# Patient Record
Sex: FEMALE | Race: WHITE | Employment: FULL TIME | URBAN - METROPOLITAN AREA
[De-identification: names, ages, dates, MRNs, and addresses within clinical notes are randomized per-mention and may not be internally consistent; named-entity substitution may affect disease eponyms.]

---

## 2017-01-06 ENCOUNTER — HOSPITAL ENCOUNTER (OUTPATIENT)
Dept: INFUSION THERAPY | Age: 37
Discharge: HOME | End: 2017-01-06
Payer: COMMERCIAL

## 2017-01-06 VITALS
BODY MASS INDEX: 26.19 KG/M2 | WEIGHT: 152.6 LBS | DIASTOLIC BLOOD PRESSURE: 76 MMHG | HEART RATE: 109 BPM | RESPIRATION RATE: 18 BRPM | OXYGEN SATURATION: 98 % | TEMPERATURE: 99.6 F | SYSTOLIC BLOOD PRESSURE: 137 MMHG

## 2017-01-06 DIAGNOSIS — C50.312 MALIGNANT NEOPLASM OF LOWER-INNER QUADRANT OF LEFT FEMALE BREAST (HCC): ICD-10-CM

## 2017-01-06 LAB
ALBUMIN SERPL BCP-MCNC: 3.8 G/DL (ref 3.5–5)
ALBUMIN/GLOB SERPL: 1.2 {RATIO} (ref 1.2–3.5)
ALP SERPL-CCNC: 89 U/L (ref 50–136)
ALT SERPL-CCNC: 77 U/L (ref 12–65)
ANION GAP BLD CALC-SCNC: 10 MMOL/L (ref 7–16)
AST SERPL W P-5'-P-CCNC: 28 U/L (ref 15–37)
BASOPHILS # BLD AUTO: 0 K/UL (ref 0–0.2)
BASOPHILS # BLD: 0 % (ref 0–2)
BILIRUB SERPL-MCNC: 0.9 MG/DL (ref 0.2–1.1)
BUN SERPL-MCNC: 10 MG/DL (ref 6–23)
CALCIUM SERPL-MCNC: 9.4 MG/DL (ref 8.3–10.4)
CHLORIDE SERPL-SCNC: 105 MMOL/L (ref 98–107)
CO2 SERPL-SCNC: 24 MMOL/L (ref 23–32)
CREAT SERPL-MCNC: 0.81 MG/DL (ref 0.6–1)
DIFFERENTIAL METHOD BLD: ABNORMAL
EOSINOPHIL # BLD: 0 K/UL (ref 0–0.8)
EOSINOPHIL NFR BLD: 0 % (ref 0.5–7.8)
ERYTHROCYTE [DISTWIDTH] IN BLOOD BY AUTOMATED COUNT: 17.7 % (ref 11.9–14.6)
GLOBULIN SER CALC-MCNC: 3.3 G/DL (ref 2.3–3.5)
GLUCOSE SERPL-MCNC: 145 MG/DL (ref 65–100)
HCT VFR BLD AUTO: 32.6 % (ref 35.8–46.3)
HGB BLD-MCNC: 10.9 G/DL (ref 11.7–15.4)
LYMPHOCYTES # BLD AUTO: 5 % (ref 13–44)
LYMPHOCYTES # BLD: 0.3 K/UL (ref 0.5–4.6)
MCH RBC QN AUTO: 31.4 PG (ref 26.1–32.9)
MCHC RBC AUTO-ENTMCNC: 33.4 G/DL (ref 31.4–35)
MCV RBC AUTO: 93.9 FL (ref 79.6–97.8)
MONOCYTES # BLD: 0.1 K/UL (ref 0.1–1.3)
MONOCYTES NFR BLD AUTO: 2 % (ref 4–12)
NEUTS SEG # BLD: 4.5 K/UL (ref 1.7–8.2)
NEUTS SEG NFR BLD AUTO: 92 % (ref 43–78)
NRBC # BLD: 0 K/UL (ref 0–0.2)
PLATELET # BLD AUTO: 171 K/UL (ref 150–450)
PMV BLD AUTO: 10.1 FL (ref 10.8–14.1)
POTASSIUM SERPL-SCNC: 3.6 MMOL/L (ref 3.5–5.1)
PROT SERPL-MCNC: 7.1 G/DL (ref 6.3–8.2)
RBC # BLD AUTO: 3.47 M/UL (ref 4.05–5.25)
SODIUM SERPL-SCNC: 139 MMOL/L (ref 136–145)
WBC # BLD AUTO: 4.8 K/UL (ref 4.3–11.1)

## 2017-01-06 PROCEDURE — 74011000250 HC RX REV CODE- 250: Performed by: NURSE PRACTITIONER

## 2017-01-06 PROCEDURE — 74011250636 HC RX REV CODE- 250/636: Performed by: NURSE PRACTITIONER

## 2017-01-06 PROCEDURE — 96413 CHEMO IV INFUSION 1 HR: CPT

## 2017-01-06 PROCEDURE — 74011250636 HC RX REV CODE- 250/636: Performed by: INTERNAL MEDICINE

## 2017-01-06 PROCEDURE — 77030003560 HC NDL HUBR BARD -A

## 2017-01-06 PROCEDURE — 85025 COMPLETE CBC W/AUTO DIFF WBC: CPT | Performed by: INTERNAL MEDICINE

## 2017-01-06 PROCEDURE — 96375 TX/PRO/DX INJ NEW DRUG ADDON: CPT

## 2017-01-06 PROCEDURE — 80053 COMPREHEN METABOLIC PANEL: CPT | Performed by: INTERNAL MEDICINE

## 2017-01-06 RX ORDER — HEPARIN 100 UNIT/ML
500 SYRINGE INTRAVENOUS AS NEEDED
Status: COMPLETED | OUTPATIENT
Start: 2017-01-06 | End: 2017-01-06

## 2017-01-06 RX ORDER — SODIUM CHLORIDE 0.9 % (FLUSH) 0.9 %
10-40 SYRINGE (ML) INJECTION AS NEEDED
Status: ACTIVE | OUTPATIENT
Start: 2017-01-06 | End: 2017-01-07

## 2017-01-06 RX ORDER — DEXAMETHASONE SODIUM PHOSPHATE 100 MG/10ML
8 INJECTION INTRAMUSCULAR; INTRAVENOUS ONCE
Status: COMPLETED | OUTPATIENT
Start: 2017-01-06 | End: 2017-01-06

## 2017-01-06 RX ORDER — FAMOTIDINE 10 MG/ML
20 INJECTION INTRAVENOUS ONCE
Status: COMPLETED | OUTPATIENT
Start: 2017-01-06 | End: 2017-01-06

## 2017-01-06 RX ORDER — DIPHENHYDRAMINE HYDROCHLORIDE 50 MG/ML
25 INJECTION, SOLUTION INTRAMUSCULAR; INTRAVENOUS ONCE
Status: COMPLETED | OUTPATIENT
Start: 2017-01-06 | End: 2017-01-06

## 2017-01-06 RX ORDER — HEPARIN 100 UNIT/ML
300-500 SYRINGE INTRAVENOUS AS NEEDED
Status: ACTIVE | OUTPATIENT
Start: 2017-01-06 | End: 2017-01-07

## 2017-01-06 RX ORDER — SODIUM CHLORIDE 0.9 % (FLUSH) 0.9 %
10 SYRINGE (ML) INJECTION AS NEEDED
Status: ACTIVE | OUTPATIENT
Start: 2017-01-06 | End: 2017-01-07

## 2017-01-06 RX ADMIN — SODIUM CHLORIDE, PRESERVATIVE FREE 500 UNITS: 5 INJECTION INTRAVENOUS at 16:45

## 2017-01-06 RX ADMIN — DEXAMETHASONE SODIUM PHOSPHATE 8 MG: 10 INJECTION INTRAMUSCULAR; INTRAVENOUS at 14:58

## 2017-01-06 RX ADMIN — PACLITAXEL 137 MG: 6 INJECTION, SOLUTION, CONCENTRATE INTRAVENOUS at 15:37

## 2017-01-06 RX ADMIN — SODIUM CHLORIDE 500 ML: 900 INJECTION, SOLUTION INTRAVENOUS at 14:35

## 2017-01-06 RX ADMIN — Medication 10 ML: at 14:35

## 2017-01-06 RX ADMIN — DIPHENHYDRAMINE HYDROCHLORIDE 12.5 MG: 50 INJECTION, SOLUTION INTRAMUSCULAR; INTRAVENOUS at 14:55

## 2017-01-06 RX ADMIN — FAMOTIDINE 20 MG: 10 INJECTION, SOLUTION INTRAVENOUS at 15:03

## 2017-01-06 RX ADMIN — Medication 10 ML: at 16:45

## 2017-01-06 NOTE — PROGRESS NOTES
Arrived to the [facility]. Upon arrival port accessed and blood drawn. Chemo completed. Patient tolerated well. Port flushed and de-accessed. Any issues or concerns during appointment: None. Patient aware of next infusion appointment on 1/13 (date) at 1400 (time). Discharged ambulatory in stable condition.

## 2017-01-06 NOTE — PROGRESS NOTES
Problem: Knowledge Deficit  Goal: *Participate in the learning process  Outcome: Progressing Towards Goal  Review plan of care  Review patient education  Monitor for reactions

## 2017-01-13 ENCOUNTER — HOSPITAL ENCOUNTER (OUTPATIENT)
Dept: LAB | Age: 37
Discharge: HOME | End: 2017-01-13
Payer: COMMERCIAL

## 2017-01-13 ENCOUNTER — HOSPITAL ENCOUNTER (OUTPATIENT)
Dept: INFUSION THERAPY | Age: 37
Discharge: HOME | End: 2017-01-13
Payer: COMMERCIAL

## 2017-01-13 DIAGNOSIS — C50.312 MALIGNANT NEOPLASM OF LOWER-INNER QUADRANT OF LEFT FEMALE BREAST (HCC): ICD-10-CM

## 2017-01-13 LAB
ALBUMIN SERPL BCP-MCNC: 3.3 G/DL (ref 3.5–5)
ALBUMIN/GLOB SERPL: 0.9 {RATIO} (ref 1.2–3.5)
ALP SERPL-CCNC: 78 U/L (ref 50–136)
ALT SERPL-CCNC: 35 U/L (ref 12–65)
ANION GAP BLD CALC-SCNC: 7 MMOL/L (ref 7–16)
AST SERPL W P-5'-P-CCNC: 12 U/L (ref 15–37)
BASOPHILS # BLD AUTO: 0 K/UL (ref 0–0.2)
BASOPHILS # BLD: 0 % (ref 0–2)
BILIRUB SERPL-MCNC: 0.9 MG/DL (ref 0.2–1.1)
BUN SERPL-MCNC: 6 MG/DL (ref 6–23)
CALCIUM SERPL-MCNC: 8.6 MG/DL (ref 8.3–10.4)
CHLORIDE SERPL-SCNC: 108 MMOL/L (ref 98–107)
CO2 SERPL-SCNC: 25 MMOL/L (ref 23–32)
CREAT SERPL-MCNC: 0.66 MG/DL (ref 0.6–1)
DIFFERENTIAL METHOD BLD: ABNORMAL
EOSINOPHIL # BLD: 0 K/UL (ref 0–0.8)
EOSINOPHIL NFR BLD: 0 % (ref 0.5–7.8)
ERYTHROCYTE [DISTWIDTH] IN BLOOD BY AUTOMATED COUNT: 17 % (ref 11.9–14.6)
GLOBULIN SER CALC-MCNC: 3.5 G/DL (ref 2.3–3.5)
GLUCOSE SERPL-MCNC: 159 MG/DL (ref 65–100)
HCG SERPL QL: NEGATIVE
HCT VFR BLD AUTO: 28.9 % (ref 35.8–46.3)
HGB BLD-MCNC: 9.7 G/DL (ref 11.7–15.4)
LYMPHOCYTES # BLD AUTO: 7 % (ref 13–44)
LYMPHOCYTES # BLD: 0.3 K/UL (ref 0.5–4.6)
MCH RBC QN AUTO: 31.6 PG (ref 26.1–32.9)
MCHC RBC AUTO-ENTMCNC: 33.6 G/DL (ref 31.4–35)
MCV RBC AUTO: 94.1 FL (ref 79.6–97.8)
MONOCYTES # BLD: 0.1 K/UL (ref 0.1–1.3)
MONOCYTES NFR BLD AUTO: 3 % (ref 4–12)
NEUTS SEG # BLD: 3.6 K/UL (ref 1.7–8.2)
NEUTS SEG NFR BLD AUTO: 90 % (ref 43–78)
NRBC # BLD: 0 K/UL (ref 0–0.2)
PLATELET # BLD AUTO: 137 K/UL (ref 150–450)
PLATELET COMMENTS,PCOM: SLIGHT
PMV BLD AUTO: 9.9 FL (ref 10.8–14.1)
POTASSIUM SERPL-SCNC: 3.7 MMOL/L (ref 3.5–5.1)
PROT SERPL-MCNC: 6.8 G/DL (ref 6.3–8.2)
RBC # BLD AUTO: 3.07 M/UL (ref 4.05–5.25)
RBC MORPH BLD: ABNORMAL
SODIUM SERPL-SCNC: 140 MMOL/L (ref 136–145)
WBC # BLD AUTO: 4 K/UL (ref 4.3–11.1)
WBC MORPH BLD: ABNORMAL

## 2017-01-13 PROCEDURE — 96413 CHEMO IV INFUSION 1 HR: CPT

## 2017-01-13 PROCEDURE — 74011000250 HC RX REV CODE- 250: Performed by: NURSE PRACTITIONER

## 2017-01-13 PROCEDURE — 85025 COMPLETE CBC W/AUTO DIFF WBC: CPT | Performed by: NURSE PRACTITIONER

## 2017-01-13 PROCEDURE — 80053 COMPREHEN METABOLIC PANEL: CPT | Performed by: NURSE PRACTITIONER

## 2017-01-13 PROCEDURE — 84703 CHORIONIC GONADOTROPIN ASSAY: CPT | Performed by: INTERNAL MEDICINE

## 2017-01-13 PROCEDURE — 96375 TX/PRO/DX INJ NEW DRUG ADDON: CPT

## 2017-01-13 PROCEDURE — 74011250636 HC RX REV CODE- 250/636: Performed by: NURSE PRACTITIONER

## 2017-01-13 RX ORDER — HEPARIN 100 UNIT/ML
300-500 SYRINGE INTRAVENOUS AS NEEDED
Status: DISPENSED | OUTPATIENT
Start: 2017-01-13 | End: 2017-01-14

## 2017-01-13 RX ORDER — DEXAMETHASONE SODIUM PHOSPHATE 4 MG/ML
8 INJECTION, SOLUTION INTRA-ARTICULAR; INTRALESIONAL; INTRAMUSCULAR; INTRAVENOUS; SOFT TISSUE ONCE
Status: COMPLETED | OUTPATIENT
Start: 2017-01-13 | End: 2017-01-13

## 2017-01-13 RX ORDER — DIPHENHYDRAMINE HYDROCHLORIDE 50 MG/ML
12.5 INJECTION, SOLUTION INTRAMUSCULAR; INTRAVENOUS ONCE
Status: COMPLETED | OUTPATIENT
Start: 2017-01-13 | End: 2017-01-13

## 2017-01-13 RX ORDER — SODIUM CHLORIDE 0.9 % (FLUSH) 0.9 %
10 SYRINGE (ML) INJECTION AS NEEDED
Status: ACTIVE | OUTPATIENT
Start: 2017-01-13 | End: 2017-01-14

## 2017-01-13 RX ORDER — FAMOTIDINE 10 MG/ML
20 INJECTION INTRAVENOUS ONCE
Status: COMPLETED | OUTPATIENT
Start: 2017-01-13 | End: 2017-01-13

## 2017-01-13 RX ADMIN — DEXAMETHASONE SODIUM PHOSPHATE 8 MG: 4 INJECTION, SOLUTION INTRAMUSCULAR; INTRAVENOUS at 14:54

## 2017-01-13 RX ADMIN — FAMOTIDINE 20 MG: 10 INJECTION, SOLUTION INTRAVENOUS at 15:01

## 2017-01-13 RX ADMIN — SODIUM CHLORIDE, PRESERVATIVE FREE 500 UNITS: 5 INJECTION INTRAVENOUS at 16:44

## 2017-01-13 RX ADMIN — SODIUM CHLORIDE 500 ML: 900 INJECTION, SOLUTION INTRAVENOUS at 14:27

## 2017-01-13 RX ADMIN — PACLITAXEL 137 MG: 6 INJECTION, SOLUTION, CONCENTRATE INTRAVENOUS at 15:38

## 2017-01-13 RX ADMIN — Medication 10 ML: at 16:44

## 2017-01-13 RX ADMIN — DIPHENHYDRAMINE HYDROCHLORIDE 12.5 MG: 50 INJECTION, SOLUTION INTRAMUSCULAR; INTRAVENOUS at 14:57

## 2017-01-13 RX ADMIN — Medication 10 ML: at 14:27

## 2017-01-13 NOTE — PROGRESS NOTES
Arrived to the [facility]. Taxol completed. Patient tolerated well. Any issues or concerns during appointment: Spoke with Dr. Katarina Mcdonough regarding if patient needs pregnancy test before treatment. Per MD, patient should have pregnancy test today. Test resulted negative. Patient aware of next infusion appointment on 1/20/17 at 1400. Discharged ambulatory from Infusion.

## 2017-01-19 RX ORDER — ACETAMINOPHEN 325 MG/1
650 TABLET ORAL AS NEEDED
Status: CANCELLED
Start: 2017-01-20

## 2017-01-19 RX ORDER — EPINEPHRINE 1 MG/ML
0.3 INJECTION, SOLUTION, CONCENTRATE INTRAVENOUS AS NEEDED
Status: CANCELLED | OUTPATIENT
Start: 2017-01-20

## 2017-01-19 RX ORDER — DIPHENHYDRAMINE HYDROCHLORIDE 50 MG/ML
50 INJECTION, SOLUTION INTRAMUSCULAR; INTRAVENOUS AS NEEDED
Status: CANCELLED
Start: 2017-01-20

## 2017-01-19 RX ORDER — ALBUTEROL SULFATE 0.83 MG/ML
2.5 SOLUTION RESPIRATORY (INHALATION) AS NEEDED
Status: CANCELLED
Start: 2017-01-20

## 2017-01-19 RX ORDER — HYDROCORTISONE SODIUM SUCCINATE 100 MG/2ML
100 INJECTION, POWDER, FOR SOLUTION INTRAMUSCULAR; INTRAVENOUS AS NEEDED
Status: CANCELLED | OUTPATIENT
Start: 2017-01-20

## 2017-01-19 RX ORDER — ONDANSETRON 2 MG/ML
8 INJECTION INTRAMUSCULAR; INTRAVENOUS AS NEEDED
Status: CANCELLED | OUTPATIENT
Start: 2017-01-20

## 2017-01-19 RX ORDER — HEPARIN SODIUM 1000 [USP'U]/ML
2000 INJECTION, SOLUTION INTRAVENOUS; SUBCUTANEOUS AS NEEDED
Status: CANCELLED
Start: 2017-01-20

## 2017-01-20 ENCOUNTER — HOSPITAL ENCOUNTER (OUTPATIENT)
Dept: INFUSION THERAPY | Age: 37
Discharge: HOME | End: 2017-01-20
Payer: COMMERCIAL

## 2017-01-20 VITALS
WEIGHT: 150.8 LBS | HEART RATE: 103 BPM | SYSTOLIC BLOOD PRESSURE: 114 MMHG | BODY MASS INDEX: 25.88 KG/M2 | OXYGEN SATURATION: 100 % | DIASTOLIC BLOOD PRESSURE: 70 MMHG | RESPIRATION RATE: 18 BRPM | TEMPERATURE: 98.6 F

## 2017-01-20 DIAGNOSIS — C50.312 MALIGNANT NEOPLASM OF LOWER-INNER QUADRANT OF LEFT FEMALE BREAST (HCC): ICD-10-CM

## 2017-01-20 LAB
ALBUMIN SERPL BCP-MCNC: 3.1 G/DL (ref 3.5–5)
ALBUMIN/GLOB SERPL: 0.8 {RATIO} (ref 1.2–3.5)
ALP SERPL-CCNC: 78 U/L (ref 50–136)
ALT SERPL-CCNC: 26 U/L (ref 12–65)
ANION GAP BLD CALC-SCNC: 6 MMOL/L (ref 7–16)
AST SERPL W P-5'-P-CCNC: 13 U/L (ref 15–37)
BASOPHILS # BLD AUTO: 0 K/UL (ref 0–0.2)
BASOPHILS # BLD: 0 % (ref 0–2)
BILIRUB SERPL-MCNC: 0.7 MG/DL (ref 0.2–1.1)
BUN SERPL-MCNC: 8 MG/DL (ref 6–23)
CALCIUM SERPL-MCNC: 9.2 MG/DL (ref 8.3–10.4)
CHLORIDE SERPL-SCNC: 103 MMOL/L (ref 98–107)
CO2 SERPL-SCNC: 28 MMOL/L (ref 23–32)
CREAT SERPL-MCNC: 0.62 MG/DL (ref 0.6–1)
DIFFERENTIAL METHOD BLD: ABNORMAL
EOSINOPHIL # BLD: 0 K/UL (ref 0–0.8)
EOSINOPHIL NFR BLD: 1 % (ref 0.5–7.8)
ERYTHROCYTE [DISTWIDTH] IN BLOOD BY AUTOMATED COUNT: 14.7 % (ref 11.9–14.6)
GLOBULIN SER CALC-MCNC: 3.9 G/DL (ref 2.3–3.5)
GLUCOSE SERPL-MCNC: 159 MG/DL (ref 65–100)
HCT VFR BLD AUTO: 28.4 % (ref 35.8–46.3)
HGB BLD-MCNC: 9.4 G/DL (ref 11.7–15.4)
LYMPHOCYTES # BLD AUTO: 8 % (ref 13–44)
LYMPHOCYTES # BLD: 0.3 K/UL (ref 0.5–4.6)
MCH RBC QN AUTO: 31.1 PG (ref 26.1–32.9)
MCHC RBC AUTO-ENTMCNC: 33.1 G/DL (ref 31.4–35)
MCV RBC AUTO: 94 FL (ref 79.6–97.8)
MONOCYTES # BLD: 0.1 K/UL (ref 0.1–1.3)
MONOCYTES NFR BLD AUTO: 2 % (ref 4–12)
NEUTS SEG # BLD: 3.4 K/UL (ref 1.7–8.2)
NEUTS SEG NFR BLD AUTO: 88 % (ref 43–78)
NRBC # BLD: 0 K/UL (ref 0–0.2)
PLATELET # BLD AUTO: 255 K/UL (ref 150–450)
PMV BLD AUTO: 9.5 FL (ref 10.8–14.1)
POTASSIUM SERPL-SCNC: 3.6 MMOL/L (ref 3.5–5.1)
PROT SERPL-MCNC: 7 G/DL (ref 6.3–8.2)
RBC # BLD AUTO: 3.02 M/UL (ref 4.05–5.25)
SODIUM SERPL-SCNC: 137 MMOL/L (ref 136–145)
WBC # BLD AUTO: 3.8 K/UL (ref 4.3–11.1)

## 2017-01-20 PROCEDURE — 85025 COMPLETE CBC W/AUTO DIFF WBC: CPT | Performed by: INTERNAL MEDICINE

## 2017-01-20 PROCEDURE — 96413 CHEMO IV INFUSION 1 HR: CPT

## 2017-01-20 PROCEDURE — 96375 TX/PRO/DX INJ NEW DRUG ADDON: CPT

## 2017-01-20 PROCEDURE — 74011000250 HC RX REV CODE- 250: Performed by: INTERNAL MEDICINE

## 2017-01-20 PROCEDURE — 74011250636 HC RX REV CODE- 250/636: Performed by: INTERNAL MEDICINE

## 2017-01-20 PROCEDURE — 77030003560 HC NDL HUBR BARD -A

## 2017-01-20 PROCEDURE — 80053 COMPREHEN METABOLIC PANEL: CPT | Performed by: INTERNAL MEDICINE

## 2017-01-20 RX ORDER — DEXAMETHASONE SODIUM PHOSPHATE 4 MG/ML
8 INJECTION, SOLUTION INTRA-ARTICULAR; INTRALESIONAL; INTRAMUSCULAR; INTRAVENOUS; SOFT TISSUE ONCE
Status: COMPLETED | OUTPATIENT
Start: 2017-01-20 | End: 2017-01-20

## 2017-01-20 RX ORDER — FAMOTIDINE 10 MG/ML
20 INJECTION INTRAVENOUS ONCE
Status: COMPLETED | OUTPATIENT
Start: 2017-01-20 | End: 2017-01-20

## 2017-01-20 RX ORDER — HEPARIN 100 UNIT/ML
300-500 SYRINGE INTRAVENOUS AS NEEDED
Status: DISPENSED | OUTPATIENT
Start: 2017-01-20 | End: 2017-01-21

## 2017-01-20 RX ORDER — SODIUM CHLORIDE 0.9 % (FLUSH) 0.9 %
10 SYRINGE (ML) INJECTION AS NEEDED
Status: ACTIVE | OUTPATIENT
Start: 2017-01-20 | End: 2017-01-21

## 2017-01-20 RX ORDER — DIPHENHYDRAMINE HYDROCHLORIDE 50 MG/ML
12.5 INJECTION, SOLUTION INTRAMUSCULAR; INTRAVENOUS ONCE
Status: COMPLETED | OUTPATIENT
Start: 2017-01-20 | End: 2017-01-20

## 2017-01-20 RX ADMIN — SODIUM CHLORIDE, PRESERVATIVE FREE 500 UNITS: 5 INJECTION INTRAVENOUS at 15:57

## 2017-01-20 RX ADMIN — PACLITAXEL 137 MG: 6 INJECTION, SOLUTION, CONCENTRATE INTRAVENOUS at 14:52

## 2017-01-20 RX ADMIN — SODIUM CHLORIDE 500 ML: 900 INJECTION, SOLUTION INTRAVENOUS at 14:36

## 2017-01-20 RX ADMIN — DIPHENHYDRAMINE HYDROCHLORIDE 12.5 MG: 50 INJECTION, SOLUTION INTRAMUSCULAR; INTRAVENOUS at 14:32

## 2017-01-20 RX ADMIN — FAMOTIDINE 20 MG: 10 INJECTION, SOLUTION INTRAVENOUS at 14:34

## 2017-01-20 RX ADMIN — Medication 10 ML: at 15:57

## 2017-01-20 RX ADMIN — DEXAMETHASONE SODIUM PHOSPHATE 8 MG: 4 INJECTION, SOLUTION INTRAMUSCULAR; INTRAVENOUS at 14:30

## 2017-01-20 NOTE — PROGRESS NOTES
Chemo completed per order, pt tolerated well, discharged home ambulatory, next appointment 1/27 at [phone]

## 2017-01-27 ENCOUNTER — HOSPITAL ENCOUNTER (OUTPATIENT)
Dept: LAB | Age: 37
Discharge: HOME | End: 2017-01-27
Payer: COMMERCIAL

## 2017-01-27 ENCOUNTER — PATIENT OUTREACH (OUTPATIENT)
Dept: CASE MANAGEMENT | Age: 37
End: 2017-01-27

## 2017-01-27 ENCOUNTER — HOSPITAL ENCOUNTER (OUTPATIENT)
Dept: INFUSION THERAPY | Age: 37
Discharge: HOME | End: 2017-01-27
Payer: COMMERCIAL

## 2017-01-27 VITALS
TEMPERATURE: 98.6 F | BODY MASS INDEX: 26.06 KG/M2 | DIASTOLIC BLOOD PRESSURE: 78 MMHG | OXYGEN SATURATION: 97 % | RESPIRATION RATE: 16 BRPM | HEART RATE: 84 BPM | WEIGHT: 151.8 LBS | SYSTOLIC BLOOD PRESSURE: 113 MMHG

## 2017-01-27 DIAGNOSIS — C50.312 MALIGNANT NEOPLASM OF LOWER-INNER QUADRANT OF LEFT FEMALE BREAST (HCC): ICD-10-CM

## 2017-01-27 LAB
ALBUMIN SERPL BCP-MCNC: 3.1 G/DL (ref 3.5–5)
ALBUMIN/GLOB SERPL: 0.8 {RATIO} (ref 1.2–3.5)
ALP SERPL-CCNC: 93 U/L (ref 50–136)
ALT SERPL-CCNC: 28 U/L (ref 12–65)
ANION GAP BLD CALC-SCNC: 6 MMOL/L (ref 7–16)
AST SERPL W P-5'-P-CCNC: 13 U/L (ref 15–37)
BASOPHILS # BLD AUTO: 0 K/UL (ref 0–0.2)
BASOPHILS # BLD: 0 % (ref 0–2)
BILIRUB SERPL-MCNC: 0.4 MG/DL (ref 0.2–1.1)
BUN SERPL-MCNC: 8 MG/DL (ref 6–23)
CALCIUM SERPL-MCNC: 8.9 MG/DL (ref 8.3–10.4)
CHLORIDE SERPL-SCNC: 105 MMOL/L (ref 98–107)
CO2 SERPL-SCNC: 26 MMOL/L (ref 23–32)
CREAT SERPL-MCNC: 0.76 MG/DL (ref 0.6–1)
DIFFERENTIAL METHOD BLD: ABNORMAL
EOSINOPHIL # BLD: 0 K/UL (ref 0–0.8)
EOSINOPHIL NFR BLD: 1 % (ref 0.5–7.8)
ERYTHROCYTE [DISTWIDTH] IN BLOOD BY AUTOMATED COUNT: 14.6 % (ref 11.9–14.6)
GLOBULIN SER CALC-MCNC: 3.8 G/DL (ref 2.3–3.5)
GLUCOSE SERPL-MCNC: 136 MG/DL (ref 65–100)
HCT VFR BLD AUTO: 30.6 % (ref 35.8–46.3)
HGB BLD-MCNC: 10.1 G/DL (ref 11.7–15.4)
LYMPHOCYTES # BLD AUTO: 8 % (ref 13–44)
LYMPHOCYTES # BLD: 0.4 K/UL (ref 0.5–4.6)
MCH RBC QN AUTO: 31.8 PG (ref 26.1–32.9)
MCHC RBC AUTO-ENTMCNC: 33 G/DL (ref 31.4–35)
MCV RBC AUTO: 96.2 FL (ref 79.6–97.8)
MONOCYTES # BLD: 0.2 K/UL (ref 0.1–1.3)
MONOCYTES NFR BLD AUTO: 3 % (ref 4–12)
NEUTS SEG # BLD: 5.2 K/UL (ref 1.7–8.2)
NEUTS SEG NFR BLD AUTO: 89 % (ref 43–78)
NRBC # BLD: 0 K/UL (ref 0–0.2)
PLATELET # BLD AUTO: 283 K/UL (ref 150–450)
PMV BLD AUTO: 9.5 FL (ref 10.8–14.1)
POTASSIUM SERPL-SCNC: 3.8 MMOL/L (ref 3.5–5.1)
PROT SERPL-MCNC: 6.9 G/DL (ref 6.3–8.2)
RBC # BLD AUTO: 3.18 M/UL (ref 4.05–5.25)
SODIUM SERPL-SCNC: 137 MMOL/L (ref 136–145)
WBC # BLD AUTO: 5.8 K/UL (ref 4.3–11.1)

## 2017-01-27 PROCEDURE — 85025 COMPLETE CBC W/AUTO DIFF WBC: CPT | Performed by: NURSE PRACTITIONER

## 2017-01-27 PROCEDURE — 96413 CHEMO IV INFUSION 1 HR: CPT

## 2017-01-27 PROCEDURE — 96375 TX/PRO/DX INJ NEW DRUG ADDON: CPT

## 2017-01-27 PROCEDURE — 80053 COMPREHEN METABOLIC PANEL: CPT | Performed by: NURSE PRACTITIONER

## 2017-01-27 PROCEDURE — 74011250636 HC RX REV CODE- 250/636: Performed by: NURSE PRACTITIONER

## 2017-01-27 PROCEDURE — 96361 HYDRATE IV INFUSION ADD-ON: CPT

## 2017-01-27 PROCEDURE — 74011000250 HC RX REV CODE- 250: Performed by: NURSE PRACTITIONER

## 2017-01-27 RX ORDER — SODIUM CHLORIDE 0.9 % (FLUSH) 0.9 %
10 SYRINGE (ML) INJECTION AS NEEDED
Status: ACTIVE | OUTPATIENT
Start: 2017-01-27 | End: 2017-01-27

## 2017-01-27 RX ORDER — HEPARIN 100 UNIT/ML
300-500 SYRINGE INTRAVENOUS AS NEEDED
Status: DISPENSED | OUTPATIENT
Start: 2017-01-27 | End: 2017-01-27

## 2017-01-27 RX ORDER — DIPHENHYDRAMINE HYDROCHLORIDE 50 MG/ML
12.5 INJECTION, SOLUTION INTRAMUSCULAR; INTRAVENOUS ONCE
Status: COMPLETED | OUTPATIENT
Start: 2017-01-27 | End: 2017-01-27

## 2017-01-27 RX ORDER — DEXAMETHASONE SODIUM PHOSPHATE 100 MG/10ML
8 INJECTION INTRAMUSCULAR; INTRAVENOUS ONCE
Status: COMPLETED | OUTPATIENT
Start: 2017-01-27 | End: 2017-01-27

## 2017-01-27 RX ORDER — FAMOTIDINE 10 MG/ML
20 INJECTION INTRAVENOUS ONCE
Status: COMPLETED | OUTPATIENT
Start: 2017-01-27 | End: 2017-01-27

## 2017-01-27 RX ADMIN — FAMOTIDINE 20 MG: 10 INJECTION, SOLUTION INTRAVENOUS at 11:16

## 2017-01-27 RX ADMIN — PACLITAXEL 137 MG: 6 INJECTION, SOLUTION, CONCENTRATE INTRAVENOUS at 11:45

## 2017-01-27 RX ADMIN — SODIUM CHLORIDE, PRESERVATIVE FREE 500 UNITS: 5 INJECTION INTRAVENOUS at 13:00

## 2017-01-27 RX ADMIN — Medication 10 ML: at 11:18

## 2017-01-27 RX ADMIN — Medication 10 ML: at 10:45

## 2017-01-27 RX ADMIN — DEXAMETHASONE SODIUM PHOSPHATE 8 MG: 10 INJECTION INTRAMUSCULAR; INTRAVENOUS at 11:18

## 2017-01-27 RX ADMIN — DIPHENHYDRAMINE HYDROCHLORIDE 12.5 MG: 50 INJECTION, SOLUTION INTRAMUSCULAR; INTRAVENOUS at 11:20

## 2017-01-27 RX ADMIN — Medication 10 ML: at 11:16

## 2017-01-27 RX ADMIN — Medication 10 ML: at 13:00

## 2017-01-27 RX ADMIN — SODIUM CHLORIDE 500 ML: 900 INJECTION, SOLUTION INTRAVENOUS at 10:45

## 2017-01-27 NOTE — PROGRESS NOTES
Problem: Chemotherapy Treatment  Goal: *Chemotherapy regimen followed  Outcome: Progressing Towards Goal  Verbalizes/demonstrates understanding of purpose/procedure/potential side effects of taxol.

## 2017-01-27 NOTE — PROGRESS NOTES
Pt arrived ambulatory today at 1040, to receive IV chemotherapy. Pt tolerated without difficulty. Patient discharged via ambulatory accompanied by mother. Instructed to notify physician of any problems, questions or concerns. Allowed opportunity for patient/family to ask questions. Verbalized understanding. Next appointment is Feb 3 at 1400 with Moses Galo.

## 2017-01-27 NOTE — ACP (ADVANCE CARE PLANNING)
1/27/17 saw pt today with Aflredo Sterling NP for pre chemo cycle 7 taxol, 1 dose missed. She is tolerating chemo well. Neuropathy is intermittent and mild. PO intake is good. Continue current treatment. Follow up in 2 weeks. Encouraged to call with any concerns. Navigation will continue to follow.

## 2017-02-03 ENCOUNTER — HOSPITAL ENCOUNTER (OUTPATIENT)
Dept: INFUSION THERAPY | Age: 37
Discharge: HOME | End: 2017-02-03
Payer: COMMERCIAL

## 2017-02-03 VITALS
SYSTOLIC BLOOD PRESSURE: 116 MMHG | HEART RATE: 92 BPM | RESPIRATION RATE: 18 BRPM | TEMPERATURE: 98.4 F | BODY MASS INDEX: 26.4 KG/M2 | DIASTOLIC BLOOD PRESSURE: 60 MMHG | OXYGEN SATURATION: 97 % | WEIGHT: 153.8 LBS

## 2017-02-03 DIAGNOSIS — C50.312 MALIGNANT NEOPLASM OF LOWER-INNER QUADRANT OF LEFT FEMALE BREAST (HCC): ICD-10-CM

## 2017-02-03 LAB
ALBUMIN SERPL BCP-MCNC: 3.2 G/DL (ref 3.5–5)
ALBUMIN/GLOB SERPL: 0.8 {RATIO} (ref 1.2–3.5)
ALP SERPL-CCNC: 97 U/L (ref 50–136)
ALT SERPL-CCNC: 33 U/L (ref 12–65)
ANION GAP BLD CALC-SCNC: 11 MMOL/L (ref 7–16)
AST SERPL W P-5'-P-CCNC: 17 U/L (ref 15–37)
BASOPHILS # BLD AUTO: 0 K/UL (ref 0–0.2)
BASOPHILS # BLD: 0 % (ref 0–2)
BILIRUB SERPL-MCNC: 0.5 MG/DL (ref 0.2–1.1)
BUN SERPL-MCNC: 10 MG/DL (ref 6–23)
CALCIUM SERPL-MCNC: 8.9 MG/DL (ref 8.3–10.4)
CHLORIDE SERPL-SCNC: 103 MMOL/L (ref 98–107)
CO2 SERPL-SCNC: 25 MMOL/L (ref 23–32)
CREAT SERPL-MCNC: 0.71 MG/DL (ref 0.6–1)
DIFFERENTIAL METHOD BLD: ABNORMAL
EOSINOPHIL # BLD: 0 K/UL (ref 0–0.8)
EOSINOPHIL NFR BLD: 1 % (ref 0.5–7.8)
ERYTHROCYTE [DISTWIDTH] IN BLOOD BY AUTOMATED COUNT: 14.6 % (ref 11.9–14.6)
GLOBULIN SER CALC-MCNC: 4 G/DL (ref 2.3–3.5)
GLUCOSE SERPL-MCNC: 158 MG/DL (ref 65–100)
HCT VFR BLD AUTO: 30.4 % (ref 35.8–46.3)
HGB BLD-MCNC: 10 G/DL (ref 11.7–15.4)
LYMPHOCYTES # BLD AUTO: 7 % (ref 13–44)
LYMPHOCYTES # BLD: 0.4 K/UL (ref 0.5–4.6)
MCH RBC QN AUTO: 31.6 PG (ref 26.1–32.9)
MCHC RBC AUTO-ENTMCNC: 32.9 G/DL (ref 31.4–35)
MCV RBC AUTO: 96.2 FL (ref 79.6–97.8)
MONOCYTES # BLD: 0.1 K/UL (ref 0.1–1.3)
MONOCYTES NFR BLD AUTO: 3 % (ref 4–12)
NEUTS SEG # BLD: 4.8 K/UL (ref 1.7–8.2)
NEUTS SEG NFR BLD AUTO: 90 % (ref 43–78)
NRBC # BLD: 0.01 K/UL (ref 0–0.2)
PLATELET # BLD AUTO: 250 K/UL (ref 150–450)
PMV BLD AUTO: 9.2 FL (ref 10.8–14.1)
POTASSIUM SERPL-SCNC: 4 MMOL/L (ref 3.5–5.1)
PROT SERPL-MCNC: 7.2 G/DL (ref 6.3–8.2)
RBC # BLD AUTO: 3.16 M/UL (ref 4.05–5.25)
SODIUM SERPL-SCNC: 139 MMOL/L (ref 136–145)
WBC # BLD AUTO: 5.3 K/UL (ref 4.3–11.1)

## 2017-02-03 PROCEDURE — 96375 TX/PRO/DX INJ NEW DRUG ADDON: CPT

## 2017-02-03 PROCEDURE — 77030003560 HC NDL HUBR BARD -A

## 2017-02-03 PROCEDURE — 80053 COMPREHEN METABOLIC PANEL: CPT | Performed by: INTERNAL MEDICINE

## 2017-02-03 PROCEDURE — 74011250636 HC RX REV CODE- 250/636: Performed by: NURSE PRACTITIONER

## 2017-02-03 PROCEDURE — 85025 COMPLETE CBC W/AUTO DIFF WBC: CPT | Performed by: INTERNAL MEDICINE

## 2017-02-03 PROCEDURE — 74011000250 HC RX REV CODE- 250: Performed by: NURSE PRACTITIONER

## 2017-02-03 PROCEDURE — 96413 CHEMO IV INFUSION 1 HR: CPT

## 2017-02-03 PROCEDURE — 96361 HYDRATE IV INFUSION ADD-ON: CPT

## 2017-02-03 RX ORDER — HEPARIN 100 UNIT/ML
300-500 SYRINGE INTRAVENOUS AS NEEDED
Status: DISPENSED | OUTPATIENT
Start: 2017-02-03 | End: 2017-02-04

## 2017-02-03 RX ORDER — DEXAMETHASONE SODIUM PHOSPHATE 4 MG/ML
8 INJECTION, SOLUTION INTRA-ARTICULAR; INTRALESIONAL; INTRAMUSCULAR; INTRAVENOUS; SOFT TISSUE ONCE
Status: COMPLETED | OUTPATIENT
Start: 2017-02-03 | End: 2017-02-03

## 2017-02-03 RX ORDER — DIPHENHYDRAMINE HYDROCHLORIDE 50 MG/ML
25 INJECTION, SOLUTION INTRAMUSCULAR; INTRAVENOUS ONCE
Status: COMPLETED | OUTPATIENT
Start: 2017-02-03 | End: 2017-02-03

## 2017-02-03 RX ORDER — FAMOTIDINE 10 MG/ML
20 INJECTION INTRAVENOUS ONCE
Status: COMPLETED | OUTPATIENT
Start: 2017-02-03 | End: 2017-02-03

## 2017-02-03 RX ADMIN — DIPHENHYDRAMINE HYDROCHLORIDE 25 MG: 50 INJECTION, SOLUTION INTRAMUSCULAR; INTRAVENOUS at 14:21

## 2017-02-03 RX ADMIN — SODIUM CHLORIDE 500 ML: 900 INJECTION, SOLUTION INTRAVENOUS at 13:45

## 2017-02-03 RX ADMIN — FAMOTIDINE 20 MG: 10 INJECTION, SOLUTION INTRAVENOUS at 14:17

## 2017-02-03 RX ADMIN — SODIUM CHLORIDE, PRESERVATIVE FREE 500 UNITS: 5 INJECTION INTRAVENOUS at 15:58

## 2017-02-03 RX ADMIN — PACLITAXEL 137 MG: 6 INJECTION, SOLUTION, CONCENTRATE INTRAVENOUS at 14:53

## 2017-02-03 RX ADMIN — DEXAMETHASONE SODIUM PHOSPHATE 8 MG: 4 INJECTION, SOLUTION INTRAMUSCULAR; INTRAVENOUS at 14:19

## 2017-02-03 NOTE — PROGRESS NOTES
Arrived to the [facility]. chemo completed. Patient tolerated well. Any issues or concerns during appointment: no.  Patient aware of next infusion appointment on  2/10 at 1500. Discharged to home ambulatory.

## 2017-02-03 NOTE — PROGRESS NOTES
Arrived to the [facility]. Port already accessed - positive blood return. Hydration, premeds and chemo completed. Patient tolerated without difficulty. Any issues or concerns during appointment: none. Patient aware of next infusion appointment on 2/10 (date) at 3pm (time). Discharged ambulatory to home.

## 2017-02-10 ENCOUNTER — HOSPITAL ENCOUNTER (OUTPATIENT)
Dept: INFUSION THERAPY | Age: 37
Discharge: HOME | End: 2017-02-10
Payer: COMMERCIAL

## 2017-02-10 ENCOUNTER — HOSPITAL ENCOUNTER (OUTPATIENT)
Dept: LAB | Age: 37
Discharge: HOME | End: 2017-02-10
Payer: COMMERCIAL

## 2017-02-10 ENCOUNTER — PATIENT OUTREACH (OUTPATIENT)
Dept: CASE MANAGEMENT | Age: 37
End: 2017-02-10

## 2017-02-10 DIAGNOSIS — C50.312 MALIGNANT NEOPLASM OF LOWER-INNER QUADRANT OF LEFT FEMALE BREAST (HCC): ICD-10-CM

## 2017-02-10 LAB
ALBUMIN SERPL BCP-MCNC: 3.4 G/DL (ref 3.5–5)
ALBUMIN/GLOB SERPL: 1.1 {RATIO} (ref 1.2–3.5)
ALP SERPL-CCNC: 80 U/L (ref 50–136)
ALT SERPL-CCNC: 34 U/L (ref 12–65)
ANION GAP BLD CALC-SCNC: 7 MMOL/L (ref 7–16)
AST SERPL W P-5'-P-CCNC: 20 U/L (ref 15–37)
BASOPHILS # BLD AUTO: 0 K/UL (ref 0–0.2)
BASOPHILS # BLD: 1 % (ref 0–2)
BILIRUB SERPL-MCNC: 0.4 MG/DL (ref 0.2–1.1)
BUN SERPL-MCNC: 7 MG/DL (ref 6–23)
CALCIUM SERPL-MCNC: 8.9 MG/DL (ref 8.3–10.4)
CHLORIDE SERPL-SCNC: 106 MMOL/L (ref 98–107)
CO2 SERPL-SCNC: 27 MMOL/L (ref 23–32)
CREAT SERPL-MCNC: 0.67 MG/DL (ref 0.6–1)
DIFFERENTIAL METHOD BLD: ABNORMAL
EOSINOPHIL # BLD: 0.1 K/UL (ref 0–0.8)
EOSINOPHIL NFR BLD: 2 % (ref 0.5–7.8)
ERYTHROCYTE [DISTWIDTH] IN BLOOD BY AUTOMATED COUNT: 14.6 % (ref 11.9–14.6)
GLOBULIN SER CALC-MCNC: 3.2 G/DL (ref 2.3–3.5)
GLUCOSE SERPL-MCNC: 134 MG/DL (ref 65–100)
HCT VFR BLD AUTO: 30 % (ref 35.8–46.3)
HGB BLD-MCNC: 9.8 G/DL (ref 11.7–15.4)
LYMPHOCYTES # BLD AUTO: 28 % (ref 13–44)
LYMPHOCYTES # BLD: 1.2 K/UL (ref 0.5–4.6)
MCH RBC QN AUTO: 31.3 PG (ref 26.1–32.9)
MCHC RBC AUTO-ENTMCNC: 32.7 G/DL (ref 31.4–35)
MCV RBC AUTO: 95.8 FL (ref 79.6–97.8)
MONOCYTES # BLD: 0.3 K/UL (ref 0.1–1.3)
MONOCYTES NFR BLD AUTO: 6 % (ref 4–12)
NEUTS SEG # BLD: 2.7 K/UL (ref 1.7–8.2)
NEUTS SEG NFR BLD AUTO: 64 % (ref 43–78)
NRBC # BLD: 0 K/UL (ref 0–0.2)
PLATELET # BLD AUTO: 278 K/UL (ref 150–450)
PMV BLD AUTO: 9.2 FL (ref 10.8–14.1)
POTASSIUM SERPL-SCNC: 3.7 MMOL/L (ref 3.5–5.1)
PROT SERPL-MCNC: 6.6 G/DL (ref 6.3–8.2)
RBC # BLD AUTO: 3.13 M/UL (ref 4.05–5.25)
SODIUM SERPL-SCNC: 140 MMOL/L (ref 136–145)
WBC # BLD AUTO: 4.2 K/UL (ref 4.3–11.1)

## 2017-02-10 PROCEDURE — 96413 CHEMO IV INFUSION 1 HR: CPT

## 2017-02-10 PROCEDURE — 85025 COMPLETE CBC W/AUTO DIFF WBC: CPT | Performed by: INTERNAL MEDICINE

## 2017-02-10 PROCEDURE — 80053 COMPREHEN METABOLIC PANEL: CPT | Performed by: INTERNAL MEDICINE

## 2017-02-10 PROCEDURE — 74011250636 HC RX REV CODE- 250/636: Performed by: NURSE PRACTITIONER

## 2017-02-10 PROCEDURE — 74011000250 HC RX REV CODE- 250: Performed by: NURSE PRACTITIONER

## 2017-02-10 PROCEDURE — 96375 TX/PRO/DX INJ NEW DRUG ADDON: CPT

## 2017-02-10 RX ORDER — DEXAMETHASONE SODIUM PHOSPHATE 100 MG/10ML
8 INJECTION INTRAMUSCULAR; INTRAVENOUS ONCE
Status: COMPLETED | OUTPATIENT
Start: 2017-02-10 | End: 2017-02-10

## 2017-02-10 RX ORDER — HYDROCORTISONE SODIUM SUCCINATE 100 MG/2ML
100 INJECTION, POWDER, FOR SOLUTION INTRAMUSCULAR; INTRAVENOUS AS NEEDED
Status: ACTIVE | OUTPATIENT
Start: 2017-02-10 | End: 2017-02-11

## 2017-02-10 RX ORDER — DIPHENHYDRAMINE HYDROCHLORIDE 50 MG/ML
25 INJECTION, SOLUTION INTRAMUSCULAR; INTRAVENOUS ONCE
Status: COMPLETED | OUTPATIENT
Start: 2017-02-10 | End: 2017-02-10

## 2017-02-10 RX ORDER — SODIUM CHLORIDE 0.9 % (FLUSH) 0.9 %
10 SYRINGE (ML) INJECTION AS NEEDED
Status: ACTIVE | OUTPATIENT
Start: 2017-02-10 | End: 2017-02-11

## 2017-02-10 RX ORDER — HEPARIN 100 UNIT/ML
300-500 SYRINGE INTRAVENOUS AS NEEDED
Status: DISPENSED | OUTPATIENT
Start: 2017-02-10 | End: 2017-02-11

## 2017-02-10 RX ORDER — FAMOTIDINE 10 MG/ML
20 INJECTION INTRAVENOUS ONCE
Status: COMPLETED | OUTPATIENT
Start: 2017-02-10 | End: 2017-02-10

## 2017-02-10 RX ADMIN — PACLITAXEL 137 MG: 6 INJECTION, SOLUTION, CONCENTRATE INTRAVENOUS at 16:30

## 2017-02-10 RX ADMIN — SODIUM CHLORIDE 500 ML: 900 INJECTION, SOLUTION INTRAVENOUS at 15:31

## 2017-02-10 RX ADMIN — DEXAMETHASONE SODIUM PHOSPHATE 8 MG: 10 INJECTION INTRAMUSCULAR; INTRAVENOUS at 15:38

## 2017-02-10 RX ADMIN — SODIUM CHLORIDE, PRESERVATIVE FREE 500 UNITS: 5 INJECTION INTRAVENOUS at 17:36

## 2017-02-10 RX ADMIN — FAMOTIDINE 20 MG: 10 INJECTION, SOLUTION INTRAVENOUS at 15:41

## 2017-02-10 RX ADMIN — DIPHENHYDRAMINE HYDROCHLORIDE 25 MG: 50 INJECTION, SOLUTION INTRAMUSCULAR; INTRAVENOUS at 15:50

## 2017-02-10 RX ADMIN — Medication 10 ML: at 17:36

## 2017-02-10 RX ADMIN — Medication 10 ML: at 15:31

## 2017-02-10 NOTE — PROGRESS NOTES
Arrived to the [facility]. Taxol completed. Patient tolerated well. Any issues or concerns during appointment: none. Patient aware of next infusion appointment on 02/17  at 1330 . Discharged ambulatory.

## 2017-02-10 NOTE — ACP (ADVANCE CARE PLANNING)
2/10/2017 Saw the patient with Layton Meneses NP. She is doing well and due for her 9th Taxol today. She denies neuropathy. Will continue to follow.

## 2017-02-17 ENCOUNTER — HOSPITAL ENCOUNTER (OUTPATIENT)
Dept: INFUSION THERAPY | Age: 37
Discharge: HOME | End: 2017-02-17
Payer: COMMERCIAL

## 2017-02-17 ENCOUNTER — HOSPITAL ENCOUNTER (OUTPATIENT)
Dept: LAB | Age: 37
Discharge: HOME | End: 2017-02-17
Payer: COMMERCIAL

## 2017-02-17 ENCOUNTER — PATIENT OUTREACH (OUTPATIENT)
Dept: CASE MANAGEMENT | Age: 37
End: 2017-02-17

## 2017-02-17 DIAGNOSIS — C50.312 MALIGNANT NEOPLASM OF LOWER-INNER QUADRANT OF LEFT FEMALE BREAST (HCC): ICD-10-CM

## 2017-02-17 LAB
ALBUMIN SERPL BCP-MCNC: 3.4 G/DL (ref 3.5–5)
ALBUMIN/GLOB SERPL: 1 {RATIO} (ref 1.2–3.5)
ALP SERPL-CCNC: 79 U/L (ref 50–136)
ALT SERPL-CCNC: 41 U/L (ref 12–65)
ANION GAP BLD CALC-SCNC: 7 MMOL/L (ref 7–16)
AST SERPL W P-5'-P-CCNC: 19 U/L (ref 15–37)
BASOPHILS # BLD AUTO: 0 K/UL (ref 0–0.2)
BASOPHILS # BLD: 0 % (ref 0–2)
BILIRUB SERPL-MCNC: 0.5 MG/DL (ref 0.2–1.1)
BUN SERPL-MCNC: 12 MG/DL (ref 6–23)
CALCIUM SERPL-MCNC: 8.9 MG/DL (ref 8.3–10.4)
CHLORIDE SERPL-SCNC: 106 MMOL/L (ref 98–107)
CO2 SERPL-SCNC: 27 MMOL/L (ref 23–32)
CREAT SERPL-MCNC: 0.71 MG/DL (ref 0.6–1)
DIFFERENTIAL METHOD BLD: ABNORMAL
EOSINOPHIL # BLD: 0.1 K/UL (ref 0–0.8)
EOSINOPHIL NFR BLD: 2 % (ref 0.5–7.8)
ERYTHROCYTE [DISTWIDTH] IN BLOOD BY AUTOMATED COUNT: 15.3 % (ref 11.9–14.6)
GLOBULIN SER CALC-MCNC: 3.3 G/DL (ref 2.3–3.5)
GLUCOSE SERPL-MCNC: 102 MG/DL (ref 65–100)
HCT VFR BLD AUTO: 31.9 % (ref 35.8–46.3)
HGB BLD-MCNC: 10.4 G/DL (ref 11.7–15.4)
LYMPHOCYTES # BLD AUTO: 22 % (ref 13–44)
LYMPHOCYTES # BLD: 1 K/UL (ref 0.5–4.6)
MCH RBC QN AUTO: 31.5 PG (ref 26.1–32.9)
MCHC RBC AUTO-ENTMCNC: 32.6 G/DL (ref 31.4–35)
MCV RBC AUTO: 96.7 FL (ref 79.6–97.8)
MONOCYTES # BLD: 0.4 K/UL (ref 0.1–1.3)
MONOCYTES NFR BLD AUTO: 8 % (ref 4–12)
NEUTS SEG # BLD: 3 K/UL (ref 1.7–8.2)
NEUTS SEG NFR BLD AUTO: 68 % (ref 43–78)
NRBC # BLD: 0.01 K/UL (ref 0–0.2)
PLATELET # BLD AUTO: 222 K/UL (ref 150–450)
PMV BLD AUTO: 9.4 FL (ref 10.8–14.1)
POTASSIUM SERPL-SCNC: 3.8 MMOL/L (ref 3.5–5.1)
PROT SERPL-MCNC: 6.7 G/DL (ref 6.3–8.2)
RBC # BLD AUTO: 3.3 M/UL (ref 4.05–5.25)
SODIUM SERPL-SCNC: 140 MMOL/L (ref 136–145)
WBC # BLD AUTO: 4.5 K/UL (ref 4.3–11.1)

## 2017-02-17 PROCEDURE — 74011000250 HC RX REV CODE- 250: Performed by: NURSE PRACTITIONER

## 2017-02-17 PROCEDURE — 74011250636 HC RX REV CODE- 250/636: Performed by: NURSE PRACTITIONER

## 2017-02-17 PROCEDURE — 96413 CHEMO IV INFUSION 1 HR: CPT

## 2017-02-17 PROCEDURE — 85025 COMPLETE CBC W/AUTO DIFF WBC: CPT | Performed by: NURSE PRACTITIONER

## 2017-02-17 PROCEDURE — 80053 COMPREHEN METABOLIC PANEL: CPT | Performed by: NURSE PRACTITIONER

## 2017-02-17 PROCEDURE — 96375 TX/PRO/DX INJ NEW DRUG ADDON: CPT

## 2017-02-17 RX ORDER — HEPARIN 100 UNIT/ML
300-500 SYRINGE INTRAVENOUS AS NEEDED
Status: DISPENSED | OUTPATIENT
Start: 2017-02-17 | End: 2017-02-18

## 2017-02-17 RX ORDER — FAMOTIDINE 10 MG/ML
20 INJECTION INTRAVENOUS ONCE
Status: COMPLETED | OUTPATIENT
Start: 2017-02-17 | End: 2017-02-17

## 2017-02-17 RX ORDER — DIPHENHYDRAMINE HYDROCHLORIDE 50 MG/ML
12.5 INJECTION, SOLUTION INTRAMUSCULAR; INTRAVENOUS ONCE
Status: COMPLETED | OUTPATIENT
Start: 2017-02-17 | End: 2017-02-17

## 2017-02-17 RX ORDER — HYDROCORTISONE SODIUM SUCCINATE 100 MG/2ML
100 INJECTION, POWDER, FOR SOLUTION INTRAMUSCULAR; INTRAVENOUS AS NEEDED
Status: ACTIVE | OUTPATIENT
Start: 2017-02-17 | End: 2017-02-18

## 2017-02-17 RX ORDER — DEXAMETHASONE SODIUM PHOSPHATE 100 MG/10ML
8 INJECTION INTRAMUSCULAR; INTRAVENOUS ONCE
Status: COMPLETED | OUTPATIENT
Start: 2017-02-17 | End: 2017-02-17

## 2017-02-17 RX ORDER — SODIUM CHLORIDE 0.9 % (FLUSH) 0.9 %
10 SYRINGE (ML) INJECTION AS NEEDED
Status: ACTIVE | OUTPATIENT
Start: 2017-02-17 | End: 2017-02-18

## 2017-02-17 RX ADMIN — Medication 10 ML: at 16:38

## 2017-02-17 RX ADMIN — DEXAMETHASONE SODIUM PHOSPHATE 8 MG: 10 INJECTION INTRAMUSCULAR; INTRAVENOUS at 14:43

## 2017-02-17 RX ADMIN — PACLITAXEL 137 MG: 6 INJECTION, SOLUTION, CONCENTRATE INTRAVENOUS at 15:20

## 2017-02-17 RX ADMIN — Medication 10 ML: at 14:38

## 2017-02-17 RX ADMIN — FAMOTIDINE 20 MG: 10 INJECTION, SOLUTION INTRAVENOUS at 14:46

## 2017-02-17 RX ADMIN — SODIUM CHLORIDE, PRESERVATIVE FREE 500 UNITS: 5 INJECTION INTRAVENOUS at 16:38

## 2017-02-17 RX ADMIN — SODIUM CHLORIDE 500 ML: 900 INJECTION, SOLUTION INTRAVENOUS at 14:53

## 2017-02-17 RX ADMIN — DIPHENHYDRAMINE HYDROCHLORIDE 12.5 MG: 50 INJECTION, SOLUTION INTRAMUSCULAR; INTRAVENOUS at 14:50

## 2017-02-17 NOTE — ACP (ADVANCE CARE PLANNING)
2/17/17 saw pt today with Carmencita Espinoza NP for pre chemo cycle 10 taxol. She is tolerating chemo well. Denies any neuropathy. PO intake good. Follow up in 2 weeks for last chemo. Encouraged to call with any concerns. Navigation will continue to follow.

## 2017-02-17 NOTE — PROGRESS NOTES
Pt. Discharged ambulatory accompanied by family. Tolerated chemotherapy well. No distress noted. To call physician with any problems or concerns. Understanding voiced. To return to infusions on 2/24/17.

## 2017-02-24 ENCOUNTER — HOSPITAL ENCOUNTER (OUTPATIENT)
Dept: INFUSION THERAPY | Age: 37
Discharge: HOME | End: 2017-02-24
Payer: COMMERCIAL

## 2017-02-24 VITALS
BODY MASS INDEX: 27.05 KG/M2 | SYSTOLIC BLOOD PRESSURE: 110 MMHG | HEART RATE: 88 BPM | DIASTOLIC BLOOD PRESSURE: 70 MMHG | WEIGHT: 157.6 LBS | TEMPERATURE: 98.1 F | RESPIRATION RATE: 18 BRPM | OXYGEN SATURATION: 96 %

## 2017-02-24 DIAGNOSIS — C50.312 MALIGNANT NEOPLASM OF LOWER-INNER QUADRANT OF LEFT FEMALE BREAST (HCC): ICD-10-CM

## 2017-02-24 LAB
ALBUMIN SERPL BCP-MCNC: 3.3 G/DL (ref 3.5–5)
ALBUMIN/GLOB SERPL: 1.1 {RATIO} (ref 1.2–3.5)
ALP SERPL-CCNC: 86 U/L (ref 50–136)
ALT SERPL-CCNC: 32 U/L (ref 12–65)
ANION GAP BLD CALC-SCNC: 8 MMOL/L (ref 7–16)
AST SERPL W P-5'-P-CCNC: 17 U/L (ref 15–37)
BASOPHILS # BLD AUTO: 0 K/UL (ref 0–0.2)
BASOPHILS # BLD: 0 % (ref 0–2)
BILIRUB SERPL-MCNC: 0.8 MG/DL (ref 0.2–1.1)
BUN SERPL-MCNC: 8 MG/DL (ref 6–23)
CALCIUM SERPL-MCNC: 8.7 MG/DL (ref 8.3–10.4)
CHLORIDE SERPL-SCNC: 106 MMOL/L (ref 98–107)
CO2 SERPL-SCNC: 27 MMOL/L (ref 23–32)
CREAT SERPL-MCNC: 0.71 MG/DL (ref 0.6–1)
DIFFERENTIAL METHOD BLD: ABNORMAL
EOSINOPHIL # BLD: 0.1 K/UL (ref 0–0.8)
EOSINOPHIL NFR BLD: 1 % (ref 0.5–7.8)
ERYTHROCYTE [DISTWIDTH] IN BLOOD BY AUTOMATED COUNT: 15.2 % (ref 11.9–14.6)
GLOBULIN SER CALC-MCNC: 3.1 G/DL (ref 2.3–3.5)
GLUCOSE SERPL-MCNC: 139 MG/DL (ref 65–100)
HCT VFR BLD AUTO: 32.5 % (ref 35.8–46.3)
HGB BLD-MCNC: 10.7 G/DL (ref 11.7–15.4)
LYMPHOCYTES # BLD AUTO: 12 % (ref 13–44)
LYMPHOCYTES # BLD: 0.7 K/UL (ref 0.5–4.6)
MCH RBC QN AUTO: 31.8 PG (ref 26.1–32.9)
MCHC RBC AUTO-ENTMCNC: 32.9 G/DL (ref 31.4–35)
MCV RBC AUTO: 96.4 FL (ref 79.6–97.8)
MONOCYTES # BLD: 0.3 K/UL (ref 0.1–1.3)
MONOCYTES NFR BLD AUTO: 5 % (ref 4–12)
NEUTS SEG # BLD: 4.5 K/UL (ref 1.7–8.2)
NEUTS SEG NFR BLD AUTO: 82 % (ref 43–78)
NRBC # BLD: 0 K/UL (ref 0–0.2)
PLATELET # BLD AUTO: 230 K/UL (ref 150–450)
PMV BLD AUTO: 9.6 FL (ref 10.8–14.1)
POTASSIUM SERPL-SCNC: 3.7 MMOL/L (ref 3.5–5.1)
PROT SERPL-MCNC: 6.4 G/DL (ref 6.3–8.2)
RBC # BLD AUTO: 3.37 M/UL (ref 4.05–5.25)
SODIUM SERPL-SCNC: 141 MMOL/L (ref 136–145)
WBC # BLD AUTO: 5.5 K/UL (ref 4.3–11.1)

## 2017-02-24 PROCEDURE — 74011250636 HC RX REV CODE- 250/636: Performed by: NURSE PRACTITIONER

## 2017-02-24 PROCEDURE — 77030003560 HC NDL HUBR BARD -A

## 2017-02-24 PROCEDURE — 74011000250 HC RX REV CODE- 250: Performed by: NURSE PRACTITIONER

## 2017-02-24 PROCEDURE — 96361 HYDRATE IV INFUSION ADD-ON: CPT

## 2017-02-24 PROCEDURE — 96375 TX/PRO/DX INJ NEW DRUG ADDON: CPT

## 2017-02-24 PROCEDURE — 85025 COMPLETE CBC W/AUTO DIFF WBC: CPT | Performed by: INTERNAL MEDICINE

## 2017-02-24 PROCEDURE — 96413 CHEMO IV INFUSION 1 HR: CPT

## 2017-02-24 PROCEDURE — 80053 COMPREHEN METABOLIC PANEL: CPT | Performed by: INTERNAL MEDICINE

## 2017-02-24 RX ORDER — DEXAMETHASONE SODIUM PHOSPHATE 100 MG/10ML
8 INJECTION INTRAMUSCULAR; INTRAVENOUS ONCE
Status: COMPLETED | OUTPATIENT
Start: 2017-02-24 | End: 2017-02-24

## 2017-02-24 RX ORDER — FAMOTIDINE 10 MG/ML
20 INJECTION INTRAVENOUS ONCE
Status: COMPLETED | OUTPATIENT
Start: 2017-02-24 | End: 2017-02-24

## 2017-02-24 RX ORDER — SODIUM CHLORIDE 0.9 % (FLUSH) 0.9 %
10 SYRINGE (ML) INJECTION AS NEEDED
Status: ACTIVE | OUTPATIENT
Start: 2017-02-24 | End: 2017-02-25

## 2017-02-24 RX ORDER — HEPARIN 100 UNIT/ML
300-500 SYRINGE INTRAVENOUS AS NEEDED
Status: DISPENSED | OUTPATIENT
Start: 2017-02-24 | End: 2017-02-25

## 2017-02-24 RX ORDER — DIPHENHYDRAMINE HYDROCHLORIDE 50 MG/ML
12.5 INJECTION, SOLUTION INTRAMUSCULAR; INTRAVENOUS ONCE
Status: COMPLETED | OUTPATIENT
Start: 2017-02-24 | End: 2017-02-24

## 2017-02-24 RX ADMIN — DIPHENHYDRAMINE HYDROCHLORIDE 12.5 MG: 50 INJECTION, SOLUTION INTRAMUSCULAR; INTRAVENOUS at 15:48

## 2017-02-24 RX ADMIN — SODIUM CHLORIDE, PRESERVATIVE FREE 300 UNITS: 5 INJECTION INTRAVENOUS at 17:35

## 2017-02-24 RX ADMIN — DEXAMETHASONE SODIUM PHOSPHATE 8 MG: 10 INJECTION INTRAMUSCULAR; INTRAVENOUS at 15:44

## 2017-02-24 RX ADMIN — SODIUM CHLORIDE 500 ML: 900 INJECTION, SOLUTION INTRAVENOUS at 15:50

## 2017-02-24 RX ADMIN — Medication 10 ML: at 15:35

## 2017-02-24 RX ADMIN — Medication 10 ML: at 17:33

## 2017-02-24 RX ADMIN — FAMOTIDINE 20 MG: 10 INJECTION, SOLUTION INTRAVENOUS at 15:43

## 2017-02-24 RX ADMIN — PACLITAXEL 137 MG: 6 INJECTION, SOLUTION, CONCENTRATE INTRAVENOUS at 16:26

## 2017-02-24 NOTE — PROGRESS NOTES
Arrived ambulatory for infusion  Taxol administered with no difficulty  No concerns voiced  Next appt 3/3  @ 3pm

## 2017-03-03 ENCOUNTER — HOSPITAL ENCOUNTER (OUTPATIENT)
Dept: INFUSION THERAPY | Age: 37
Discharge: HOME | End: 2017-03-03
Payer: COMMERCIAL

## 2017-03-03 ENCOUNTER — HOSPITAL ENCOUNTER (OUTPATIENT)
Dept: LAB | Age: 37
Discharge: HOME | End: 2017-03-03
Payer: COMMERCIAL

## 2017-03-03 ENCOUNTER — PATIENT OUTREACH (OUTPATIENT)
Dept: CASE MANAGEMENT | Age: 37
End: 2017-03-03

## 2017-03-03 DIAGNOSIS — C50.312 MALIGNANT NEOPLASM OF LOWER-INNER QUADRANT OF LEFT FEMALE BREAST (HCC): ICD-10-CM

## 2017-03-03 LAB
ALBUMIN SERPL BCP-MCNC: 3.5 G/DL (ref 3.5–5)
ALBUMIN/GLOB SERPL: 1 {RATIO} (ref 1.2–3.5)
ALP SERPL-CCNC: 89 U/L (ref 50–136)
ALT SERPL-CCNC: 24 U/L (ref 12–65)
ANION GAP BLD CALC-SCNC: 8 MMOL/L (ref 7–16)
APTT PPP: 26.2 SEC (ref 23.5–31.7)
AST SERPL W P-5'-P-CCNC: 12 U/L (ref 15–37)
BASOPHILS # BLD AUTO: 0 K/UL (ref 0–0.2)
BASOPHILS # BLD: 0 % (ref 0–2)
BILIRUB SERPL-MCNC: 0.6 MG/DL (ref 0.2–1.1)
BUN SERPL-MCNC: 13 MG/DL (ref 6–23)
CALCIUM SERPL-MCNC: 9 MG/DL (ref 8.3–10.4)
CHLORIDE SERPL-SCNC: 107 MMOL/L (ref 98–107)
CO2 SERPL-SCNC: 25 MMOL/L (ref 23–32)
CREAT SERPL-MCNC: 0.55 MG/DL (ref 0.6–1)
DIFFERENTIAL METHOD BLD: ABNORMAL
EOSINOPHIL # BLD: 0 K/UL (ref 0–0.8)
EOSINOPHIL NFR BLD: 1 % (ref 0.5–7.8)
ERYTHROCYTE [DISTWIDTH] IN BLOOD BY AUTOMATED COUNT: 14.8 % (ref 11.9–14.6)
GLOBULIN SER CALC-MCNC: 3.4 G/DL (ref 2.3–3.5)
GLUCOSE SERPL-MCNC: 96 MG/DL (ref 65–100)
HCT VFR BLD AUTO: 33.4 % (ref 35.8–46.3)
HGB BLD-MCNC: 11 G/DL (ref 11.7–15.4)
INR PPP: 1.2 (ref 0.9–1.2)
LYMPHOCYTES # BLD AUTO: 10 % (ref 13–44)
LYMPHOCYTES # BLD: 0.6 K/UL (ref 0.5–4.6)
MCH RBC QN AUTO: 30.9 PG (ref 26.1–32.9)
MCHC RBC AUTO-ENTMCNC: 32.9 G/DL (ref 31.4–35)
MCV RBC AUTO: 93.8 FL (ref 79.6–97.8)
MONOCYTES # BLD: 0.2 K/UL (ref 0.1–1.3)
MONOCYTES NFR BLD AUTO: 4 % (ref 4–12)
NEUTS SEG # BLD: 5 K/UL (ref 1.7–8.2)
NEUTS SEG NFR BLD AUTO: 85 % (ref 43–78)
NRBC # BLD: 0 K/UL (ref 0–0.2)
PLATELET # BLD AUTO: 226 K/UL (ref 150–450)
PMV BLD AUTO: 9.6 FL (ref 10.8–14.1)
POTASSIUM SERPL-SCNC: 4 MMOL/L (ref 3.5–5.1)
PROT SERPL-MCNC: 6.9 G/DL (ref 6.3–8.2)
PROTHROMBIN TIME: 12 SEC (ref 9.6–12)
RBC # BLD AUTO: 3.56 M/UL (ref 4.05–5.25)
SODIUM SERPL-SCNC: 140 MMOL/L (ref 136–145)
WBC # BLD AUTO: 5.9 K/UL (ref 4.3–11.1)

## 2017-03-03 PROCEDURE — 74011000250 HC RX REV CODE- 250: Performed by: INTERNAL MEDICINE

## 2017-03-03 PROCEDURE — 85610 PROTHROMBIN TIME: CPT | Performed by: INTERNAL MEDICINE

## 2017-03-03 PROCEDURE — 80053 COMPREHEN METABOLIC PANEL: CPT | Performed by: INTERNAL MEDICINE

## 2017-03-03 PROCEDURE — 96413 CHEMO IV INFUSION 1 HR: CPT

## 2017-03-03 PROCEDURE — 85730 THROMBOPLASTIN TIME PARTIAL: CPT | Performed by: INTERNAL MEDICINE

## 2017-03-03 PROCEDURE — 85025 COMPLETE CBC W/AUTO DIFF WBC: CPT | Performed by: INTERNAL MEDICINE

## 2017-03-03 PROCEDURE — 96375 TX/PRO/DX INJ NEW DRUG ADDON: CPT

## 2017-03-03 PROCEDURE — 74011250636 HC RX REV CODE- 250/636: Performed by: INTERNAL MEDICINE

## 2017-03-03 RX ORDER — FAMOTIDINE 10 MG/ML
20 INJECTION INTRAVENOUS ONCE
Status: COMPLETED | OUTPATIENT
Start: 2017-03-03 | End: 2017-03-03

## 2017-03-03 RX ORDER — DIPHENHYDRAMINE HYDROCHLORIDE 50 MG/ML
25 INJECTION, SOLUTION INTRAMUSCULAR; INTRAVENOUS ONCE
Status: COMPLETED | OUTPATIENT
Start: 2017-03-03 | End: 2017-03-03

## 2017-03-03 RX ORDER — SODIUM CHLORIDE 0.9 % (FLUSH) 0.9 %
10 SYRINGE (ML) INJECTION AS NEEDED
Status: ACTIVE | OUTPATIENT
Start: 2017-03-03 | End: 2017-03-04

## 2017-03-03 RX ORDER — HEPARIN 100 UNIT/ML
300-500 SYRINGE INTRAVENOUS AS NEEDED
Status: DISPENSED | OUTPATIENT
Start: 2017-03-03 | End: 2017-03-04

## 2017-03-03 RX ORDER — DEXAMETHASONE SODIUM PHOSPHATE 100 MG/10ML
8 INJECTION INTRAMUSCULAR; INTRAVENOUS ONCE
Status: COMPLETED | OUTPATIENT
Start: 2017-03-03 | End: 2017-03-03

## 2017-03-03 RX ADMIN — Medication 10 ML: at 16:52

## 2017-03-03 RX ADMIN — DEXAMETHASONE SODIUM PHOSPHATE 8 MG: 10 INJECTION INTRAMUSCULAR; INTRAVENOUS at 15:30

## 2017-03-03 RX ADMIN — PACLITAXEL 137 MG: 6 INJECTION, SOLUTION, CONCENTRATE INTRAVENOUS at 15:45

## 2017-03-03 RX ADMIN — SODIUM CHLORIDE 500 ML: 900 INJECTION, SOLUTION INTRAVENOUS at 15:40

## 2017-03-03 RX ADMIN — FAMOTIDINE 20 MG: 10 INJECTION, SOLUTION INTRAVENOUS at 15:27

## 2017-03-03 RX ADMIN — DIPHENHYDRAMINE HYDROCHLORIDE 12.5 MG: 50 INJECTION, SOLUTION INTRAMUSCULAR; INTRAVENOUS at 15:25

## 2017-03-03 RX ADMIN — Medication 500 UNITS: at 16:52

## 2017-03-03 RX ADMIN — Medication 10 ML: at 15:20

## 2017-03-03 NOTE — ACP (ADVANCE CARE PLANNING)
3/3/17 saw pt today with Dr. Swathi Villalobos for last taxol. She has tolerated chemo well. PO intake is good. She is scheduled for reconstruction surgery on 4/5. Discussed tamoxifen, including potential side effects. Instructed to start after surgery. Follow up in 3 months. Navigation will continue to follow.

## 2017-03-03 NOTE — PROGRESS NOTES
Arrived to the [facility]. Taxol completed. Patient tolerated well. Any issues or concerns during appointment: Pt scheduled for port removal 3/9. Labs drawn (PTT/PT/INR). Patient aware of next Radiology appointment on 3/9/17 at 0930. Pt has completed chemotherapy. Discharged ambulatory.

## 2017-03-08 ENCOUNTER — PATIENT OUTREACH (OUTPATIENT)
Dept: CASE MANAGEMENT | Age: 37
End: 2017-03-08

## 2017-03-08 ENCOUNTER — HOSPITAL ENCOUNTER (OUTPATIENT)
Dept: ULTRASOUND IMAGING | Age: 37
Discharge: HOME | End: 2017-03-08
Attending: INTERNAL MEDICINE
Payer: COMMERCIAL

## 2017-03-08 DIAGNOSIS — M79.89 SWELLING OF LOWER EXTREMITY: ICD-10-CM

## 2017-03-08 PROCEDURE — 93971 EXTREMITY STUDY: CPT

## 2017-03-09 ENCOUNTER — HOSPITAL ENCOUNTER (OUTPATIENT)
Dept: INTERVENTIONAL RADIOLOGY/VASCULAR | Age: 37
Discharge: HOME | End: 2017-03-09
Attending: INTERNAL MEDICINE
Payer: COMMERCIAL

## 2017-03-09 VITALS
DIASTOLIC BLOOD PRESSURE: 72 MMHG | RESPIRATION RATE: 14 BRPM | TEMPERATURE: 97.8 F | HEART RATE: 92 BPM | SYSTOLIC BLOOD PRESSURE: 117 MMHG | OXYGEN SATURATION: 98 %

## 2017-03-09 DIAGNOSIS — C50.312 MALIGNANT NEOPLASM OF LOWER-INNER QUADRANT OF LEFT FEMALE BREAST (HCC): ICD-10-CM

## 2017-03-09 PROCEDURE — 77001 FLUOROGUIDE FOR VEIN DEVICE: CPT

## 2017-03-09 PROCEDURE — 36590 REMOVAL TUNNELED CV CATH: CPT

## 2017-03-09 PROCEDURE — 77030031139 HC SUT VCRL2 J&J -A

## 2017-03-09 PROCEDURE — 99152 MOD SED SAME PHYS/QHP 5/>YRS: CPT

## 2017-03-09 PROCEDURE — 77030010507 HC ADH SKN DERMBND J&J -B

## 2017-03-09 PROCEDURE — 74011000250 HC RX REV CODE- 250: Performed by: RADIOLOGY

## 2017-03-09 PROCEDURE — 77030003028 HC SUT VCRL J&J -A

## 2017-03-09 PROCEDURE — 74011250636 HC RX REV CODE- 250/636

## 2017-03-09 PROCEDURE — 99153 MOD SED SAME PHYS/QHP EA: CPT

## 2017-03-09 RX ORDER — MIDAZOLAM HYDROCHLORIDE 1 MG/ML
.5-2 INJECTION, SOLUTION INTRAMUSCULAR; INTRAVENOUS
Status: DISCONTINUED | OUTPATIENT
Start: 2017-03-09 | End: 2017-03-13 | Stop reason: HOSPADM

## 2017-03-09 RX ORDER — LIDOCAINE HYDROCHLORIDE AND EPINEPHRINE 20; 10 MG/ML; UG/ML
2-20 INJECTION, SOLUTION INFILTRATION; PERINEURAL ONCE
Status: COMPLETED | OUTPATIENT
Start: 2017-03-09 | End: 2017-03-09

## 2017-03-09 RX ORDER — FENTANYL CITRATE 50 UG/ML
25-50 INJECTION, SOLUTION INTRAMUSCULAR; INTRAVENOUS
Status: DISCONTINUED | OUTPATIENT
Start: 2017-03-09 | End: 2017-03-13 | Stop reason: HOSPADM

## 2017-03-09 RX ADMIN — LIDOCAINE HYDROCHLORIDE,EPINEPHRINE BITARTRATE 200 MG: 20; .01 INJECTION, SOLUTION INFILTRATION; PERINEURAL at 10:12

## 2017-03-09 RX ADMIN — FENTANYL CITRATE 25 MCG: 50 INJECTION, SOLUTION INTRAMUSCULAR; INTRAVENOUS at 10:09

## 2017-03-09 RX ADMIN — MIDAZOLAM HYDROCHLORIDE 1 MG: 1 INJECTION, SOLUTION INTRAMUSCULAR; INTRAVENOUS at 10:09

## 2017-03-09 NOTE — IP AVS SNAPSHOT
[address] [id number] [address] 
[phone] Patient: Dariusz Robin MRN: [id number] VYF:[birth year] You are allergic to the following Allergen Reactions Lortab (Hydrocodone-Acetaminophen) Itching Zofran (Ondansetron Hcl (Pf)) Unknown (comments) \"severe migraines\" Recent Documentation Breastfeeding? OB Status Smoking Status No IUD Never Smoker Emergency Contacts Name Discharge Info Relation Home Work Mobile Ben Russell DISCHARGE CAREGIVER [3] Spouse [3] [phone] About your hospitalization You were admitted on:  March 9, 2017 You last received care in the:  [facility] Radiology Specials You were discharged on:  March 9, 2017 Unit phone number:  [phone] Why you were hospitalized Your primary diagnosis was:  Not on File Providers Seen During Your Hospitalizations Provider Role Specialty Primary office phone Lupe Everett MD Attending Provider Hematology and Oncology [phone] Your Primary Care Physician (PCP) Primary Care Physician Office Phone Office Fax 4 [facility], [address] [phone] Follow-up Information None Your Appointments Wednesday March 29, 2017 12:00 PM EDT PHONE ASSESSMENT with S[facility] PHONE APPOINTMENT  
Francisco [address] ([address] [place] [address]) 534 [facility]  
[phone] Wednesday April 05, 2017 BREAST TISSUE EXPANDER INSERTION/EXCHANGE with Nagi Sandoval MD  
[address] [facility] ([address] [place] [address]) [address] [place] [address] Rd  
[phone] Current Discharge Medication List  
  
ASK your doctor about these medications Dose & Instructions Dispensing Information Comments Morning Noon Evening Bedtime BIAXIN 250 mg tablet Generic drug:  clarithromycin Your next dose is: Today, Tomorrow Other:  _________ Dose:  250 mg Take 250 mg by mouth two (2) times a day. Refills:  0  
     
   
   
   
  
 CIPRODEX 0.3-0.1 % otic suspension Generic drug:  ciprofloxacin-dexamethasone Your next dose is: Today, Tomorrow Other:  _________ Dose:  4 Drop Administer 4 Drops in left ear two (2) times a day. Refills:  0  
     
   
   
   
  
 cyclobenzaprine 5 mg tablet Commonly known as:  FLEXERIL Your next dose is: Today, Tomorrow Other:  _________ Dose:  5 mg Take 5 mg by mouth three (3) times daily (with meals). Refills:  0  
     
   
   
   
  
 dexamethasone 4 mg tablet Commonly known as:  DECADRON Your next dose is: Today, Tomorrow Other:  _________ Take two (2) tabs 1-3 hours before chemo Quantity:  22 Tab Refills:  0  
     
   
   
   
  
 HAIR,SKIN AND NAILS PO Your next dose is: Today, Tomorrow Other:  _________ Take  by mouth. Refills:  0  
     
   
   
   
  
 ibuprofen 200 mg tablet Commonly known as:  MOTRIN Your next dose is: Today, Tomorrow Other:  _________ Dose:  400 mg Take 400 mg by mouth every six (6) hours as needed for Pain. Stopped 8/23/16 Refills:  0  
     
   
   
   
  
 levoFLOXacin 500 mg tablet Commonly known as:  Tony Claude Your next dose is: Today, Tomorrow Other:  _________ Dose:  500 mg Take 1 Tab by mouth daily. Do not take until instructed. Quantity:  7 Tab Refills:  0  
     
   
   
   
  
 lidocaine-prilocaine topical cream  
Commonly known as:  EMLA Your next dose is: Today, Tomorrow Other:  _________ Apply  to affected area as needed for Pain. Apply to port site 45-60 minutes prior to lab appt or infusion. Quantity:  30 g Refills:  0 LORazepam 0.5 mg tablet Commonly known as:  ATIVAN Your next dose is: Today, Tomorrow Other:  _________ Dose:  0.5-1 mg Take 1-2 Tabs by mouth every eight (8) hours as needed for Anxiety. Max Daily Amount: 3 mg. Quantity:  60 Tab Refills:  2 MIRENA IU Your next dose is: Today, Tomorrow Other:  _________  
   
   
 by IntraUTERine route. Indications: placed 2013 Refills:  0  
     
   
   
   
  
 prochlorperazine 10 mg tablet Commonly known as:  COMPAZINE Your next dose is: Today, Tomorrow Other:  _________ Dose:  10 mg Take 1 Tab by mouth every six (6) hours as needed. Quantity:  90 Tab Refills:  2  
     
   
   
   
  
 tamoxifen 20 mg tablet Commonly known as:  NOLVADEX Your next dose is: Today, Tomorrow Other:  _________ Dose:  20 mg Take 1 Tab by mouth daily. Quantity:  30 Tab Refills:  5  
     
   
   
   
  
 temazepam 15 mg capsule Commonly known as:  RESTORIL Your next dose is: Today, Tomorrow Other:  _________ Dose:  15 mg Take 1 Cap by mouth nightly as needed for Sleep. Max Daily Amount: 15 mg. Quantity:  30 Cap Refills:  0 Discharge Instructions [address] [place] [address] Department of Interventional Radiology [facility] 
[phone] Office 
[phone] Fax DRAIN/PORT/CATHETER REMOVAL DISCHARGE INSTRUCTIONS General Information: 
   Your doctor has ordered for us to remove your drain, port, or catheter. This could be that you do not need it anymore, it is not doing its job, your physician has decided on another plan for your treatment and/or it may need replacing. Home Care Instructions: You can resume your regular diet and medication regimen.  Do not drink alcohol, drive, or make any important legal decisions in the next 24 hours. Do not lift anything heavier than a gallon of milk, or do anything strenuous for the next 24 hours. You will notice a dressing over the site of the removal. This dressing should stay in place until the site is healed. The dressing should be changed at least every 48 hours. You should change the dressing sooner if it becomes soiled or wet. The nurse who discharges you to home should review with you any wound care instructions. Resume your normal level of activity slowly. You may shower after 24 hours, but do not take a bath, swim or immerse yourself in water until the site has healed, and keep the dressing dry with plastic wrap while showering. The site may ooze for a couple of days. This drainage should lessen with each passing day. Call If: 
   You should call your Physician and/or the Radiology Nurse if you have any bleeding other than a small spot on your bandage. Call if you have any signs of infection, fever, or increased pain at the site of the tube. Call if the oozing increases, if it changes color, or begins to have an odor. Follow-Up Instructions: Please see your ordering doctor as he/she has requested. To Reach Us: If you have any questions about your procedure, please call the Interventional Radiology department at [phone]. After business hours (5pm) and weekends, call the answering service at [phone] and ask for the Radiologist on call to be paged. Patient Signature: 
Date: 3/9/2017 Discharging Nurse: Chalino Luther RN Interventional Radiology General Nurse Discharge After general anesthesia or intravenous sedation, for 24 hours or while taking prescription Narcotics: · Limit your activities · Do not drive and operate hazardous machinery · Do not make important personal or business decisions · Do  not drink alcoholic beverages · If you have not urinated within 8 hours after discharge, please contact your surgeon on call. * Please give a list of your current medications to your Primary Care Provider. * Please update this list whenever your medications are discontinued, doses are 
   changed, or new medications (including over-the-counter products) are added. * Please carry medication information at all times in case of emergency situations. These are general instructions for a healthy lifestyle: No smoking/ No tobacco products/ Avoid exposure to second hand smoke Surgeon General's Warning:  Quitting smoking now greatly reduces serious risk to your health. Obesity, smoking, and sedentary lifestyle greatly increases your risk for illness A healthy diet, regular physical exercise & weight monitoring are important for maintaining a healthy lifestyle You may be retaining fluid if you have a history of heart failure or if you experience any of the following symptoms:  Weight gain of 3 pounds or more overnight or 5 pounds in a week, increased swelling in our hands or feet or shortness of breath while lying flat in bed. Please call your doctor as soon as you notice any of these symptoms; do not wait until your next office visit. Recognize signs and symptoms of STROKE: 
F-face looks uneven A-arms unable to move or move unevenly S-speech slurred or non-existent T-time-call 911 as soon as signs and symptoms begin-DO NOT go Back to bed or wait to see if you get better-TIME IS BRAIN. Discharge Orders None Introducing [facility]! Dear Mateo Matias: 
Thank you for requesting a Celly account. Our records indicate that you already have an active Celly account. You can access your account anytime at [email or web address] Did you know that you can access your hospital and ER discharge instructions at any time in Celly? You can also review all of your test results from your hospital stay or ER visit. Additional Information If you have questions, please visit the Frequently Asked Questions section of the MyChart website at [email or web address] Remember, MyChart is NOT to be used for urgent needs. For medical emergencies, dial 911. Now available from your iPhone and Android! General Information Please provide this summary of care documentation to your next provider. Patient Signature:  ____________________________________________________________ Date:  ____________________________________________________________  
  
[facility] [place] Provider Signature:  ____________________________________________________________ Date:  ____________________________________________________________

## 2017-03-09 NOTE — DISCHARGE INSTRUCTIONS
[address]  Department of Interventional Radiology  [address] [place] [address] [address] [facility]  [phone] Office  [phone] Fax    DRAIN/PORT/CATHETER REMOVAL  DISCHARGE INSTRUCTIONS    General Information:     Your doctor has ordered for us to remove your drain, port, or catheter. This could be that you do not need it anymore, it is not doing its job, your physician has decided on another plan for your treatment and/or it may need replacing. Home Care Instructions: You can resume your regular diet and medication regimen. Do not drink alcohol, drive, or make any important legal decisions in the next 24 hours. Do not lift anything heavier than a gallon of milk, or do anything strenuous for the next 24 hours. You will notice a dressing over the site of the removal. This dressing should stay in place until the site is healed. The dressing should be changed at least every 48 hours. You should change the dressing sooner if it becomes soiled or wet. The nurse who discharges you to home should review with you any wound care instructions. Resume your normal level of activity slowly. You may shower after 24 hours, but do not take a bath, swim or immerse yourself in water until the site has healed, and keep the dressing dry with plastic wrap while showering. The site may ooze for a couple of days. This drainage should lessen with each passing day. Call If:     You should call your Physician and/or the Radiology Nurse if you have any bleeding other than a small spot on your bandage. Call if you have any signs of infection, fever, or increased pain at the site of the tube. Call if the oozing increases, if it changes color, or begins to have an odor. Follow-Up Instructions: Please see your ordering doctor as he/she has requested. To Reach Us: If you have any questions about your procedure, please call the Interventional Radiology department at [phone].  After business hours (5pm) and weekends, call the answering service at [phone] and ask for the Radiologist on call to be paged. Patient Signature:  Date: 3/9/2017  Discharging Nurse: Karan Flanagan RN    Interventional Radiology General Nurse Discharge    After general anesthesia or intravenous sedation, for 24 hours or while taking prescription Narcotics:  · Limit your activities  · Do not drive and operate hazardous machinery  · Do not make important personal or business decisions  · Do  not drink alcoholic beverages  · If you have not urinated within 8 hours after discharge, please contact your surgeon on call. * Please give a list of your current medications to your Primary Care Provider. * Please update this list whenever your medications are discontinued, doses are     changed, or new medications (including over-the-counter products) are added. * Please carry medication information at all times in case of emergency situations. These are general instructions for a healthy lifestyle:    No smoking/ No tobacco products/ Avoid exposure to second hand smoke  Surgeon General's Warning:  Quitting smoking now greatly reduces serious risk to your health. Obesity, smoking, and sedentary lifestyle greatly increases your risk for illness  A healthy diet, regular physical exercise & weight monitoring are important for maintaining a healthy lifestyle    You may be retaining fluid if you have a history of heart failure or if you experience any of the following symptoms:  Weight gain of 3 pounds or more overnight or 5 pounds in a week, increased swelling in our hands or feet or shortness of breath while lying flat in bed. Please call your doctor as soon as you notice any of these symptoms; do not wait until your next office visit.     Recognize signs and symptoms of STROKE:  F-face looks uneven    A-arms unable to move or move unevenly    S-speech slurred or non-existent    T-time-call 911 as soon as signs and symptoms begin-DO NOT go       Back to bed or wait to see if you get better-TIME IS BRAIN.

## 2017-03-09 NOTE — PROGRESS NOTES
Interventional Radiology Prep Area Handoff      Allergies   Allergen Reactions    Lortab [Hydrocodone-Acetaminophen] Itching    Zofran [Ondansetron Hcl (Pf)] Unknown (comments)     \"severe migraines\"       IRSummary reviewed. Pt identified with two identifiers. Verified procedure with Patient and IR Provider as port removal with Dr Shirlene Cordoba. Consent obtained: y H&P complete/updated: susanna RAMIREZ airway complete: y    Sedation:y   NPO: y   Anticoagulation: n     Pt shaved: n/a   Antibiotics: n  Anesthesia notified: n/a    Additional Notes: none      Lines:  Peripherally Inserted Central Catheter:     Central Venous Catheter:     Venous Access Device:  Venous Access Device Power port 09/21/16 Upper chest (subclavicular area, right (Active)     Peripheral Intravenous Line:  Peripheral IV 03/09/17 Right (Active)     Hemodialysis Catheter:     Drain(s):       Labs verified: y    No results found for this or any previous visit (from the past 24 hour(s)).   Patient Vitals for the past 8 hrs:   Temp Pulse Resp BP SpO2   03/09/17 0915 97.8 °F (36.6 °C) 92 18 110/66 96 %

## 2017-03-09 NOTE — IP AVS SNAPSHOT
Current Discharge Medication List  
  
ASK your doctor about these medications Dose & Instructions Dispensing Information Comments Morning Noon Evening Bedtime BIAXIN 250 mg tablet Generic drug:  clarithromycin Your next dose is: Today, Tomorrow Other:  ____________ Dose:  250 mg Take 250 mg by mouth two (2) times a day. Refills:  0  
     
   
   
   
  
 CIPRODEX 0.3-0.1 % otic suspension Generic drug:  ciprofloxacin-dexamethasone Your next dose is: Today, Tomorrow Other:  ____________ Dose:  4 Drop Administer 4 Drops in left ear two (2) times a day. Refills:  0  
     
   
   
   
  
 cyclobenzaprine 5 mg tablet Commonly known as:  FLEXERIL Your next dose is: Today, Tomorrow Other:  ____________ Dose:  5 mg Take 5 mg by mouth three (3) times daily (with meals). Refills:  0  
     
   
   
   
  
 dexamethasone 4 mg tablet Commonly known as:  DECADRON Your next dose is: Today, Tomorrow Other:  ____________ Take two (2) tabs 1-3 hours before chemo Quantity:  22 Tab Refills:  0  
     
   
   
   
  
 HAIR,SKIN AND NAILS PO Your next dose is: Today, Tomorrow Other:  ____________ Take  by mouth. Refills:  0  
     
   
   
   
  
 ibuprofen 200 mg tablet Commonly known as:  MOTRIN Your next dose is: Today, Tomorrow Other:  ____________ Dose:  400 mg Take 400 mg by mouth every six (6) hours as needed for Pain. Stopped 8/23/16 Refills:  0  
     
   
   
   
  
 levoFLOXacin 500 mg tablet Commonly known as:  Sabina Fong Your next dose is: Today, Tomorrow Other:  ____________ Dose:  500 mg Take 1 Tab by mouth daily. Do not take until instructed. Quantity:  7 Tab Refills:  0  
     
   
   
   
  
 lidocaine-prilocaine topical cream  
Commonly known as:  EMLA Your next dose is: Today, Tomorrow Other:  ____________ Apply  to affected area as needed for Pain. Apply to port site 45-60 minutes prior to lab appt or infusion. Quantity:  30 g Refills:  0 LORazepam 0.5 mg tablet Commonly known as:  ATIVAN Your next dose is: Today, Tomorrow Other:  ____________ Dose:  0.5-1 mg Take 1-2 Tabs by mouth every eight (8) hours as needed for Anxiety. Max Daily Amount: 3 mg. Quantity:  60 Tab Refills:  2 MIRENA IU Your next dose is: Today, Tomorrow Other:  ____________  
   
   
 by IntraUTERine route. Indications: placed 2013 Refills:  0  
     
   
   
   
  
 prochlorperazine 10 mg tablet Commonly known as:  COMPAZINE Your next dose is: Today, Tomorrow Other:  ____________ Dose:  10 mg Take 1 Tab by mouth every six (6) hours as needed. Quantity:  90 Tab Refills:  2  
     
   
   
   
  
 tamoxifen 20 mg tablet Commonly known as:  NOLVADEX Your next dose is: Today, Tomorrow Other:  ____________ Dose:  20 mg Take 1 Tab by mouth daily. Quantity:  30 Tab Refills:  5  
     
   
   
   
  
 temazepam 15 mg capsule Commonly known as:  RESTORIL Your next dose is: Today, Tomorrow Other:  ____________ Dose:  15 mg Take 1 Cap by mouth nightly as needed for Sleep. Max Daily Amount: 15 mg. Quantity:  30 Cap Refills:  0

## 2017-03-09 NOTE — PROGRESS NOTES
TRANSFER - OUT REPORT:    Verbal report given to Deidre DIOR(name) on Mili Arias  being transferred to IR Recovery(unit) for routine progression of care       Report consisted of patients Situation, Background, Assessment and   Recommendations(SBAR). Information from the following report(s) SBAR, Procedure Summary and MAR was reviewed with the receiving nurse. Lines:   Peripheral IV 03/09/17 Right (Active)        Opportunity for questions and clarification was provided.       Patient transported with:   Registered Nurse

## 2017-03-09 NOTE — ACP (ADVANCE CARE PLANNING)
3/8/17 pt contacted me to report swelling in left leg only. Denied any redness or pain. Discussed with Dr. Harsha Almeida. Will order stat US to evaluate for DVT. US negative for DVT. Pt aware.

## 2017-03-09 NOTE — H&P
Interventional Radiology History and Physical (Outpatient)    3/9/2017    Patient: Farrah Oliva 31 y.o. female     Referring Physician:  Alissa Rizzo MD    Chief Complaint: port removal    History of Present Illness: right ij port 'worked great'. History:  Past Medical History:   Diagnosis Date    Cancer ([address] dx--7/28/16    left breast cancer--- plans for chemo after surg    MRSA infection 2014    LEFT EAR     Family History   Problem Relation Age of Onset    Hypertension Mother     Cancer Sister      THYROID    Bipolar Disorder Sister     Diabetes Paternal Grandmother     Stroke Paternal Grandmother     Heart Attack Maternal Grandfather     Heart Disease Maternal Grandfather      Social History     Social History    Marital status:      Spouse name: N/A    Number of children: N/A    Years of education: N/A     Occupational History    Not on file. Social History Main Topics    Smoking status: Never Smoker    Smokeless tobacco: Never Used    Alcohol use 0.0 oz/week     0 Standard drinks or equivalent per week      Comment: rare    Drug use: No    Sexual activity: Not on file     Other Topics Concern    Not on file     Social History Narrative       Allergies: Allergies   Allergen Reactions    Lortab [Hydrocodone-Acetaminophen] Itching    Zofran [Ondansetron Hcl (Pf)] Unknown (comments)     \"severe migraines\"       Prior to Admission Medications:  Prior to Admission medications    Medication Sig Start Date End Date Taking? Authorizing Provider   MULTIVITAMIN WITH MINERALS (HAIR,SKIN AND NAILS PO) Take  by mouth. Yes Historical Provider   tamoxifen (NOLVADEX) 20 mg tablet Take 1 Tab by mouth daily. 3/3/17   Helder Cobb MD   temazepam (RESTORIL) 15 mg capsule Take 1 Cap by mouth nightly as needed for Sleep. Max Daily Amount: 15 mg. 1/31/17   Anais Espinal NP   clarithromycin (BIAXIN) 250 mg tablet Take 250 mg by mouth two (2) times a day.     Historical Provider dexamethasone (DECADRON) 4 mg tablet Take two (2) tabs 1-3 hours before chemo 1/5/17   Brianne Leal MD   ciprofloxacin-dexamethasone (CIPRODEX) 0.3-0.1 % otic suspension Administer 4 Drops in left ear two (2) times a day. Historical Provider   LORazepam (ATIVAN) 0.5 mg tablet Take 1-2 Tabs by mouth every eight (8) hours as needed for Anxiety. Max Daily Amount: 3 mg. 11/3/16   Shelley Pearson NP   prochlorperazine (COMPAZINE) 10 mg tablet Take 1 Tab by mouth every six (6) hours as needed. 10/5/16   Brianne Leal MD   lidocaine-prilocaine (EMLA) topical cream Apply  to affected area as needed for Pain. Apply to port site 45-60 minutes prior to lab appt or infusion. 10/5/16   Brianne Leal MD   levofloxacin (LEVAQUIN) 500 mg tablet Take 1 Tab by mouth daily. Do not take until instructed. 10/5/16   Brianne Leal MD   cyclobenzaprine (FLEXERIL) 5 mg tablet Take 5 mg by mouth three (3) times daily (with meals). Historical Provider   LEVONORGESTREL (MIRENA IU) by IntraUTERine route. Indications: placed 2013    Historical Provider   ibuprofen (MOTRIN) 200 mg tablet Take 400 mg by mouth every six (6) hours as needed for Pain. Stopped 8/23/16    Historical Provider       Physical Exam:    Blood pressure 110/66, pulse 92, temperature 97.8 °F (36.6 °C), resp. rate 18, SpO2 96 %, not currently breastfeeding. General: alert, cooperative, no distress, appears stated age  Heart: normal S1 and S2  Lungs: clear to auscultation bilaterally    Plan of Care/Planned Procedure:  Risks, benefits, and alternatives reviewed with patient and she agrees to proceed with the procedure.      Esther San MD

## 2017-03-29 ENCOUNTER — HOSPITAL ENCOUNTER (OUTPATIENT)
Dept: SURGERY | Age: 37
Discharge: HOME | End: 2017-03-29

## 2017-03-31 VITALS — WEIGHT: 155 LBS | HEIGHT: 64 IN | BODY MASS INDEX: 26.46 KG/M2

## 2017-03-31 NOTE — PERIOP NOTES
Patient verified name, , and surgery as listed in [facility]. Type 1b surgery, Phone assessment complete. Orders have not been received. Labs per surgeon: unknown  Labs per anesthesia protocol: none    Recent labs in emr if needed DOS. Patient answered medical/surgical history questions at their best of ability. All prior to admission medications documented in [facility]. Patient instructed to take the following medications the day of surgery according to anesthesia guidelines with a small sip of water: flexeril prn, ativan prn . Hold all vitamins 7 days prior to surgery and NSAIDS 5 days prior to surgery. Medications to be held - motrin    Patient instructed on the following and verbalizes understanding:  Arrive at [facility], time of arrival to be called the day before by 1700  NPO after midnight including gum, mints, and ice chips  Responsible adult must drive patient to the hospital, stay during surgery, and patient will  need supervision 24 hours after anesthesia  Use hibiclens in shower the night before surgery and on the morning of surgery  Leave all valuables(money and jewelry) at home but bring insurance card and ID on DOS  Do not wear make-up, nail polish, lotions, cologne, perfumes, powders, or oil on skin.

## 2017-04-04 ENCOUNTER — ANESTHESIA EVENT (OUTPATIENT)
Dept: SURGERY | Age: 37
End: 2017-04-04
Payer: COMMERCIAL

## 2017-04-04 RX ORDER — FAMOTIDINE 10 MG/ML
20 INJECTION INTRAVENOUS ONCE
Status: CANCELLED | OUTPATIENT
Start: 2017-04-04 | End: 2017-04-04

## 2017-04-04 RX ORDER — FENTANYL CITRATE 50 UG/ML
100 INJECTION, SOLUTION INTRAMUSCULAR; INTRAVENOUS AS NEEDED
Status: CANCELLED | OUTPATIENT
Start: 2017-04-04

## 2017-04-05 ENCOUNTER — HOSPITAL ENCOUNTER (OUTPATIENT)
Age: 37
Setting detail: OUTPATIENT SURGERY
Discharge: HOME | End: 2017-04-05
Attending: PLASTIC SURGERY | Admitting: PLASTIC SURGERY
Payer: COMMERCIAL

## 2017-04-05 ENCOUNTER — ANESTHESIA (OUTPATIENT)
Dept: SURGERY | Age: 37
End: 2017-04-05
Payer: COMMERCIAL

## 2017-04-05 VITALS
DIASTOLIC BLOOD PRESSURE: 69 MMHG | OXYGEN SATURATION: 98 % | SYSTOLIC BLOOD PRESSURE: 103 MMHG | TEMPERATURE: 97.7 F | RESPIRATION RATE: 18 BRPM | HEART RATE: 59 BPM

## 2017-04-05 LAB — HCG UR QL: NEGATIVE

## 2017-04-05 PROCEDURE — 77030002933 HC SUT MCRYL J&J -A: Performed by: PLASTIC SURGERY

## 2017-04-05 PROCEDURE — 77030020782 HC GWN BAIR PAWS FLX 3M -B: Performed by: ANESTHESIOLOGY

## 2017-04-05 PROCEDURE — 77030011266 HC ELECTRD BLD INSL COVD -A: Performed by: PLASTIC SURGERY

## 2017-04-05 PROCEDURE — 74011250636 HC RX REV CODE- 250/636: Performed by: ANESTHESIOLOGY

## 2017-04-05 PROCEDURE — 76210000016 HC OR PH I REC 1 TO 1.5 HR: Performed by: PLASTIC SURGERY

## 2017-04-05 PROCEDURE — 74011250636 HC RX REV CODE- 250/636: Performed by: PLASTIC SURGERY

## 2017-04-05 PROCEDURE — 77030018836 HC SOL IRR NACL ICUM -A: Performed by: PLASTIC SURGERY

## 2017-04-05 PROCEDURE — 77030026227 HC FUNL KELLR 2 PCH ALGN -C: Performed by: PLASTIC SURGERY

## 2017-04-05 PROCEDURE — 77030008467 HC STPLR SKN COVD -B: Performed by: PLASTIC SURGERY

## 2017-04-05 PROCEDURE — 77030032490 HC SLV COMPR SCD KNE COVD -B: Performed by: PLASTIC SURGERY

## 2017-04-05 PROCEDURE — 77030008477 HC STYL SATN SLP COVD -A: Performed by: ANESTHESIOLOGY

## 2017-04-05 PROCEDURE — 76060000033 HC ANESTHESIA 1 TO 1.5 HR: Performed by: PLASTIC SURGERY

## 2017-04-05 PROCEDURE — 77030008703 HC TU ET UNCUF COVD -A: Performed by: ANESTHESIOLOGY

## 2017-04-05 PROCEDURE — 74011250637 HC RX REV CODE- 250/637: Performed by: ANESTHESIOLOGY

## 2017-04-05 PROCEDURE — 76010000161 HC OR TIME 1 TO 1.5 HR INTENSV-TIER 1: Performed by: PLASTIC SURGERY

## 2017-04-05 PROCEDURE — 76210000020 HC REC RM PH II FIRST 0.5 HR: Performed by: PLASTIC SURGERY

## 2017-04-05 PROCEDURE — 77030013211 HC SUPP BRA GLDA -B: Performed by: PLASTIC SURGERY

## 2017-04-05 PROCEDURE — C1789 PROSTHESIS, BREAST, IMP: HCPCS | Performed by: PLASTIC SURGERY

## 2017-04-05 PROCEDURE — 77030011640 HC PAD GRND REM COVD -A: Performed by: PLASTIC SURGERY

## 2017-04-05 PROCEDURE — 77030031139 HC SUT VCRL2 J&J -A: Performed by: PLASTIC SURGERY

## 2017-04-05 PROCEDURE — 74011000250 HC RX REV CODE- 250: Performed by: PLASTIC SURGERY

## 2017-04-05 PROCEDURE — 77030019940 HC BLNKT HYPOTHRM STRY -B: Performed by: ANESTHESIOLOGY

## 2017-04-05 PROCEDURE — 74011250636 HC RX REV CODE- 250/636

## 2017-04-05 PROCEDURE — 81025 URINE PREGNANCY TEST: CPT

## 2017-04-05 PROCEDURE — 74011000250 HC RX REV CODE- 250

## 2017-04-05 DEVICE — SMOOTH ROUND ULTRA HIGH PROFILE SILICONE GEL-FILLED BREAST IMPLANT, 480CC  SMOOTH ROUND SILICONE
Type: IMPLANTABLE DEVICE | Site: BREAST | Status: FUNCTIONAL
Brand: MENTOR MEMORYGEL BREAST IMPLANT

## 2017-04-05 RX ORDER — GLYCOPYRROLATE 0.2 MG/ML
INJECTION INTRAMUSCULAR; INTRAVENOUS AS NEEDED
Status: DISCONTINUED | OUTPATIENT
Start: 2017-04-05 | End: 2017-04-05 | Stop reason: HOSPADM

## 2017-04-05 RX ORDER — BACITRACIN ZINC 500 UNIT/G
OINTMENT (GRAM) TOPICAL AS NEEDED
Status: DISCONTINUED | OUTPATIENT
Start: 2017-04-05 | End: 2017-04-05 | Stop reason: HOSPADM

## 2017-04-05 RX ORDER — ROCURONIUM BROMIDE 10 MG/ML
INJECTION, SOLUTION INTRAVENOUS AS NEEDED
Status: DISCONTINUED | OUTPATIENT
Start: 2017-04-05 | End: 2017-04-05 | Stop reason: HOSPADM

## 2017-04-05 RX ORDER — FENTANYL CITRATE 50 UG/ML
INJECTION, SOLUTION INTRAMUSCULAR; INTRAVENOUS AS NEEDED
Status: DISCONTINUED | OUTPATIENT
Start: 2017-04-05 | End: 2017-04-05 | Stop reason: HOSPADM

## 2017-04-05 RX ORDER — SODIUM CHLORIDE, SODIUM LACTATE, POTASSIUM CHLORIDE, CALCIUM CHLORIDE 600; 310; 30; 20 MG/100ML; MG/100ML; MG/100ML; MG/100ML
1000 INJECTION, SOLUTION INTRAVENOUS CONTINUOUS
Status: DISCONTINUED | OUTPATIENT
Start: 2017-04-05 | End: 2017-04-05 | Stop reason: HOSPADM

## 2017-04-05 RX ORDER — SODIUM CHLORIDE 0.9 % (FLUSH) 0.9 %
5-10 SYRINGE (ML) INJECTION AS NEEDED
Status: DISCONTINUED | OUTPATIENT
Start: 2017-04-05 | End: 2017-04-05 | Stop reason: HOSPADM

## 2017-04-05 RX ORDER — SODIUM CHLORIDE, SODIUM LACTATE, POTASSIUM CHLORIDE, CALCIUM CHLORIDE 600; 310; 30; 20 MG/100ML; MG/100ML; MG/100ML; MG/100ML
75 INJECTION, SOLUTION INTRAVENOUS CONTINUOUS
Status: DISCONTINUED | OUTPATIENT
Start: 2017-04-05 | End: 2017-04-05 | Stop reason: HOSPADM

## 2017-04-05 RX ORDER — NEOSTIGMINE METHYLSULFATE 1 MG/ML
INJECTION INTRAVENOUS AS NEEDED
Status: DISCONTINUED | OUTPATIENT
Start: 2017-04-05 | End: 2017-04-05 | Stop reason: HOSPADM

## 2017-04-05 RX ORDER — CEFAZOLIN SODIUM IN 0.9 % NACL 2 G/50 ML
2 INTRAVENOUS SOLUTION, PIGGYBACK (ML) INTRAVENOUS ONCE
Status: COMPLETED | OUTPATIENT
Start: 2017-04-05 | End: 2017-04-05

## 2017-04-05 RX ORDER — FAMOTIDINE 20 MG/1
20 TABLET, FILM COATED ORAL ONCE
Status: COMPLETED | OUTPATIENT
Start: 2017-04-05 | End: 2017-04-05

## 2017-04-05 RX ORDER — BACITRACIN 50000 [IU]/1
INJECTION, POWDER, FOR SOLUTION INTRAMUSCULAR AS NEEDED
Status: DISCONTINUED | OUTPATIENT
Start: 2017-04-05 | End: 2017-04-05 | Stop reason: HOSPADM

## 2017-04-05 RX ORDER — ONDANSETRON 2 MG/ML
INJECTION INTRAMUSCULAR; INTRAVENOUS AS NEEDED
Status: DISCONTINUED | OUTPATIENT
Start: 2017-04-05 | End: 2017-04-05 | Stop reason: HOSPADM

## 2017-04-05 RX ORDER — LIDOCAINE HYDROCHLORIDE 10 MG/ML
0.1 INJECTION INFILTRATION; PERINEURAL AS NEEDED
Status: DISCONTINUED | OUTPATIENT
Start: 2017-04-05 | End: 2017-04-05 | Stop reason: HOSPADM

## 2017-04-05 RX ORDER — BUPIVACAINE HYDROCHLORIDE 2.5 MG/ML
INJECTION, SOLUTION EPIDURAL; INFILTRATION; INTRACAUDAL AS NEEDED
Status: DISCONTINUED | OUTPATIENT
Start: 2017-04-05 | End: 2017-04-05 | Stop reason: HOSPADM

## 2017-04-05 RX ORDER — DEXAMETHASONE SODIUM PHOSPHATE 4 MG/ML
INJECTION, SOLUTION INTRA-ARTICULAR; INTRALESIONAL; INTRAMUSCULAR; INTRAVENOUS; SOFT TISSUE AS NEEDED
Status: DISCONTINUED | OUTPATIENT
Start: 2017-04-05 | End: 2017-04-05 | Stop reason: HOSPADM

## 2017-04-05 RX ORDER — PROPOFOL 10 MG/ML
INJECTION, EMULSION INTRAVENOUS AS NEEDED
Status: DISCONTINUED | OUTPATIENT
Start: 2017-04-05 | End: 2017-04-05 | Stop reason: HOSPADM

## 2017-04-05 RX ORDER — SODIUM CHLORIDE 0.9 % (FLUSH) 0.9 %
5-10 SYRINGE (ML) INJECTION EVERY 8 HOURS
Status: DISCONTINUED | OUTPATIENT
Start: 2017-04-05 | End: 2017-04-05 | Stop reason: HOSPADM

## 2017-04-05 RX ORDER — MIDAZOLAM HYDROCHLORIDE 1 MG/ML
2 INJECTION, SOLUTION INTRAMUSCULAR; INTRAVENOUS
Status: DISCONTINUED | OUTPATIENT
Start: 2017-04-05 | End: 2017-04-05 | Stop reason: HOSPADM

## 2017-04-05 RX ORDER — HYDROMORPHONE HYDROCHLORIDE 2 MG/ML
0.5 INJECTION, SOLUTION INTRAMUSCULAR; INTRAVENOUS; SUBCUTANEOUS
Status: DISCONTINUED | OUTPATIENT
Start: 2017-04-05 | End: 2017-04-05 | Stop reason: HOSPADM

## 2017-04-05 RX ORDER — POVIDONE-IODINE 10 %
SOLUTION, NON-ORAL TOPICAL AS NEEDED
Status: DISCONTINUED | OUTPATIENT
Start: 2017-04-05 | End: 2017-04-05 | Stop reason: HOSPADM

## 2017-04-05 RX ORDER — NALOXONE HYDROCHLORIDE 0.4 MG/ML
0.1 INJECTION, SOLUTION INTRAMUSCULAR; INTRAVENOUS; SUBCUTANEOUS AS NEEDED
Status: DISCONTINUED | OUTPATIENT
Start: 2017-04-05 | End: 2017-04-05 | Stop reason: HOSPADM

## 2017-04-05 RX ADMIN — DEXAMETHASONE SODIUM PHOSPHATE 10 MG: 4 INJECTION, SOLUTION INTRA-ARTICULAR; INTRALESIONAL; INTRAMUSCULAR; INTRAVENOUS; SOFT TISSUE at 10:57

## 2017-04-05 RX ADMIN — FAMOTIDINE 20 MG: 20 TABLET, FILM COATED ORAL at 10:01

## 2017-04-05 RX ADMIN — FENTANYL CITRATE 100 MCG: 50 INJECTION, SOLUTION INTRAMUSCULAR; INTRAVENOUS at 10:43

## 2017-04-05 RX ADMIN — CEFAZOLIN 2 G: 1 INJECTION, POWDER, FOR SOLUTION INTRAMUSCULAR; INTRAVENOUS; PARENTERAL at 10:39

## 2017-04-05 RX ADMIN — PROPOFOL 200 MG: 10 INJECTION, EMULSION INTRAVENOUS at 10:43

## 2017-04-05 RX ADMIN — ONDANSETRON 4 MG: 2 INJECTION INTRAMUSCULAR; INTRAVENOUS at 10:57

## 2017-04-05 RX ADMIN — HYDROMORPHONE HYDROCHLORIDE 0.5 MG: 2 INJECTION, SOLUTION INTRAMUSCULAR; INTRAVENOUS; SUBCUTANEOUS at 12:13

## 2017-04-05 RX ADMIN — MIDAZOLAM HYDROCHLORIDE 2 MG: 1 INJECTION, SOLUTION INTRAMUSCULAR; INTRAVENOUS at 10:21

## 2017-04-05 RX ADMIN — SODIUM CHLORIDE, SODIUM LACTATE, POTASSIUM CHLORIDE, AND CALCIUM CHLORIDE 1000 ML: 600; 310; 30; 20 INJECTION, SOLUTION INTRAVENOUS at 10:02

## 2017-04-05 RX ADMIN — ROCURONIUM BROMIDE 40 MG: 10 INJECTION, SOLUTION INTRAVENOUS at 10:43

## 2017-04-05 RX ADMIN — GLYCOPYRROLATE 0.4 MG: 0.2 INJECTION INTRAMUSCULAR; INTRAVENOUS at 11:48

## 2017-04-05 RX ADMIN — SODIUM CHLORIDE, SODIUM LACTATE, POTASSIUM CHLORIDE, AND CALCIUM CHLORIDE: 600; 310; 30; 20 INJECTION, SOLUTION INTRAVENOUS at 10:35

## 2017-04-05 RX ADMIN — NEOSTIGMINE METHYLSULFATE 3 MG: 1 INJECTION INTRAVENOUS at 11:48

## 2017-04-05 NOTE — ANESTHESIA PREPROCEDURE EVALUATION
Anesthetic History   No history of anesthetic complications            Review of Systems / Medical History  Pertinent labs reviewed    Pulmonary  Within defined limits                 Neuro/Psych   Within defined limits           Cardiovascular  Within defined limits                Exercise tolerance: >4 METS     GI/Hepatic/Renal  Within defined limits              Endo/Other        Cancer (breast)     Other Findings              Physical Exam    Airway  Mallampati: I  TM Distance: 4 - 6 cm  Neck ROM: normal range of motion   Mouth opening: Normal     Cardiovascular  Regular rate and rhythm,  S1 and S2 normal,  no murmur, click, rub, or gallop             Dental  No notable dental hx       Pulmonary  Breath sounds clear to auscultation               Abdominal  GI exam deferred       Other Findings            Anesthetic Plan    ASA: 2  Anesthesia type: general          Induction: Intravenous  Anesthetic plan and risks discussed with: Patient

## 2017-04-05 NOTE — IP AVS SNAPSHOT
[facility] 
 
 
 [address] [place] [address] 
[phone] Patient: Bertha Connor MRN: [id number] CHD:[id number]/36/[id number] You are allergic to the following Allergen Reactions Lortab (Hydrocodone-Acetaminophen) Itching Zofran (Ondansetron Hcl (Pf)) Unknown (comments) \"severe migraines\" Recent Documentation OB Status Smoking Status IUD Never Smoker Emergency Contacts Name Discharge Info Relation Home Work Mobile Ben Russell DISCHARGE CAREGIVER [3] Spouse [3] [phone] About your hospitalization You were admitted on:  April 5, 2017 You last received care in the:  [facility] PACU You were discharged on:  April 5, 2017 Unit phone number:  [phone] Why you were hospitalized Your primary diagnosis was:  Not on File Providers Seen During Your Hospitalizations Provider Role Specialty Primary office phone Carlene Ontiveros MD Attending Provider Plastic Surgery [phone] Your Primary Care Physician (PCP) Primary Care Physician Office Phone Office Fax NONE ** None ** ** None ** Follow-up Information Follow up With Details Comments Contact Info None   None ([phone]) Patient stated that they have no PCP Carlene Ontiveros MD Schedule an appointment as soon as possible for a visit in 1 week(s)  [address] [place] [address] Surgery [facility] [place] [id number] 
[phone] Current Discharge Medication List  
  
CONTINUE these medications which have CHANGED Dose & Instructions Dispensing Information Comments Morning Noon Evening Bedtime  
 tamoxifen 20 mg tablet Commonly known as:  NOLVADEX What changed:  additional instructions Your last dose was: Your next dose is:    
   
   
 Dose:  20 mg Take 1 Tab by mouth daily. Quantity:  30 Tab Refills:  5 CONTINUE these medications which have NOT CHANGED Dose & Instructions Dispensing Information Comments Morning Noon Evening Bedtime  
 cyclobenzaprine 5 mg tablet Commonly known as:  FLEXERIL Your last dose was: Your next dose is:    
   
   
 Dose:  5 mg Take 5 mg by mouth three (3) times daily (with meals). Refills:  0  
     
   
   
   
  
 HAIR,SKIN AND NAILS PO Your last dose was: Your next dose is: Take  by mouth. Refills:  0 LORazepam 0.5 mg tablet Commonly known as:  ATIVAN Your last dose was: Your next dose is:    
   
   
 Dose:  0.5-1 mg Take 1-2 Tabs by mouth every eight (8) hours as needed for Anxiety. Max Daily Amount: 3 mg. Quantity:  60 Tab Refills:  2 MIRENA IU Your last dose was: Your next dose is:    
   
   
 by IntraUTERine route. Indications: placed 2013 Refills:  0 STOP taking these medications   
 dexamethasone 4 mg tablet Commonly known as:  DECADRON  
   
  
 ibuprofen 200 mg tablet Commonly known as:  MOTRIN  
   
  
 prochlorperazine 10 mg tablet Commonly known as:  COMPAZINE  
   
  
 temazepam 15 mg capsule Commonly known as:  RESTORIL Discharge Instructions Follow up in 1 week.   
Leave dressing on for 48 hours, then may shower. Apply bacitracin and gauze to incisions daily. Wear bra for support. Instructions Following Ambulatory Surgery Activity · As tolerated and directed by your doctor · Bathe or shower as directed by your doctor Diet · Clear liquids until no nausea or vomiting; then light diet for the first day · Advance to regular diet on second day, unless your doctor orders otherwise · If nausea and vomiting continues, call your doctor Pain · Take pain medication as directed by your doctor ·  Call your doctor if pain is NOT relieved by medication · DO NOT take aspirin or blood thinners until directed by your doctor Follow-Up Phone Calls · Will be made nursing staff · If you have any problems, call your doctor as needed Call your doctor if 
· Excessive bleeding that does not stop after holding mild pressure over the area · Temperature of 101 degrees F or above · Redness,excessive swelling or bruising, and/or green or yellow, smelly discharge from incision After Anesthesia · For the first 24 hours: DO NOT Drive, Drink alcoholic beverages, or Make important decisions · Be aware of dizziness following anesthesia and while taking pain medication Discharge Orders None Introducing [facility]! Dear Toni: 
Thank you for requesting a ERN account. Our records indicate that you already have an active ERN account. You can access your account anytime at [email or web address] Did you know that you can access your hospital and ER discharge instructions at any time in ERN? You can also review all of your test results from your hospital stay or ER visit. Additional Information If you have questions, please visit the Frequently Asked Questions section of the ERN website at [email or web address] Remember, ERN is NOT to be used for urgent needs. For medical emergencies, dial 911. Now available from your iPhone and Android! General Information Please provide this summary of care documentation to your next provider. Patient Signature:  ____________________________________________________________ Date:  ____________________________________________________________  
  
Rosettaa Star Provider Signature:  ____________________________________________________________ Date:  ____________________________________________________________

## 2017-04-05 NOTE — BRIEF OP NOTE
BRIEF OPERATIVE NOTE    Date of Procedure: 4/5/2017   Preoperative Diagnosis: Malignant neoplasm of left female breast, unspecified site [facility] [C50.912]  Postoperative Diagnosis: Malignant neoplasm of left female breast, unspecified site of breast ([address] [C50.912]    Procedure(s):  BILATERAL BREAST TISSUE EXPANDER REMOVAL /PLACEMENT OF PERMANENT IMPLANTS  Surgeon(s) and Role:     * Ike Graves MD - Primary            Surgical Staff:  Circ-1: Hitesh Stark RN  Circ-2: Sabina Arias RN  Scrub Tech-1: Gertrude Tyson  Scrub Tech-2: Tram Cabrera  Event Time In   Incision Start 1055   Incision Close      Anesthesia: General   Estimated Blood Loss: 20cc  Specimens none  Findings: none  Complications: none  Implants:   Implant Name Type Inv.  Item Serial No.  Lot No. LRB No. Used Action   IMPL BRST GEL SR ULTR HI 480ML -- COHESIVE I - [id number]  IMPL BRST GEL SR ULTR HI 480ML -- COHESIVE I [id number] [employer] [id number] Left 1 Implanted   IMPL BRST GEL SR ULTR HI 480ML -- COHESIVE I - [id number]   IMPL BRST GEL SR ULTR HI 480ML -- COHESIVE I [id number] [employer] [id number] Right 1 Implanted

## 2017-04-05 NOTE — DISCHARGE INSTRUCTIONS
Follow up in 1 week.    Leave dressing on for 48 hours, then may shower. Apply bacitracin and gauze to incisions daily. Wear bra for support.       Instructions Following Ambulatory Surgery    Activity  · As tolerated and directed by your doctor  · Bathe or shower as directed by your doctor    Diet  · Clear liquids until no nausea or vomiting; then light diet for the first day  · Advance to regular diet on second day, unless your doctor orders otherwise  · If nausea and vomiting continues, call your doctor    Pain  · Take pain medication as directed by your doctor  ·  Call your doctor if pain is NOT relieved by medication  · DO NOT take aspirin or blood thinners until directed by your doctor    Follow-Up Phone Calls  · Will be made nursing staff  · If you have any problems, call your doctor as needed    Call your doctor if  · Excessive bleeding that does not stop after holding mild pressure over the area  · Temperature of 101 degrees F or above  · Redness,excessive swelling or bruising, and/or green or yellow, smelly discharge from incision    After Anesthesia  · For the first 24 hours: DO NOT Drive, Drink alcoholic beverages, or Make important decisions  · Be aware of dizziness following anesthesia and while taking pain medication

## 2017-04-05 NOTE — ANESTHESIA POSTPROCEDURE EVALUATION
Post-Anesthesia Evaluation and Assessment    Patient: Zia Grace MRN: [id number]  SSN: [id number]    YOB: 1980  Age: 31 y.o. Sex: female       Cardiovascular Function/Vital Signs  Visit Vitals    /70    Pulse (!) 58    Temp 36.5 °C (97.7 °F)    Resp 20    SpO2 98%       Patient is status post general anesthesia for Procedure(s):  BILATERAL BREAST TISSUE EXPANDER REMOVAL /PLACEMENT OF PERMANENT IMPLANTS. Nausea/Vomiting: None    Postoperative hydration reviewed and adequate. Pain:  Pain Scale 1: Numeric (0 - 10) (04/05/17 1235)  Pain Intensity 1: 0 (04/05/17 1235)   Managed    Neurological Status:   Neuro (WDL): Within Defined Limits (04/05/17 1235)  Neuro  Neurologic State: Alert (04/05/17 1235)  Orientation Level: Oriented to person;Oriented to place (04/05/17 1235)  LUE Motor Response: Purposeful (04/05/17 1235)  RUE Motor Response: Purposeful (04/05/17 1235)   At baseline    Mental Status and Level of Consciousness: Arousable    Pulmonary Status:   O2 Device: Room air (04/05/17 1248)   Adequate oxygenation and airway patent    Complications related to anesthesia: None    Post-anesthesia assessment completed.  No concerns    Signed By: Moe Anegl MD     April 5, 2017

## 2017-04-05 NOTE — BRIEF OP NOTE
BRIEF OPERATIVE NOTE    Date of Procedure: 4/5/2017   Preoperative Diagnosis: Malignant ne[facility] of left female breast, unspecified site [facility] [C50.912]  Postoperative Diagnosis: Malignant neoplasm of left female breast, unspecified site of breast [facility] [C50.912]    Procedure(s):  BILATERAL BREAST TISSUE EXPANDER REMOVAL /PLACEMENT OF PERMANENT IMPLANTS  Surgeon(s) and Role:     * Jon Morris MD - Primary            Surgical Staff:  Circ-1: Nikolas Howard RN  Circ-2: Shaheen Mcdonough RN  Scrub Tech-1: Ronny Tyson  Scrub Tech-2: Gerardo Cabrera  Event Time In   Incision Start 1055   Incision Close      Anesthesia: General   Estimated Blood Loss: 20cc  Specimens: none   Findings: none  Complications: none  Implants:   Implant Name Type Inv.  Item Serial No.  Lot No. LRB No. Used Action   IMPL BRST GEL SR ULTR HI 480ML -- COHESIVE I - [id number]  IMPL BRST GEL SR ULTR HI 480ML -- COHESIVE I [id number] [employer] [id number] Left 1 Implanted   IMPL BRST GEL SR ULTR HI 480ML -- COHESIVE I - [id number]   IMPL BRST GEL SR ULTR HI 480ML -- COHESIVE I [id number] [employer] [id number] Right 1 Implanted

## 2017-04-06 NOTE — OP NOTES
[address] [facility]   OPERATIVE REPORT       Name:  Simba Thomas   MR#:  [id number]   :  1980   Account #:  [de-identified]   Date of Adm:  2017       DATE OF SURGERY: 2017. PREOPERATIVE DIAGNOSIS: Breast cancer. POSTOPERATIVE DIAGNOSIS: Breast cancer. PROCEDURE: Bilateral removal of tissue expanders with placement   of permanent gel implant. SURGEON: Alfredo Srivastava MD.    ANESTHESIA: General.    SPECIMENS: None. ESTIMATED BLOOD LOSS: Minimal.    COMPLICATIONS: None. INDICATIONS: The patient presents status post her first phase of   reconstruction with tissue expanders, now ready for exchange. PROCEDURE: After informed consent was obtained from the patient,   she was marked in the holding area in an upright position. then   taken to the recovery room, placed in supine position, prepped   and draped in the usual fashion. She had superiorly based nipple   sparing incision. The lateral portion had widened a little bit,   so I excised that an elliptical fashion. The left side was   dissected first. Here the 15 blade was used to revise the scar   and open the incision and then Bovie cautery was used to dissect   through the muscle. Next then the tissue expander was deflated   and removed and then the pocket was inspected. On the left side,   she needed a fair amount of movement of the pocket medially as   well as inferiorly so capsulotomies were made accordingly, and   then a sizer was placed in the pocket. Both a 450 and 480 sizer   was assessed and the 480 seemed to fit the nicest and give her   the size and projection she wanted. On the right side, the same   technique was used. Here the capsulotomy was more superior and   sizer was placed on that side as well. She was then inspected in   the upright position for symmetry.  Some additional radial   scoring was then done on the left side to make the pockets more   symmetric and then she was placed back in the supine position. Pockets were prepared with antibiotic irrigation and skin   reprepped with Betadine and then a 480 smooth round Ultra high   profile gel implant from [place] was selected for both sides. These were placed in the [facility] funnel after being soaked in   antibiotic irrigation and then the pockets were closed with 3-0   Vicryl through the muscle, another layer in the subdermal layer,   and then a running 4-0 Monocryl subcuticular stitch bilaterally. She was dressed then with Xeroform, bacitracin, gauze, and ABD   pads, and placed in a surgical bra. She tolerated the procedure   very well and was escorted to recovery in stable condition.         MD Alee Sotut   D:  04/06/2017   09:06   T:  04/06/2017   10:47   Job #:  [id number]

## 2017-05-31 ENCOUNTER — HOSPITAL ENCOUNTER (OUTPATIENT)
Dept: LAB | Age: 37
Discharge: HOME | End: 2017-05-31
Payer: COMMERCIAL

## 2017-05-31 ENCOUNTER — PATIENT OUTREACH (OUTPATIENT)
Dept: CASE MANAGEMENT | Age: 37
End: 2017-05-31

## 2017-05-31 DIAGNOSIS — C50.312 MALIGNANT NEOPLASM OF LOWER-INNER QUADRANT OF LEFT FEMALE BREAST (HCC): ICD-10-CM

## 2017-05-31 LAB
ALBUMIN SERPL BCP-MCNC: 3.9 G/DL (ref 3.5–5)
ALBUMIN/GLOB SERPL: 1.3 {RATIO} (ref 1.2–3.5)
ALP SERPL-CCNC: 94 U/L (ref 50–136)
ALT SERPL-CCNC: 21 U/L (ref 12–65)
ANION GAP BLD CALC-SCNC: 4 MMOL/L (ref 7–16)
AST SERPL W P-5'-P-CCNC: 17 U/L (ref 15–37)
BASOPHILS # BLD AUTO: 0 K/UL (ref 0–0.2)
BASOPHILS # BLD: 1 % (ref 0–2)
BILIRUB SERPL-MCNC: 1.5 MG/DL (ref 0.2–1.1)
BUN SERPL-MCNC: 12 MG/DL (ref 6–23)
CALCIUM SERPL-MCNC: 8.9 MG/DL (ref 8.3–10.4)
CANCER AG15-3 SERPL-ACNC: 19.7 U/ML (ref 1–35)
CHLORIDE SERPL-SCNC: 108 MMOL/L (ref 98–107)
CO2 SERPL-SCNC: 29 MMOL/L (ref 21–32)
CREAT SERPL-MCNC: 0.75 MG/DL (ref 0.6–1)
DIFFERENTIAL METHOD BLD: ABNORMAL
EOSINOPHIL # BLD: 0.1 K/UL (ref 0–0.8)
EOSINOPHIL NFR BLD: 1 % (ref 0.5–7.8)
ERYTHROCYTE [DISTWIDTH] IN BLOOD BY AUTOMATED COUNT: 12 % (ref 11.9–14.6)
GLOBULIN SER CALC-MCNC: 3 G/DL (ref 2.3–3.5)
GLUCOSE SERPL-MCNC: 97 MG/DL (ref 65–100)
HCT VFR BLD AUTO: 37.3 % (ref 35.8–46.3)
HGB BLD-MCNC: 12.7 G/DL (ref 11.7–15.4)
LYMPHOCYTES # BLD AUTO: 38 % (ref 13–44)
LYMPHOCYTES # BLD: 1.6 K/UL (ref 0.5–4.6)
MCH RBC QN AUTO: 30.9 PG (ref 26.1–32.9)
MCHC RBC AUTO-ENTMCNC: 34 G/DL (ref 31.4–35)
MCV RBC AUTO: 90.8 FL (ref 79.6–97.8)
MONOCYTES # BLD: 0.3 K/UL (ref 0.1–1.3)
MONOCYTES NFR BLD AUTO: 8 % (ref 4–12)
NEUTS SEG # BLD: 2.3 K/UL (ref 1.7–8.2)
NEUTS SEG NFR BLD AUTO: 53 % (ref 43–78)
NRBC # BLD: 0 K/UL (ref 0–0.2)
PLATELET # BLD AUTO: 156 K/UL (ref 150–450)
PMV BLD AUTO: 9.8 FL (ref 10.8–14.1)
POTASSIUM SERPL-SCNC: 4 MMOL/L (ref 3.5–5.1)
PROT SERPL-MCNC: 6.9 G/DL (ref 6.3–8.2)
RBC # BLD AUTO: 4.11 M/UL (ref 4.05–5.25)
SODIUM SERPL-SCNC: 141 MMOL/L (ref 136–145)
WBC # BLD AUTO: 4.3 K/UL (ref 4.3–11.1)

## 2017-05-31 PROCEDURE — 85025 COMPLETE CBC W/AUTO DIFF WBC: CPT | Performed by: INTERNAL MEDICINE

## 2017-05-31 PROCEDURE — 86300 IMMUNOASSAY TUMOR CA 15-3: CPT | Performed by: INTERNAL MEDICINE

## 2017-05-31 PROCEDURE — 36415 COLL VENOUS BLD VENIPUNCTURE: CPT | Performed by: INTERNAL MEDICINE

## 2017-05-31 PROCEDURE — 80053 COMPREHEN METABOLIC PANEL: CPT | Performed by: INTERNAL MEDICINE

## 2017-05-31 NOTE — PROGRESS NOTES
5/31/17 saw pt today with Dr. Loreto Radford for follow up breast cancer. Started tamoxifen about 1 - 2 weeks ago, tolerating well. Denies any issues. Reconstruction completed. PO intake is good. Survivorship care plan reviewed with the pt and encouraged her to establish and share with PCP. Follow up in 4 months.   Navigation will sign off

## 2017-09-29 ENCOUNTER — HOSPITAL ENCOUNTER (OUTPATIENT)
Dept: LAB | Age: 37
Discharge: HOME | End: 2017-09-29
Payer: COMMERCIAL

## 2017-09-29 DIAGNOSIS — C50.312 MALIGNANT NEOPLASM OF LOWER-INNER QUADRANT OF LEFT FEMALE BREAST (HCC): ICD-10-CM

## 2017-09-29 LAB
ALBUMIN SERPL-MCNC: 3.8 G/DL (ref 3.5–5)
ALBUMIN/GLOB SERPL: 1.2 {RATIO} (ref 1.2–3.5)
ALP SERPL-CCNC: 90 U/L (ref 50–136)
ALT SERPL-CCNC: 16 U/L (ref 12–65)
ANION GAP SERPL CALC-SCNC: 5 MMOL/L (ref 7–16)
AST SERPL-CCNC: 11 U/L (ref 15–37)
BASOPHILS # BLD: 0 K/UL (ref 0–0.2)
BASOPHILS NFR BLD: 0 % (ref 0–2)
BILIRUB SERPL-MCNC: 1.5 MG/DL (ref 0.2–1.1)
BUN SERPL-MCNC: 10 MG/DL (ref 6–23)
CALCIUM SERPL-MCNC: 8.9 MG/DL (ref 8.3–10.4)
CANCER AG15-3 SERPL-ACNC: 21.1 U/ML (ref 1–35)
CHLORIDE SERPL-SCNC: 108 MMOL/L (ref 98–107)
CO2 SERPL-SCNC: 26 MMOL/L (ref 21–32)
CREAT SERPL-MCNC: 0.7 MG/DL (ref 0.6–1)
DIFFERENTIAL METHOD BLD: ABNORMAL
EOSINOPHIL # BLD: 0.1 K/UL (ref 0–0.8)
EOSINOPHIL NFR BLD: 1 % (ref 0.5–7.8)
ERYTHROCYTE [DISTWIDTH] IN BLOOD BY AUTOMATED COUNT: 12.3 % (ref 11.9–14.6)
GLOBULIN SER CALC-MCNC: 3.3 G/DL (ref 2.3–3.5)
GLUCOSE SERPL-MCNC: 114 MG/DL (ref 65–100)
HCT VFR BLD AUTO: 37.2 % (ref 35.8–46.3)
HGB BLD-MCNC: 13.1 G/DL (ref 11.7–15.4)
LYMPHOCYTES # BLD: 1.1 K/UL (ref 0.5–4.6)
LYMPHOCYTES NFR BLD: 18 % (ref 13–44)
MCH RBC QN AUTO: 31.7 PG (ref 26.1–32.9)
MCHC RBC AUTO-ENTMCNC: 35.2 G/DL (ref 31.4–35)
MCV RBC AUTO: 90.1 FL (ref 79.6–97.8)
MONOCYTES # BLD: 0.4 K/UL (ref 0.1–1.3)
MONOCYTES NFR BLD: 7 % (ref 4–12)
NEUTS SEG # BLD: 4.5 K/UL (ref 1.7–8.2)
NEUTS SEG NFR BLD: 74 % (ref 43–78)
NRBC # BLD: 0 K/UL (ref 0–0.2)
PLATELET # BLD AUTO: 155 K/UL (ref 150–450)
PMV BLD AUTO: 9.8 FL (ref 10.8–14.1)
POTASSIUM SERPL-SCNC: 4.1 MMOL/L (ref 3.5–5.1)
PROT SERPL-MCNC: 7.1 G/DL (ref 6.3–8.2)
RBC # BLD AUTO: 4.13 M/UL (ref 4.05–5.25)
SODIUM SERPL-SCNC: 139 MMOL/L (ref 136–145)
WBC # BLD AUTO: 6.1 K/UL (ref 4.3–11.1)

## 2017-09-29 PROCEDURE — 36415 COLL VENOUS BLD VENIPUNCTURE: CPT | Performed by: INTERNAL MEDICINE

## 2017-09-29 PROCEDURE — 86300 IMMUNOASSAY TUMOR CA 15-3: CPT | Performed by: INTERNAL MEDICINE

## 2017-09-29 PROCEDURE — 85025 COMPLETE CBC W/AUTO DIFF WBC: CPT | Performed by: INTERNAL MEDICINE

## 2017-09-29 PROCEDURE — 80053 COMPREHEN METABOLIC PANEL: CPT | Performed by: INTERNAL MEDICINE

## 2018-02-16 PROBLEM — Z30.433 ENCOUNTER FOR IUD REMOVAL AND REINSERTION: Status: ACTIVE | Noted: 2018-02-16

## 2019-01-13 ENCOUNTER — ANESTHESIA EVENT (OUTPATIENT)
Dept: SURGERY | Age: 39
End: 2019-01-13
Payer: COMMERCIAL

## 2019-01-14 ENCOUNTER — ANESTHESIA (OUTPATIENT)
Dept: SURGERY | Age: 39
End: 2019-01-14
Payer: COMMERCIAL

## 2019-01-14 ENCOUNTER — HOSPITAL ENCOUNTER (OUTPATIENT)
Age: 39
Setting detail: OUTPATIENT SURGERY
Discharge: HOME | End: 2019-01-14
Attending: ORTHOPAEDIC SURGERY | Admitting: ORTHOPAEDIC SURGERY
Payer: COMMERCIAL

## 2019-01-14 VITALS
BODY MASS INDEX: 29.87 KG/M2 | TEMPERATURE: 97.7 F | OXYGEN SATURATION: 98 % | WEIGHT: 174 LBS | HEART RATE: 67 BPM | DIASTOLIC BLOOD PRESSURE: 71 MMHG | SYSTOLIC BLOOD PRESSURE: 103 MMHG | RESPIRATION RATE: 14 BRPM

## 2019-01-14 LAB — HCG UR QL: NEGATIVE

## 2019-01-14 PROCEDURE — 74011250636 HC RX REV CODE- 250/636: Performed by: ANESTHESIOLOGY

## 2019-01-14 PROCEDURE — 74011250636 HC RX REV CODE- 250/636

## 2019-01-14 PROCEDURE — 77030002933 HC SUT MCRYL J&J -A: Performed by: ORTHOPAEDIC SURGERY

## 2019-01-14 PROCEDURE — 77030000032 HC CUF TRNQT ZIMM -B: Performed by: ORTHOPAEDIC SURGERY

## 2019-01-14 PROCEDURE — 76060000032 HC ANESTHESIA 0.5 TO 1 HR: Performed by: ORTHOPAEDIC SURGERY

## 2019-01-14 PROCEDURE — 76010000154 HC OR TIME FIRST 0.5 HR: Performed by: ORTHOPAEDIC SURGERY

## 2019-01-14 PROCEDURE — 77030018836 HC SOL IRR NACL ICUM -A: Performed by: ORTHOPAEDIC SURGERY

## 2019-01-14 PROCEDURE — 74011000250 HC RX REV CODE- 250: Performed by: ORTHOPAEDIC SURGERY

## 2019-01-14 PROCEDURE — 81025 URINE PREGNANCY TEST: CPT

## 2019-01-14 PROCEDURE — 77030002916 HC SUT ETHLN J&J -A: Performed by: ORTHOPAEDIC SURGERY

## 2019-01-14 PROCEDURE — 76210000020 HC REC RM PH II FIRST 0.5 HR: Performed by: ORTHOPAEDIC SURGERY

## 2019-01-14 PROCEDURE — 76210000063 HC OR PH I REC FIRST 0.5 HR: Performed by: ORTHOPAEDIC SURGERY

## 2019-01-14 RX ORDER — MIDAZOLAM HYDROCHLORIDE 1 MG/ML
2 INJECTION, SOLUTION INTRAMUSCULAR; INTRAVENOUS ONCE
Status: COMPLETED | OUTPATIENT
Start: 2019-01-14 | End: 2019-01-14

## 2019-01-14 RX ORDER — BUPIVACAINE HYDROCHLORIDE 5 MG/ML
INJECTION, SOLUTION EPIDURAL; INTRACAUDAL AS NEEDED
Status: DISCONTINUED | OUTPATIENT
Start: 2019-01-14 | End: 2019-01-14 | Stop reason: HOSPADM

## 2019-01-14 RX ORDER — HYDROMORPHONE HYDROCHLORIDE 2 MG/ML
0.5 INJECTION, SOLUTION INTRAMUSCULAR; INTRAVENOUS; SUBCUTANEOUS
Status: DISCONTINUED | OUTPATIENT
Start: 2019-01-14 | End: 2019-01-14 | Stop reason: HOSPADM

## 2019-01-14 RX ORDER — SODIUM CHLORIDE, SODIUM LACTATE, POTASSIUM CHLORIDE, CALCIUM CHLORIDE 600; 310; 30; 20 MG/100ML; MG/100ML; MG/100ML; MG/100ML
100 INJECTION, SOLUTION INTRAVENOUS CONTINUOUS
Status: DISCONTINUED | OUTPATIENT
Start: 2019-01-14 | End: 2019-01-14 | Stop reason: HOSPADM

## 2019-01-14 RX ORDER — SODIUM CHLORIDE 0.9 % (FLUSH) 0.9 %
5-40 SYRINGE (ML) INJECTION AS NEEDED
Status: DISCONTINUED | OUTPATIENT
Start: 2019-01-14 | End: 2019-01-14 | Stop reason: HOSPADM

## 2019-01-14 RX ORDER — NALOXONE HYDROCHLORIDE 0.4 MG/ML
0.2 INJECTION, SOLUTION INTRAMUSCULAR; INTRAVENOUS; SUBCUTANEOUS AS NEEDED
Status: DISCONTINUED | OUTPATIENT
Start: 2019-01-14 | End: 2019-01-14 | Stop reason: HOSPADM

## 2019-01-14 RX ORDER — SODIUM CHLORIDE 0.9 % (FLUSH) 0.9 %
5-40 SYRINGE (ML) INJECTION EVERY 8 HOURS
Status: DISCONTINUED | OUTPATIENT
Start: 2019-01-14 | End: 2019-01-14 | Stop reason: HOSPADM

## 2019-01-14 RX ORDER — FLUMAZENIL 0.1 MG/ML
0.2 INJECTION INTRAVENOUS
Status: DISCONTINUED | OUTPATIENT
Start: 2019-01-14 | End: 2019-01-14 | Stop reason: HOSPADM

## 2019-01-14 RX ORDER — PROPOFOL 10 MG/ML
INJECTION, EMULSION INTRAVENOUS
Status: DISCONTINUED | OUTPATIENT
Start: 2019-01-14 | End: 2019-01-14 | Stop reason: HOSPADM

## 2019-01-14 RX ORDER — FENTANYL CITRATE 50 UG/ML
100 INJECTION, SOLUTION INTRAMUSCULAR; INTRAVENOUS ONCE
Status: DISCONTINUED | OUTPATIENT
Start: 2019-01-14 | End: 2019-01-14 | Stop reason: HOSPADM

## 2019-01-14 RX ORDER — SODIUM CHLORIDE, SODIUM LACTATE, POTASSIUM CHLORIDE, CALCIUM CHLORIDE 600; 310; 30; 20 MG/100ML; MG/100ML; MG/100ML; MG/100ML
INJECTION, SOLUTION INTRAVENOUS
Status: DISCONTINUED | OUTPATIENT
Start: 2019-01-14 | End: 2019-01-14 | Stop reason: HOSPADM

## 2019-01-14 RX ORDER — DIPHENHYDRAMINE HYDROCHLORIDE 50 MG/ML
12.5 INJECTION, SOLUTION INTRAMUSCULAR; INTRAVENOUS
Status: DISCONTINUED | OUTPATIENT
Start: 2019-01-14 | End: 2019-01-14 | Stop reason: HOSPADM

## 2019-01-14 RX ORDER — LIDOCAINE HYDROCHLORIDE 10 MG/ML
0.1 INJECTION INFILTRATION; PERINEURAL AS NEEDED
Status: DISCONTINUED | OUTPATIENT
Start: 2019-01-14 | End: 2019-01-14 | Stop reason: HOSPADM

## 2019-01-14 RX ORDER — MIDAZOLAM HYDROCHLORIDE 1 MG/ML
2 INJECTION, SOLUTION INTRAMUSCULAR; INTRAVENOUS
Status: DISCONTINUED | OUTPATIENT
Start: 2019-01-14 | End: 2019-01-14 | Stop reason: HOSPADM

## 2019-01-14 RX ORDER — CEFAZOLIN SODIUM/WATER 2 G/20 ML
2 SYRINGE (ML) INTRAVENOUS ONCE
Status: DISCONTINUED | OUTPATIENT
Start: 2019-01-14 | End: 2019-01-14 | Stop reason: HOSPADM

## 2019-01-14 RX ORDER — OXYCODONE HYDROCHLORIDE 5 MG/1
10 TABLET ORAL
Status: DISCONTINUED | OUTPATIENT
Start: 2019-01-14 | End: 2019-01-14 | Stop reason: HOSPADM

## 2019-01-14 RX ORDER — PROPOFOL 10 MG/ML
INJECTION, EMULSION INTRAVENOUS AS NEEDED
Status: DISCONTINUED | OUTPATIENT
Start: 2019-01-14 | End: 2019-01-14 | Stop reason: HOSPADM

## 2019-01-14 RX ORDER — OXYCODONE HYDROCHLORIDE 5 MG/1
5 TABLET ORAL
Status: DISCONTINUED | OUTPATIENT
Start: 2019-01-14 | End: 2019-01-14 | Stop reason: HOSPADM

## 2019-01-14 RX ORDER — ACETAMINOPHEN 500 MG
1000 TABLET ORAL ONCE
Status: DISCONTINUED | OUTPATIENT
Start: 2019-01-14 | End: 2019-01-14 | Stop reason: HOSPADM

## 2019-01-14 RX ADMIN — SODIUM CHLORIDE, SODIUM LACTATE, POTASSIUM CHLORIDE, AND CALCIUM CHLORIDE 100 ML/HR: 600; 310; 30; 20 INJECTION, SOLUTION INTRAVENOUS at 06:20

## 2019-01-14 RX ADMIN — LIDOCAINE HYDROCHLORIDE 80 MG: 10 INJECTION, SOLUTION INFILTRATION; PERINEURAL at 07:08

## 2019-01-14 RX ADMIN — PROPOFOL 50 MG: 10 INJECTION, EMULSION INTRAVENOUS at 07:17

## 2019-01-14 RX ADMIN — PROPOFOL 140 MCG/KG/MIN: 10 INJECTION, EMULSION INTRAVENOUS at 07:08

## 2019-01-14 RX ADMIN — MIDAZOLAM HYDROCHLORIDE 2 MG: 2 INJECTION, SOLUTION INTRAMUSCULAR; INTRAVENOUS at 07:01

## 2019-01-14 RX ADMIN — SODIUM CHLORIDE, SODIUM LACTATE, POTASSIUM CHLORIDE, CALCIUM CHLORIDE: 600; 310; 30; 20 INJECTION, SOLUTION INTRAVENOUS at 07:08

## 2019-01-14 NOTE — OP NOTES
[facility] REPORT    Jaqueline Garcia  MR#: [id number]  : 1980  ACCOUNT #: [de-identified]   DATE OF SERVICE: 2019    PREOPERATIVE DIAGNOSIS:  Right foot soft tissue mass. POSTOPERATIVE DIAGNOSIS:  Right foot soft tissue mass. PROCEDURE PERFORMED:  Right foot soft tissue mass excision, subcutaneous, 1 x 1 cm. SURGEON:  Ivone Monaco MD        ANESTHESIA:  Local anesthesia with monitored anesthesia care. ESTIMATED BLOOD LOSS:  Minimal.    .      TOURNIQUET TIME:  8 minutes at 250 mmHg. ANTIBIOTIC PROPHYLAXIS:  Ancef given prior to the procedure. INDICATIONS:  The patient is a 57-year-old white female with symptomatic right medial foot soft tissue mass, who has failed conservative therapy and desires surgical treatment. Risks and benefits of the procedure including but not limited to risk of anesthetic complication, myocardial infarction, stroke and death as well as surgical complications including damage to nerves and blood vessels, risk for infection, incomplete pain relief, risk of recurrence, need for additional surgery were discussed with the patient. She understands the risks and wishes to proceed with surgery at this time. DETAILS OF PROCEDURE:  The patient's operative site was marked with indelible ink in the preoperative holding area. She was brought to the operating room and placed supine. After preoperative surgical timeout, the right lower extremity was identified as the surgical site and prepped and draped in standard sterile fashion with ChloraPrep solution. A field block was obtained with 0.5% plain Marcaine. An incision was made over the mass on the medial aspect of the foot. The anterior tibial tendon and cutaneous nerve were carefully protected. An underlying mass was identified and was excised without difficulty and the origin of the ganglion cyst was identified and cauterized and the mass was sent to pathology. The wound was irrigated and closed using Monocryl and nylon sutures. A sterile dressing was then applied and anesthesia was discontinued. The patient was transferred to the recovery bed and taken to recovery room in satisfactory condition. She appeared to tolerate the procedure well with no apparent surgical or anesthetic complications. All needle and sponge counts were correct.       MD CAROLYN Adamson / FLORIN  D: 01/14/2019 07:42     T: 01/14/2019 08:03  JOB #: [id number]

## 2019-01-14 NOTE — DISCHARGE INSTRUCTIONS
INSTRUCTIONS FOLLOWING FOOT SURGERY    ACTIVITY  Elevate foot. No Ice  Protected partial weight bearing on the heel only as tolerated in post op shoe after full sensation returns. Let the office know if dressing gets saturated with water . DIET  Day of Surgery: Clear liquids until no nausea or vomiting; then light, bland diet (Baked chicken, plain rice, grits, scrambled egg, toast). Nothing Greasy, fried or spicy today  Advance to regular diet on second day, unless your doctor orders otherwise. PAIN  Take pain medications as directed by your doctor. Call your doctor if pain is NOT relieved by medication. PAIN MED SIDE EFFECTS  Constipation: Lots of fluids, try prune juice, then OTC stool softeners if necessary  Nausea: Take medication with food. DRESSING CARE Keep dry and in place until follow up appointment    CALL YOUR DOCTOR IF YOU HAVE  Excessive bleeding that does not stop after holding mild pressure over the area. Temperature of 101 degrees or above. Redness, excessive swelling or bruising, and/or green or yellow, smelly discharge from incision. Loss of sensation - cold, white or blue toes. AFTER ANESTHESIA  For the first 24 hours and while taking narcotics for pain: DO NOT Drive, Drink Alcoholic beverages, or make important Decisions. Be aware of dizziness following anesthesia and while taking pain medication. Preventing Infection at Home  We care about preventing infection and avoiding the spread of germs - not only when you are in the hospital but also when you return home. When you return home from the hospital, its important to take the following steps to help prevent infection and avoid spreading germs that could infect you and others. Ask everyone in your home to follow these guidelines, too. Clean Your Hands  · Clean your hands whenever your hands are visibly dirty, before you eat, before or after touching your mouth, nose or eyes, and before preparing food.  Clean them after contact with body fluids, using the restroom, touching animals or changing diapers. · When washing hands, wet them with warm water and work up a lather. Rub hands for at least 15 seconds, then rinse them and pat them dry with a clean towel or paper towel. · When using hand sanitizers, it should take about 15 seconds to rub your hands dry. If not, you probably didnt apply enough . Cover Your Sneeze or Cough  Germs are released into the air whenever you sneeze or cough. To prevent the spread of infection:  · Turn away from other people before coughing or sneezing. · Cover your mouth or nose with a tissue when you cough or sneeze. Put the tissue in the trash. · If you dont have a tissue, cough or sneeze into your upper sleeve, not your hands. · Always clean your hands after coughing or sneezing. Care for Wounds  Your skin is your bodys first line of defense against germs, but an open wound leaves an easy way for germs to enter your body. To prevent infection:  · Clean your hands before and after changing wound dressings, and wear gloves to change dressings if recommended by your doctor. · Take special care with IV lines or other devices inserted into the body. If you must touch them, clean your hands first.  · Follow any specific instructions from your doctor to care for your wounds. Contact your doctor if you experience any signs of infection, such as fever or increased redness at the surgical or wound site. Keep a Clean Home  · Clean or wipe commonly touched hard surfaces like door handles, sinks, tabletops, phones and TV remotes. · Use products labeled disinfectant to kill harmful bacteria and viruses. · Use a clean cloth or paper towel to clean and dry surfaces. Wiping surfaces with a dirty dishcloth, sponge or towel will only spread germs. · Never share toothbrushes, gates, drinking glasses, utensils, razor blades, face cloths or bath towels to avoid spreading germs.   · Be sure that the linens that you sleep on are clean. · Keep pets away from wounds and wash your hands after touching pets, their toys or bedding. We care about you and your health. Remember, preventing infections is a team effort between you, your family, friends and health care providers. DISCHARGE SUMMARY from Nurse    PATIENT INSTRUCTIONS:    After general anesthesia or intravenous sedation, for 24 hours or while taking prescription Narcotics:  · Limit your activities  · Do not drive and operate hazardous machinery  · Do not make important personal or business decisions  · Do  not drink alcoholic beverages  · If you have not urinated within 8 hours after discharge, please contact your surgeon on call. *  Please give a list of your current medications to your Primary Care Provider. *  Please update this list whenever your medications are discontinued, doses are      changed, or new medications (including over-the-counter products) are added. *  Please carry medication information at all times in case of emergency situations. These are general instructions for a healthy lifestyle:    No smoking/ No tobacco products/ Avoid exposure to second hand smoke    Surgeon General's Warning:  Quitting smoking now greatly reduces serious risk to your health. Obesity, smoking, and sedentary lifestyle greatly increases your risk for illness    A healthy diet, regular physical exercise & weight monitoring are important for maintaining a healthy lifestyle    You may be retaining fluid if you have a history of heart failure or if you experience any of the following symptoms:  Weight gain of 3 pounds or more overnight or 5 pounds in a week, increased swelling in our hands or feet or shortness of breath while lying flat in bed. Please call your doctor as soon as you notice any of these symptoms; do not wait until your next office visit.     Recognize signs and symptoms of STROKE:    F-face looks uneven    A-arms unable to move or move unevenly    S-speech slurred or non-existent    T-time-call 911 as soon as signs and symptoms begin-DO NOT go       Back to bed or wait to see if you get better-TIME IS BRAIN.

## 2019-01-14 NOTE — BRIEF OP NOTE
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 
 
Surgical Staff: 
Circ-1: Nayely Palomares RN Scrub Tech-1: Anna Luna Event Time In Time Out Incision Start 5029 Incision Close 3502 Anesthesia: MAC Estimated Blood Loss: min Specimens: * No specimens in log * Findings: no 
Complications:no Implants: * No implants in log *

## 2019-01-14 NOTE — ANESTHESIA PREPROCEDURE EVALUATION
Anesthetic History No history of anesthetic complications Review of Systems / Medical History Patient summary reviewed and pertinent labs reviewed Pulmonary Within defined limits Neuro/Psych Within defined limits Cardiovascular Within defined limits Exercise tolerance: >4 METS 
  
GI/Hepatic/Renal 
Within defined limits Endo/Other Cancer (breast) Other Findings Physical Exam 
 
Airway Mallampati: I 
TM Distance: 4 - 6 cm Neck ROM: normal range of motion Mouth opening: Normal 
 
 Cardiovascular Regular rate and rhythm,  S1 and S2 normal,  no murmur, click, rub, or gallop Rhythm: regular Rate: normal 
 
 
 
 Dental 
No notable dental hx Pulmonary Breath sounds clear to auscultation Abdominal 
GI exam deferred Other Findings Anesthetic Plan ASA: 2 Anesthesia type: total IV anesthesia Induction: Intravenous Anesthetic plan and risks discussed with: Patient

## 2019-01-14 NOTE — PROGRESS NOTES
Spiritual Care visit. Initial Visit, Pre Surgery Consult. Attempted. Patient was already in surgery. Visit by Lauren Ambrosio M.Ed., Th.B. ,Staff

## 2019-08-15 ENCOUNTER — ANESTHESIA EVENT (OUTPATIENT)
Dept: SURGERY | Age: 39
End: 2019-08-15
Payer: COMMERCIAL

## 2019-08-15 ENCOUNTER — HOSPITAL ENCOUNTER (OUTPATIENT)
Age: 39
Setting detail: OUTPATIENT SURGERY
Discharge: HOME | End: 2019-08-15
Attending: ORTHOPAEDIC SURGERY | Admitting: ORTHOPAEDIC SURGERY
Payer: COMMERCIAL

## 2019-08-15 ENCOUNTER — ANESTHESIA (OUTPATIENT)
Dept: SURGERY | Age: 39
End: 2019-08-15
Payer: COMMERCIAL

## 2019-08-15 ENCOUNTER — APPOINTMENT (OUTPATIENT)
Dept: GENERAL RADIOLOGY | Age: 39
End: 2019-08-15
Attending: ORTHOPAEDIC SURGERY
Payer: COMMERCIAL

## 2019-08-15 VITALS
RESPIRATION RATE: 16 BRPM | HEART RATE: 70 BPM | OXYGEN SATURATION: 99 % | SYSTOLIC BLOOD PRESSURE: 106 MMHG | TEMPERATURE: 98 F | DIASTOLIC BLOOD PRESSURE: 70 MMHG

## 2019-08-15 LAB — HCG UR QL: NEGATIVE

## 2019-08-15 PROCEDURE — C1713 ANCHOR/SCREW BN/BN,TIS/BN: HCPCS | Performed by: ORTHOPAEDIC SURGERY

## 2019-08-15 PROCEDURE — 76060000032 HC ANESTHESIA 0.5 TO 1 HR: Performed by: ORTHOPAEDIC SURGERY

## 2019-08-15 PROCEDURE — 77030018836 HC SOL IRR NACL ICUM -A: Performed by: ORTHOPAEDIC SURGERY

## 2019-08-15 PROCEDURE — 74011000250 HC RX REV CODE- 250

## 2019-08-15 PROCEDURE — 74011250636 HC RX REV CODE- 250/636

## 2019-08-15 PROCEDURE — 76942 ECHO GUIDE FOR BIOPSY: CPT | Performed by: ORTHOPAEDIC SURGERY

## 2019-08-15 PROCEDURE — 76010010054 HC POST OP PAIN BLOCK: Performed by: ORTHOPAEDIC SURGERY

## 2019-08-15 PROCEDURE — 76210000020 HC REC RM PH II FIRST 0.5 HR: Performed by: ORTHOPAEDIC SURGERY

## 2019-08-15 PROCEDURE — 81025 URINE PREGNANCY TEST: CPT

## 2019-08-15 PROCEDURE — 74011250636 HC RX REV CODE- 250/636: Performed by: ORTHOPAEDIC SURGERY

## 2019-08-15 PROCEDURE — 74011250636 HC RX REV CODE- 250/636: Performed by: ANESTHESIOLOGY

## 2019-08-15 PROCEDURE — 76010000138 HC OR TIME 0.5 TO 1 HR: Performed by: ORTHOPAEDIC SURGERY

## 2019-08-15 PROCEDURE — 77030000032 HC CUF TRNQT ZIMM -B: Performed by: ORTHOPAEDIC SURGERY

## 2019-08-15 PROCEDURE — 76210000063 HC OR PH I REC FIRST 0.5 HR: Performed by: ORTHOPAEDIC SURGERY

## 2019-08-15 PROCEDURE — 77030003602 HC NDL NRV BLK BBMI -B: Performed by: NURSE ANESTHETIST, CERTIFIED REGISTERED

## 2019-08-15 PROCEDURE — 77030025281 HC SPLNT ORTHGLS 1 BSNM -B: Performed by: ORTHOPAEDIC SURGERY

## 2019-08-15 PROCEDURE — 74011250637 HC RX REV CODE- 250/637: Performed by: ANESTHESIOLOGY

## 2019-08-15 PROCEDURE — 77030002916 HC SUT ETHLN J&J -A: Performed by: ORTHOPAEDIC SURGERY

## 2019-08-15 DEVICE — TWINFIX ULTRA 4.5 MM POLY L LACTIC                                    ACID-HA SUTURE ANCHOR WITH TWO NO.2                                    ULTRABRAID SUTURES BLUE,                                    BLUE-COBRAID WITH NEEDLES
Type: IMPLANTABLE DEVICE | Site: FOOT | Status: FUNCTIONAL
Brand: TWINFIX

## 2019-08-15 RX ORDER — SODIUM CHLORIDE, SODIUM LACTATE, POTASSIUM CHLORIDE, CALCIUM CHLORIDE 600; 310; 30; 20 MG/100ML; MG/100ML; MG/100ML; MG/100ML
100 INJECTION, SOLUTION INTRAVENOUS CONTINUOUS
Status: DISCONTINUED | OUTPATIENT
Start: 2019-08-15 | End: 2019-08-15 | Stop reason: HOSPADM

## 2019-08-15 RX ORDER — MIDAZOLAM HYDROCHLORIDE 1 MG/ML
2 INJECTION, SOLUTION INTRAMUSCULAR; INTRAVENOUS
Status: DISCONTINUED | OUTPATIENT
Start: 2019-08-15 | End: 2019-08-15 | Stop reason: HOSPADM

## 2019-08-15 RX ORDER — LIDOCAINE HYDROCHLORIDE 20 MG/ML
INJECTION, SOLUTION EPIDURAL; INFILTRATION; INTRACAUDAL; PERINEURAL AS NEEDED
Status: DISCONTINUED | OUTPATIENT
Start: 2019-08-15 | End: 2019-08-15 | Stop reason: HOSPADM

## 2019-08-15 RX ORDER — SODIUM CHLORIDE 0.9 % (FLUSH) 0.9 %
5-40 SYRINGE (ML) INJECTION EVERY 8 HOURS
Status: DISCONTINUED | OUTPATIENT
Start: 2019-08-15 | End: 2019-08-15 | Stop reason: HOSPADM

## 2019-08-15 RX ORDER — PROPOFOL 10 MG/ML
INJECTION, EMULSION INTRAVENOUS
Status: DISCONTINUED | OUTPATIENT
Start: 2019-08-15 | End: 2019-08-15 | Stop reason: HOSPADM

## 2019-08-15 RX ORDER — SODIUM CHLORIDE 0.9 % (FLUSH) 0.9 %
5-40 SYRINGE (ML) INJECTION AS NEEDED
Status: DISCONTINUED | OUTPATIENT
Start: 2019-08-15 | End: 2019-08-15 | Stop reason: HOSPADM

## 2019-08-15 RX ORDER — MIDAZOLAM HYDROCHLORIDE 1 MG/ML
2 INJECTION, SOLUTION INTRAMUSCULAR; INTRAVENOUS ONCE
Status: COMPLETED | OUTPATIENT
Start: 2019-08-15 | End: 2019-08-15

## 2019-08-15 RX ORDER — PROPOFOL 10 MG/ML
INJECTION, EMULSION INTRAVENOUS AS NEEDED
Status: DISCONTINUED | OUTPATIENT
Start: 2019-08-15 | End: 2019-08-15 | Stop reason: HOSPADM

## 2019-08-15 RX ORDER — BUPIVACAINE HYDROCHLORIDE AND EPINEPHRINE 2.5; 5 MG/ML; UG/ML
INJECTION, SOLUTION EPIDURAL; INFILTRATION; INTRACAUDAL; PERINEURAL
Status: COMPLETED | OUTPATIENT
Start: 2019-08-15 | End: 2019-08-15

## 2019-08-15 RX ORDER — LIDOCAINE HYDROCHLORIDE 10 MG/ML
0.1 INJECTION INFILTRATION; PERINEURAL AS NEEDED
Status: DISCONTINUED | OUTPATIENT
Start: 2019-08-15 | End: 2019-08-15 | Stop reason: HOSPADM

## 2019-08-15 RX ORDER — SODIUM CHLORIDE 9 MG/ML
50 INJECTION, SOLUTION INTRAVENOUS CONTINUOUS
Status: DISCONTINUED | OUTPATIENT
Start: 2019-08-15 | End: 2019-08-15 | Stop reason: HOSPADM

## 2019-08-15 RX ORDER — OXYCODONE AND ACETAMINOPHEN 5; 325 MG/1; MG/1
1 TABLET ORAL AS NEEDED
Status: DISCONTINUED | OUTPATIENT
Start: 2019-08-15 | End: 2019-08-15 | Stop reason: HOSPADM

## 2019-08-15 RX ORDER — CEFAZOLIN SODIUM/WATER 2 G/20 ML
2 SYRINGE (ML) INTRAVENOUS ONCE
Status: COMPLETED | OUTPATIENT
Start: 2019-08-15 | End: 2019-08-15

## 2019-08-15 RX ORDER — VANCOMYCIN HYDROCHLORIDE
1250 ONCE
Status: COMPLETED | OUTPATIENT
Start: 2019-08-15 | End: 2019-08-15

## 2019-08-15 RX ORDER — OXYCODONE HYDROCHLORIDE 5 MG/1
5 TABLET ORAL
Status: DISCONTINUED | OUTPATIENT
Start: 2019-08-15 | End: 2019-08-15 | Stop reason: HOSPADM

## 2019-08-15 RX ORDER — BUPIVACAINE HYDROCHLORIDE AND EPINEPHRINE 5; 5 MG/ML; UG/ML
INJECTION, SOLUTION EPIDURAL; INTRACAUDAL; PERINEURAL
Status: COMPLETED | OUTPATIENT
Start: 2019-08-15 | End: 2019-08-15

## 2019-08-15 RX ORDER — FENTANYL CITRATE 50 UG/ML
25 INJECTION, SOLUTION INTRAMUSCULAR; INTRAVENOUS ONCE
Status: COMPLETED | OUTPATIENT
Start: 2019-08-15 | End: 2019-08-15

## 2019-08-15 RX ORDER — FAMOTIDINE 20 MG/1
20 TABLET, FILM COATED ORAL ONCE
Status: COMPLETED | OUTPATIENT
Start: 2019-08-15 | End: 2019-08-15

## 2019-08-15 RX ORDER — SODIUM CHLORIDE, SODIUM LACTATE, POTASSIUM CHLORIDE, CALCIUM CHLORIDE 600; 310; 30; 20 MG/100ML; MG/100ML; MG/100ML; MG/100ML
INJECTION, SOLUTION INTRAVENOUS
Status: DISCONTINUED | OUTPATIENT
Start: 2019-08-15 | End: 2019-08-15 | Stop reason: HOSPADM

## 2019-08-15 RX ORDER — DEXAMETHASONE SODIUM PHOSPHATE 4 MG/ML
INJECTION, SOLUTION INTRA-ARTICULAR; INTRALESIONAL; INTRAMUSCULAR; INTRAVENOUS; SOFT TISSUE AS NEEDED
Status: DISCONTINUED | OUTPATIENT
Start: 2019-08-15 | End: 2019-08-15 | Stop reason: HOSPADM

## 2019-08-15 RX ORDER — DIPHENHYDRAMINE HYDROCHLORIDE 50 MG/ML
12.5 INJECTION, SOLUTION INTRAMUSCULAR; INTRAVENOUS ONCE
Status: DISCONTINUED | OUTPATIENT
Start: 2019-08-15 | End: 2019-08-15 | Stop reason: HOSPADM

## 2019-08-15 RX ORDER — HYDROMORPHONE HYDROCHLORIDE 2 MG/ML
0.5 INJECTION, SOLUTION INTRAMUSCULAR; INTRAVENOUS; SUBCUTANEOUS
Status: DISCONTINUED | OUTPATIENT
Start: 2019-08-15 | End: 2019-08-15 | Stop reason: HOSPADM

## 2019-08-15 RX ORDER — LIDOCAINE HYDROCHLORIDE 20 MG/ML
INJECTION, SOLUTION EPIDURAL; INFILTRATION; INTRACAUDAL; PERINEURAL
Status: COMPLETED | OUTPATIENT
Start: 2019-08-15 | End: 2019-08-15

## 2019-08-15 RX ADMIN — VANCOMYCIN HYDROCHLORIDE 1250 MG: 10 INJECTION, POWDER, LYOPHILIZED, FOR SOLUTION INTRAVENOUS at 11:14

## 2019-08-15 RX ADMIN — PROPOFOL 20 MG: 10 INJECTION, EMULSION INTRAVENOUS at 11:26

## 2019-08-15 RX ADMIN — FENTANYL CITRATE 25 MCG: 50 INJECTION INTRAMUSCULAR; INTRAVENOUS at 10:13

## 2019-08-15 RX ADMIN — LIDOCAINE HYDROCHLORIDE 5 ML: 20 INJECTION, SOLUTION EPIDURAL; INFILTRATION; INTRACAUDAL; PERINEURAL at 10:23

## 2019-08-15 RX ADMIN — Medication 0.5 G: at 11:20

## 2019-08-15 RX ADMIN — SODIUM CHLORIDE, SODIUM LACTATE, POTASSIUM CHLORIDE, AND CALCIUM CHLORIDE 100 ML/HR: 600; 310; 30; 20 INJECTION, SOLUTION INTRAVENOUS at 08:51

## 2019-08-15 RX ADMIN — PROPOFOL 30 MG: 10 INJECTION, EMULSION INTRAVENOUS at 11:22

## 2019-08-15 RX ADMIN — PROPOFOL 30 MG: 10 INJECTION, EMULSION INTRAVENOUS at 10:13

## 2019-08-15 RX ADMIN — PROPOFOL 140 MCG/KG/MIN: 10 INJECTION, EMULSION INTRAVENOUS at 11:24

## 2019-08-15 RX ADMIN — BUPIVACAINE HYDROCHLORIDE AND EPINEPHRINE 5 ML: 5; 5 INJECTION, SOLUTION EPIDURAL; INTRACAUDAL; PERINEURAL at 10:19

## 2019-08-15 RX ADMIN — BUPIVACAINE HYDROCHLORIDE AND EPINEPHRINE 15 ML: 2.5; 5 INJECTION, SOLUTION EPIDURAL; INFILTRATION; INTRACAUDAL; PERINEURAL at 10:23

## 2019-08-15 RX ADMIN — BUPIVACAINE HYDROCHLORIDE AND EPINEPHRINE 5 ML: 2.5; 5 INJECTION, SOLUTION EPIDURAL; INFILTRATION; INTRACAUDAL; PERINEURAL at 10:19

## 2019-08-15 RX ADMIN — Medication 0.5 G: at 11:24

## 2019-08-15 RX ADMIN — Medication 0.5 G: at 11:22

## 2019-08-15 RX ADMIN — DEXAMETHASONE SODIUM PHOSPHATE 4 MG: 4 INJECTION, SOLUTION INTRA-ARTICULAR; INTRALESIONAL; INTRAMUSCULAR; INTRAVENOUS; SOFT TISSUE at 11:24

## 2019-08-15 RX ADMIN — FAMOTIDINE 20 MG: 20 TABLET ORAL at 08:51

## 2019-08-15 RX ADMIN — PROPOFOL 10 MG: 10 INJECTION, EMULSION INTRAVENOUS at 11:29

## 2019-08-15 RX ADMIN — Medication 0.5 G: at 11:26

## 2019-08-15 RX ADMIN — LIDOCAINE HYDROCHLORIDE 40 MG: 20 INJECTION, SOLUTION EPIDURAL; INFILTRATION; INTRACAUDAL; PERINEURAL at 11:22

## 2019-08-15 RX ADMIN — PROPOFOL 10 MG: 10 INJECTION, EMULSION INTRAVENOUS at 11:32

## 2019-08-15 RX ADMIN — SODIUM CHLORIDE, SODIUM LACTATE, POTASSIUM CHLORIDE, CALCIUM CHLORIDE: 600; 310; 30; 20 INJECTION, SOLUTION INTRAVENOUS at 11:16

## 2019-08-15 RX ADMIN — PROPOFOL 20 MG: 10 INJECTION, EMULSION INTRAVENOUS at 11:24

## 2019-08-15 RX ADMIN — MIDAZOLAM HYDROCHLORIDE 2 MG: 2 INJECTION, SOLUTION INTRAMUSCULAR; INTRAVENOUS at 10:13

## 2019-08-15 RX ADMIN — BUPIVACAINE HYDROCHLORIDE AND EPINEPHRINE 15 ML: 5; 5 INJECTION, SOLUTION EPIDURAL; INTRACAUDAL; PERINEURAL at 10:23

## 2019-08-15 NOTE — ANESTHESIA PREPROCEDURE EVALUATION
Relevant Problems   No relevant active problems       Anesthetic History   No history of anesthetic complications            Review of Systems / Medical History  Patient summary reviewed and pertinent labs reviewed    Pulmonary  Within defined limits                 Neuro/Psych   Within defined limits           Cardiovascular                  Exercise tolerance: >4 METS     GI/Hepatic/Renal  Within defined limits              Endo/Other  Within defined limits      Cancer (breast)     Other Findings              Physical Exam    Airway  Mallampati: II  TM Distance: 4 - 6 cm  Neck ROM: normal range of motion        Cardiovascular  Regular rate and rhythm,  S1 and S2 normal,  no murmur, click, rub, or gallop  Rhythm: regular  Rate: normal         Dental  No notable dental hx       Pulmonary  Breath sounds clear to auscultation               Abdominal  Abdominal exam normal       Other Findings            Anesthetic Plan    ASA: 2  Anesthesia type: total IV anesthesia      Post-op pain plan if not by surgeon: peripheral nerve block single    Induction: Intravenous  Anesthetic plan and risks discussed with: Patient

## 2019-08-15 NOTE — ANESTHESIA PROCEDURE NOTES
Peripheral Block    Start time: 8/15/2019 10:17 AM  End time: 8/15/2019 10:19 AM  Performed by: William Joy MD  Authorized by: William Joy MD       Pre-procedure: Indications: at surgeon's request and post-op pain management    Preanesthetic Checklist: patient identified, risks and benefits discussed, site marked, timeout performed, anesthesia consent given and patient being monitored    Timeout Time: 10:17          Block Type:   Block Type:   Adductor canal  Laterality:  Right  Monitoring:  Standard ASA monitoring, continuous pulse ox, frequent vital sign checks, heart rate, oxygen and responsive to questions  Injection Technique:  Single shot  Procedures: ultrasound guided and nerve stimulator    Patient Position: left lateral decubitus  Prep: chlorhexidine    Location:  Mid thigh  Needle Type:  Stimuplex  Needle Gauge:  22 G  Needle Localization:  Nerve stimulator and ultrasound guidance  Motor Response: minimal motor response >0.4 mA      Assessment:  Number of attempts:  1  Injection Assessment:  Local visualized surrounding nerve on ultrasound, negative aspiration for blood, no paresthesia, no intravascular symptoms, ultrasound image on chart and incremental injection every 5 mL  Patient tolerance:  Patient tolerated the procedure well with no immediate complications

## 2019-08-15 NOTE — ANESTHESIA PROCEDURE NOTES
Peripheral Block    Start time: 8/15/2019 10:13 AM  End time: 8/15/2019 1:07 PM  Performed by: Lizandro Maxwell MD  Authorized by: Lizandro Maxwell MD       Pre-procedure:    Indications: at surgeon's request and post-op pain management    Preanesthetic Checklist: patient identified, risks and benefits discussed, site marked, timeout performed, anesthesia consent given and patient being monitored    Timeout Time: 10:13          Block Type:   Block Type:  Popliteal  Laterality:  Right  Monitoring:  Standard ASA monitoring, continuous pulse ox, frequent vital sign checks, heart rate and oxygen  Injection Technique:  Single shot  Procedures: ultrasound guided and nerve stimulator    Patient Position: left lateral decubitus  Prep: chlorhexidine    Location:  Mid thigh  Needle Type:  Stimuplex  Needle Gauge:  22 G  Needle Localization:  Nerve stimulator and ultrasound guidance  Motor Response: minimal motor response >0.4 mA      Assessment:  Number of attempts:  1  Injection Assessment:  Local visualized surrounding nerve on ultrasound, negative aspiration for blood, no paresthesia, no intravascular symptoms, ultrasound image on chart and incremental injection every 5 mL  Patient tolerance:  Patient tolerated the procedure well with no immediate complications

## 2019-08-15 NOTE — DISCHARGE INSTRUCTIONS
INSTRUCTIONS FOLLOWING FOOT SURGERY    Contact Zully Landis at [address] [facility] office at [phone] to obtain knee scooter information. ACTIVITY  Get out of bed frequently and move legs to reduce risk of blood clots   Take one ASPIRIN 325 mg tablet each day to reduce risk of blood clots if tolerated  Elevate foot (feet) for 48 hours. No ice. Use crutches/scooter  as directed by your doctor. No weight bearing on operative foot at any time. DIET  Avoid greasy and spicy food today to reduce nausea  Clear liquids until no nausea or vomiting; then light diet for the first day. Advance to regular diet on second day, unless your doctor orders otherwise. PAIN  Begin taking pain pills when sensation returns or at bedtime even if still numb  Take pain medications as directed by your doctor. Call your doctor if pain is NOT relieved by medication. DO NOT take aspirin or blood thinners until directed by your doctor. Take with food to reduce nausea  May cause constipation Use stool softener    DRESSING CARE  Keep clean and dry until follow up appointment. Patient Education        Wearing a Fiberglass Cast: Care Instructions  Your Care Instructions    A cast protects a broken bone or other injury while it heals. Most casts are made of fiberglass. After a cast is put on, you can't remove it yourself. Your doctor or a technician will take it off. Follow-up care is a key part of your treatment and safety. Be sure to make and go to all appointments, and call your doctor if you are having problems. It's also a good idea to know your test results and keep a list of the medicines you take. How can you care for yourself at home? General care  · Follow your doctor's instructions for when you can start using the limb that has the cast.   · Prop up the injured  leg on a pillow anytime you sit or lie down during the first 3 days. Try to keep it above the level of your heart. This will help reduce swelling.   · Be safe with medicines. Read and follow all instructions on the label. ? If the doctor gave you a prescription medicine for pain, take it as prescribed. ? If you are not taking a prescription pain medicine, ask your doctor if you can take an over-the-counter medicine. · Do exercises as instructed by your doctor or physical therapist. These exercises will help keep your muscles strong and your joints flexible while you heal.  · Wiggle your toes on the injured  leg often. This helps reduce swelling and stiffness. Water and your cast  · Try to keep your cast as dry as you can. The fiberglass part of your cast can get wet. But getting the inside wet can cause problems. · Use a bag or tape a sheet of plastic to cover your cast when you take a shower or bath or when you have any other contact with water. (Don't take a bath unless you can keep the cast out of the water.) Moisture can collect under the cast and cause skin irritation and itching. It can make infection more likely if you have had surgery or have a wound under the cast.  · If you have a water-resistant cast, ask your doctor how often it can get wet and how to take care of it. Cast and skin care  · Try blowing cool air from a hair dryer or fan into the cast to help relieve itching. Never stick items under your cast to scratch the skin. · Don't use oils or lotions near your cast. If the skin gets red or irritated around the edge of the cast, you may pad the edges with a soft material or use tape to cover them. When should you call for help? Call your doctor now or seek immediate medical care if:    · You have increased or severe pain.     · You feel a warm or painful spot under the cast.     · You have problems with your cast. For example:  ? The skin under the cast burns or stings. ? The cast feels too tight or too loose. ? There is a lot of swelling near the cast. (Some swelling is normal.)  ? You have a new fever. ?  There is drainage or a bad smell coming from the cast.     · Your foot or hand is cool or pale or changes color.     · You have trouble moving your fingers or toes.     · You have symptoms of a blood clot in your arm or leg (called a deep vein thrombosis). These may include:  ? Pain in the arm, calf, back of the knee, thigh, or groin. ? Redness and swelling in the arm, leg, or groin.    Watch closely for changes in your health, and be sure to contact your doctor if:    · The cast is breaking apart.     · You are not getting better as expected. Where can you learn more? Go to http://aydenMuzydarrick.info/. Enter 454 [id number] in the search box to learn more about \"Wearing a Fiberglass Cast: Care Instructions. \"  Current as of: September 20, 2018  Content Version: 12.1  © 5066-4768 [employer]. Care instructions adapted under license by [employer] (which disclaims liability or warranty for this information). If you have questions about a medical condition or this instruction, always ask your healthcare professional. [place] [address] any warranty or liability for your use of this information. FOLLOW-UP PHONE CALLS  Calls will be made by nursing staff. If you have any problems, call your doctor as needed. CALL YOUR DOCTOR IF YOU HAVE  Excessive bleeding that does not stop after holding mild pressure over the area. Temperature of 101 degrees or above. Redness, excessive swelling or bruising, and/or green or yellow, smelly discharge from incision. Loss of sensation - cold, white or blue toes. AFTER ANESTHESIA  For the first 24 hours and while taking narcotics for pain: DO NOT Drive, Drink Alcoholic beverages, or make important Decisions. Be aware of dizziness following anesthesia and while taking pain medication.     OTHER INSTRUCTIONS: Patient Education        Learning About How to Use Crutches  Your Care Instructions  Crutches can help you walk when you have an injured hip, leg, knee, ankle, or foot. Your doctor will tell you how much weight--if any--you can put on your leg. Be sure your crutches fit you. When you stand up in your normal posture, there should be space for two or three fingers between the top of the crutch and your armpit. When you let your hands hang down, the hand  should be at your wrists. When you put your hands on the hand , your elbows should be slightly bent. To stay safe when using crutches:  · Look straight ahead, not down at your feet. · Clear away small rugs, cords, or anything else that could cause you to trip, slip, or fall. · Be very careful around pets and small children. They can get in your path when you least expect it. · Be sure the rubber tips on your crutches are clean and in good condition to help prevent slipping. · Avoid slick conditions, such as wet floors and snowy or icy driveways. In bad weather, be especially careful on curbs and steps. How to use crutches  Getting ready to walk    1. Bend your elbows slightly. Press the padded top parts of the crutches against your sides, under your armpits. 2. If you have been told not to put any weight on your injured leg, keep that leg bent and off the ground. How to walk with crutches when you can put weight on the injured leg    1. Put both crutches about 12 inches in front of you. The crutches and your feet should form a triangle. Hold the crutches close enough to your body so you can push straight down on them, but leave room between the crutches for your body to pass through. Don't lean forward to reach farther. 2. Put your weight on the handgrips, not on the pads under your arms. Constant pressure against your underarms can cause numbness. 3. Move your weak or injured leg forward so it's almost even with the crutches. 4. Bring your good leg up, so it's even with your weak or injured leg. 5. Move your crutches about 12 inches in front of you, and start the next step.     Sitting down    1. To sit, back up to the chair. Use one hand to hold both crutches by the handgrips, beside your injured leg. With the other hand, hold onto the seat and slowly lower yourself onto the chair. 2. Lay the crutches on the ground near your chair. If you prop them up, they may fall over. Getting up from a chair    1. To get up from a chair,  the crutches and put them in one hand beside your injured leg. 2. Put your weight on the handgrips of the crutches and on your strong leg to stand up. How to go up and down stairs using crutches    1. Stand near the edge of the stairs. 2. Go up or down the stairs. ? If you are going up, step up with your stronger leg. Then bring the crutches and your weak or injured leg to the upper step. ? If you are going down, put your crutches and your weak or injured leg on the lower step. Then bring your stronger leg down to the lower step. Remember \"up with the good, and down with the bad\" to help you lead with the correct leg. Crutches: How to go up and down stairs with handrails    1. Stand near the edge of the stairs. 2. Put both crutches under the arm opposite the handrail. 3. Use the hand opposite the handrail to hold both crutches by the handgrips. 4. Hold onto the handrail as you go up or down. 5. Go up or down the stairs. ? If you are going up, step up with your stronger leg. Then bring the crutches and your weak or injured leg to the upper step. ? If you are going down, put your crutches and your weak or injured leg on the lower step. Then bring your stronger leg down to the lower step. Remember \"up with the good, down with the bad\" to help you lead with the correct leg. Follow-up care is a key part of your treatment and safety. Be sure to make and go to all appointments, and call your doctor if you are having problems. It's also a good idea to know your test results and keep a list of the medicines you take. Where can you learn more?   Go to http://ayden-darrick.info/. Enter M950 in the search box to learn more about \"Learning About How to Use Crutches. \"  Current as of: September 20, 2018  Content Version: 12.1  © 3402-4772 [employer]. Care instructions adapted under license by [employer] (which disclaims liability or warranty for this information). If you have questions about a medical condition or this instruction, always ask your healthcare professional. [place] [address] any warranty or liability for your use of this information. APPOINTMENT DATE/TIME: keep scheduled appointment     YOUR DOCTOR'S PHONE NUMBER [phone]      DISCHARGE SUMMARY from Nurse    PATIENT INSTRUCTIONS:    After general anesthesia or intravenous sedation, for 24 hours or while taking prescription Narcotics:  · Limit your activities  · Do not drive and operate hazardous machinery  · Do not make important personal or business decisions  · Do  not drink alcoholic beverages  · If you have not urinated within 8 hours after discharge, please contact your surgeon on call. *  Please give a list of your current medications to your Primary Care Provider. *  Please update this list whenever your medications are discontinued, doses are      changed, or new medications (including over-the-counter products) are added. *  Please carry medication information at all times in case of emergency situations. These are general instructions for a healthy lifestyle:    No smoking/ No tobacco products/ Avoid exposure to second hand smoke    Surgeon General's Warning:  Quitting smoking now greatly reduces serious risk to your health.     Obesity, smoking, and sedentary lifestyle greatly increases your risk for illness    A healthy diet, regular physical exercise & weight monitoring are important for maintaining a healthy lifestyle    You may be retaining fluid if you have a history of heart failure or if you experience any of the following symptoms:  Weight gain of 3 pounds or more overnight or 5 pounds in a week, increased swelling in our hands or feet or shortness of breath while lying flat in bed. Please call your doctor as soon as you notice any of these symptoms; do not wait until your next office visit. Recognize signs and symptoms of STROKE:    F-face looks uneven    A-arms unable to move or move unevenly    S-speech slurred or non-existent    T-time-call 911 as soon as signs and symptoms begin-DO NOT go       Back to bed or wait to see if you get better-TIME IS BRAIN.

## 2019-08-15 NOTE — ANESTHESIA POSTPROCEDURE EVALUATION
Procedure(s):  RIGHT KIDNER PROCEDURE CHOICE ANES. total IV anesthesia, regional    Anesthesia Post Evaluation      Multimodal analgesia: multimodal analgesia used between 6 hours prior to anesthesia start to PACU discharge  Patient location during evaluation: bedside  Patient participation: complete - patient participated  Level of consciousness: awake  Pain management: adequate  Airway patency: patent  Anesthetic complications: no  Cardiovascular status: acceptable and stable  Respiratory status: acceptable and room air  Hydration status: acceptable        Vitals Value Taken Time   /57 8/15/2019 11:59 AM   Temp     Pulse 67 8/15/2019 12:12 PM   Resp 16 8/15/2019 11:59 AM   SpO2 98 % 8/15/2019 12:12 PM   Vitals shown include unvalidated device data.

## 2019-08-15 NOTE — BRIEF OP NOTE
BRIEF OPERATIVE NOTE    Date of Procedure: 8/15/2019   Preoperative Diagnosis: Posterior tibial tendonitis of right leg [M76.821]  Postoperative Diagnosis: Posterior tibial tendonitis of right leg [M76.821]    Procedure(s):  RIGHT [employer] PROCEDURE CHOICE ANES  Surgeon(s) and Role:     * Darby Garrett MD - Primary         Surgical Assistant: no    Surgical Staff:  Circ-1: Jerry Mcguire RN  Radiology Technician: Marilyn Mora  Scrub Tech-1: Chana Wyatt  Event Time In Time Out   Incision Start 1129    Incision Close 1148      Anesthesia: General   Estimated Blood Loss: min  Specimens: * No specimens in log *   Findings: no  Complications: no  Implants:   Implant Name Type Inv.  Item Serial No.  Lot No. LRB No. Used Action   ANCHOR SUT TWNFX 4.5 ULTR W/2 --  - [id number]  ANCHOR SUT TWNFX 4.5 ULTR W/2 --   Benito Parker [employer] [facility] [id number] Right 1 Implanted

## 2019-08-16 NOTE — OP NOTES
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    PROCEDURE PERFORMED:  Right Kidner procedure with posterior tibial tendon advancement and removal of accessory navicular. [address] [place] III, MD        ANESTHESIA:  Popliteal block with monitored anesthesia care. ESTIMATED BLOOD LOSS:  Minimal.    TOURNIQUET TIME:  15 minutes at 250 mmHg. ANTIBIOTIC PROPHYLAXIS:  Ancef given prior to the procedure. INDICATION:  The patient is a 28-year-old white female status post previous right midfoot ganglion cyst excision with recurrence and a painful accessory navicular, who has failed conservative therapy and desires surgical treatment. Risks and benefits of the procedure including, but not limited to risk of anesthetic complications including myocardial infarction, stroke, and death; and surgical complications including damage to nerves and blood vessels, risk of infection, incomplete pain relief, risk of recurrence, and need for additional surgery were discussed with the patient. She understands the risks and wishes to proceed with surgery at this time. DETAILS OF PROCEDURE:  The patient's operative site was marked with indelible ink in the preop holding area. A block was placed by Department of Anesthesia. The patient was brought to the operating room and placed supine. After preop surgical time-out, the right lower extremity was identified as the surgical site, prepped and draped in a standard sterile fashion with ChloraPrep solution. Incision was made along the posterior tibial tendon sheath which was opened at that time.   There was a ganglion cyst which was identified and excised at that point. The Bovie electrocautery was used to seal the source. At that point, the posterior tibial tendon sheath was then opened. The posterior tibial tendon was removed from the navicular at its insertion. The accessory navicular was then excised using rongeur at that time and a Smith and Nephew anchor was then placed under fluoroscopic guidance to imbricate and tighten the posterior tibial tendon back in the mid navicular. The wounds were irrigated and closed using Monocryl and nylon sutures. A sterile dressing was then applied followed by a well-padded posterior splint. Anesthesia was discontinued. The patient was transferred back to recovery room bed and taken to the recovery room in satisfactory condition. She appeared to tolerate the procedure well. There were no apparent surgical or anesthetic complications. All needle and sponge count was correct.         MD ARMANI Crawley/[email or web address]  D:  08/15/2019 16:22  T:  08/16/2019 0:01  JOB #:  [id number]

## 2020-06-29 ENCOUNTER — HOSPITAL ENCOUNTER (OUTPATIENT)
Dept: SURGERY | Age: 40
Discharge: HOME | End: 2020-06-29

## 2020-07-01 VITALS — WEIGHT: 170 LBS | HEIGHT: 64 IN | BODY MASS INDEX: 29.02 KG/M2

## 2020-07-01 NOTE — PERIOP NOTES
Patient verified name and . Order for consent NOT found in EHR ; patient verifies procedure. Type 1B surgery, phone assessment complete. Orders NOT received. Labs per surgeon: unknown; no orders received in EHR at time of assessment. Labs per anesthesia protocol: none needed. Patient answered medical/surgical history questions at their best of ability. All prior to admission medications documented in [facility]. Patient instructed to take the following medications the day of surgery according to anesthesia guidelines with a small sip of water: none. Hold all vitamins 7 days prior to surgery and NSAIDS 5 days prior to surgery. Prescription meds to hold: none. Patient instructed on the following:  >A negative Covid swab result is required to proceed with surgery; the swab will be collected 7 days prior to surgery at the [facility] at [address]. The patient will be contacted by the Covid swab team for an appointment date and time. For questions or concerns the patient should call [phone]. The clinic is closed from 12- for lunch and on weekends. Appointment date/time on 2020 per patient. > 1 visitor allowed at this time. >Arrive at [facility], time of arrival to be called the day before by 1700  >NPO after midnight including gum, mints, and ice chips  >Responsible adult must drive patient to the hospital, stay during surgery, and patient will need supervision 24 hours after anesthesia  >Use Hibiclens in shower the night before surgery and on the morning of surgery  >All piercings must be removed prior to arrival.    >Leave all valuables (money and jewelry) at home but bring insurance card and ID on DOS. >Do not wear make-up, nail polish, lotions, cologne, perfumes, powders, or oil on skin. Patient teach back successful and patient demonstrates knowledge of instruction.

## 2020-07-02 RX ORDER — SODIUM CHLORIDE 0.9 % (FLUSH) 0.9 %
5-40 SYRINGE (ML) INJECTION AS NEEDED
Status: CANCELLED | OUTPATIENT
Start: 2020-07-02

## 2020-07-02 RX ORDER — SODIUM CHLORIDE 0.9 % (FLUSH) 0.9 %
5-40 SYRINGE (ML) INJECTION EVERY 8 HOURS
Status: CANCELLED | OUTPATIENT
Start: 2020-07-02

## 2020-07-02 NOTE — H&P
2020  Benjamin Delgado, 47, F   [facility] Plastic Surgery   Pre-Operative History and Physical  Patient Information  :  Patient Information-  Benjamin Delgado   : 1980   Scheduled Services for : Mckenzie Aguilar   Appt Purpose: Breast Reconstruction - Revision - Bilateral, Fat Grafting Appt Notes: Surgery 20   Hospital PreOp by Phone   Collect 1300   Referral Source: Doctor   Occupation: [employer]: [facility] Plan: [facility]   Surgery date: 2020 Breast Reconstruction - Revision - Bilateral, Fat Grafting Dr Gibran MANNING   Medications / Allergies  :  List any allergies: Allergy Reaction   1 Loratab Severe itching   2 zofran    List any current medications (Please include over-the-counter medications)      Drug Dosage   1. doxycycline 10mg   Non-steroidal anti-inflammatory drugs (ex. Motrin, Aleve)  Patient denies regular use of NSAID's. Do you take vitamins/minerals? No       Height / Weight / BMI:  Height/Weight/BMI  Her height is 5ft 4in and weight is 170lbs. BMI is 29.18. Social / Family History:  ~MUS2 - Smoking Status  Never smoker. Social History  Patient denies using illegal drugs, denies high risk factors and admits alochol use socially.    Family History  Cancer Sister Thyroid   Diabetes Paternal Grandmother   High Blood Pressure Mother   Lung Disease Maternal Grandmother COPD  Vital Signs:  ~MUS2 - Blood Pressure  103/65   Vitals  Pulse 67 and RAPS02 99%   Pre-Operative Diagnosis:  Diagnosis - Text  Diagnosis: History breast cancer   Planned Procedure:  Planned Procedure  Bilateral Breast Scar Revision, Bilateral Fat Grafting and Left Capsulorrhaphy   History of Present Illness  Past Medical / Surgical History:  Past Medical History  Breast Cancer <Details>  Review of Systems (check all current symptoms)  arthritis/joint disformity - no asthma - no chest pain - no heart attack - no high blood pressure - no inflamation of veins - no murmur - no pacemaker - no phlebitis - no convulsions - no epilepsy or seizures - no fainting - no fever or chills - no heart disease - no nausea, vomiting, diarrheah when taking antibiotics - no unexpected weight loss or weight gain - no chronic cough - no hearing loss *YES* morning cough - no sinus disorder - no diabetes - no excessive thirst/hunger - no frequent urination - no thyroid disorder - no abdominal pain - no chronic diarrhea or constipation - no gastro-intestinal problems - no irregular heartbeat - no stomach absorptive disorder - no ulcers - no bladder problems - no currently breast feeding - no currently pregnant - no frequency/burning during urination - no kidney problems - no yeast infection - no anemia - no bleed easily - no blood clots - no blood transfusion - no arthralgia - no artificial joints - no limited motion in joints - no muscle weakness - no paralysis - no stroke - no numbness or tingling - no headache - no migraines - no bronchitis - no emphysema - no shortness of breath - no tuberculosis - no wheezing - no psychiatric treatment - no recent crisis - no history of psychiatric hospitilization - no   Past Surgeries  Right Knee Surgery 2004   Uterine polyp removed    Cyst removed from right foot 2013    2012   bilateral mastectomies with immediate breast reconstruction with tissues expanders   exchange tissue expanders for permanent implants  Anesthesia History  - No past anesthesia problems. Ethnic Origin  Diagnosis Codes   :  Diagnosis - Breast  History of CA, Personal   Diagnosis Codes  Breast   Pre-Operative Physical Exam:  HEENT  HEENT: Deferred. Cardiac Exam II  Cardiac Exam: WNL. Pulmonary Exam  Pulmonary Exam: WNL. Abdominal Exam  Abdominal Exam: Deferred. Extremity & Neuro Exam  Extremity & Neuro Exam: WNL.    Pertinent System/Surgical Site Exam  Breast   Pre-Operative Checklist / Risks & Benefits:  Pre-op Checklist  1. Patient seen preoperatively. 2. All patient questions answered. 3. Risks and benefits (including alternative treatments) reviewed. 4. Surgery time and date reviewed. 5. Proper prescriptions given. 6. Photographs taken  7. Complications both expected and unexpected discussed  8. Patient verbalized understanding of the outcome possibilites inherent with this procedure  Pre-Op Risks and Benefits  bleeding, scar, infection, less than aesthetic result, need for additional surgery, bruising, swelling, hypertrophic scar, wound breakdown, asymmetry, numbness, contour irregularities, hematoma, seroma, DVT, partial skin flap loss, under or over resection, change in nipple sensitivity-temporary or permanent, possible need for drains, malposition of implant and implant failure   Surgical Risks Discussed  Pre-Operative Orders:  Planned Procedure  Bilateral breast reconstruction with placement of tissue expanders.    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

## 2020-07-05 ENCOUNTER — ANESTHESIA EVENT (OUTPATIENT)
Dept: SURGERY | Age: 40
End: 2020-07-05
Payer: COMMERCIAL

## 2020-07-06 ENCOUNTER — HOSPITAL ENCOUNTER (OUTPATIENT)
Age: 40
Setting detail: OUTPATIENT SURGERY
Discharge: HOME | End: 2020-07-06
Attending: PLASTIC SURGERY | Admitting: PLASTIC SURGERY
Payer: COMMERCIAL

## 2020-07-06 ENCOUNTER — ANESTHESIA (OUTPATIENT)
Dept: SURGERY | Age: 40
End: 2020-07-06
Payer: COMMERCIAL

## 2020-07-06 VITALS
OXYGEN SATURATION: 96 % | HEART RATE: 64 BPM | RESPIRATION RATE: 15 BRPM | WEIGHT: 171 LBS | BODY MASS INDEX: 29.19 KG/M2 | DIASTOLIC BLOOD PRESSURE: 67 MMHG | TEMPERATURE: 98.3 F | SYSTOLIC BLOOD PRESSURE: 97 MMHG | HEIGHT: 64 IN

## 2020-07-06 LAB — HCG UR QL: NEGATIVE

## 2020-07-06 PROCEDURE — 74011000250 HC RX REV CODE- 250: Performed by: PLASTIC SURGERY

## 2020-07-06 PROCEDURE — 77030008536 HC TBNG LIPO SUC GRAM -A: Performed by: PLASTIC SURGERY

## 2020-07-06 PROCEDURE — 74011250636 HC RX REV CODE- 250/636: Performed by: ANESTHESIOLOGY

## 2020-07-06 PROCEDURE — 74011250636 HC RX REV CODE- 250/636: Performed by: NURSE ANESTHETIST, CERTIFIED REGISTERED

## 2020-07-06 PROCEDURE — 76210000020 HC REC RM PH II FIRST 0.5 HR: Performed by: PLASTIC SURGERY

## 2020-07-06 PROCEDURE — 76210000006 HC OR PH I REC 0.5 TO 1 HR: Performed by: PLASTIC SURGERY

## 2020-07-06 PROCEDURE — 77030002916 HC SUT ETHLN J&J -A: Performed by: PLASTIC SURGERY

## 2020-07-06 PROCEDURE — 77030040830 HC CATH URETH FOL MDII -A: Performed by: PLASTIC SURGERY

## 2020-07-06 PROCEDURE — 77030011265 HC ELECTRD BLD HEX COVD -A: Performed by: PLASTIC SURGERY

## 2020-07-06 PROCEDURE — 74011000250 HC RX REV CODE- 250: Performed by: NURSE ANESTHETIST, CERTIFIED REGISTERED

## 2020-07-06 PROCEDURE — 77030027515 HC TBNG LIPO SUC MDCO -B: Performed by: PLASTIC SURGERY

## 2020-07-06 PROCEDURE — 76010000171 HC OR TIME 2 TO 2.5 HR INTENSV-TIER 1: Performed by: PLASTIC SURGERY

## 2020-07-06 PROCEDURE — 77030031139 HC SUT VCRL2 J&J -A: Performed by: PLASTIC SURGERY

## 2020-07-06 PROCEDURE — 77030026227 HC FUNL KELLR 2 PCH ALGN -C: Performed by: PLASTIC SURGERY

## 2020-07-06 PROCEDURE — 76060000035 HC ANESTHESIA 2 TO 2.5 HR: Performed by: PLASTIC SURGERY

## 2020-07-06 PROCEDURE — 74011250636 HC RX REV CODE- 250/636: Performed by: PLASTIC SURGERY

## 2020-07-06 PROCEDURE — 77030036554: Performed by: PLASTIC SURGERY

## 2020-07-06 PROCEDURE — 74011250637 HC RX REV CODE- 250/637: Performed by: ANESTHESIOLOGY

## 2020-07-06 PROCEDURE — 77030008462 HC STPLR SKN PROX J&J -A: Performed by: PLASTIC SURGERY

## 2020-07-06 PROCEDURE — 77030005325: Performed by: PLASTIC SURGERY

## 2020-07-06 PROCEDURE — 77030018836 HC SOL IRR NACL ICUM -A: Performed by: PLASTIC SURGERY

## 2020-07-06 PROCEDURE — 77030011266 HC ELECTRD BLD INSL COVD -A: Performed by: PLASTIC SURGERY

## 2020-07-06 PROCEDURE — 77030040361 HC SLV COMPR DVT MDII -B: Performed by: PLASTIC SURGERY

## 2020-07-06 PROCEDURE — 81025 URINE PREGNANCY TEST: CPT

## 2020-07-06 PROCEDURE — 77030010509 HC AIRWY LMA MSK TELE -A: Performed by: ANESTHESIOLOGY

## 2020-07-06 PROCEDURE — 77030011825 HC SUPP SURG PSTOP S2SG -B: Performed by: PLASTIC SURGERY

## 2020-07-06 PROCEDURE — 77030002933 HC SUT MCRYL J&J -A: Performed by: PLASTIC SURGERY

## 2020-07-06 PROCEDURE — 77030035548 HC DEV TISS COLL DEL SYS REVOLV DISP ALGN -F: Performed by: PLASTIC SURGERY

## 2020-07-06 RX ORDER — SODIUM CHLORIDE, SODIUM LACTATE, POTASSIUM CHLORIDE, CALCIUM CHLORIDE 600; 310; 30; 20 MG/100ML; MG/100ML; MG/100ML; MG/100ML
75 INJECTION, SOLUTION INTRAVENOUS CONTINUOUS
Status: DISCONTINUED | OUTPATIENT
Start: 2020-07-06 | End: 2020-07-06 | Stop reason: HOSPADM

## 2020-07-06 RX ORDER — SODIUM CHLORIDE 0.9 % (FLUSH) 0.9 %
5-40 SYRINGE (ML) INJECTION EVERY 8 HOURS
Status: DISCONTINUED | OUTPATIENT
Start: 2020-07-06 | End: 2020-07-06 | Stop reason: HOSPADM

## 2020-07-06 RX ORDER — OXYCODONE HYDROCHLORIDE 5 MG/1
5 TABLET ORAL
Status: COMPLETED | OUTPATIENT
Start: 2020-07-06 | End: 2020-07-06

## 2020-07-06 RX ORDER — FENTANYL CITRATE 50 UG/ML
INJECTION, SOLUTION INTRAMUSCULAR; INTRAVENOUS AS NEEDED
Status: DISCONTINUED | OUTPATIENT
Start: 2020-07-06 | End: 2020-07-06 | Stop reason: HOSPADM

## 2020-07-06 RX ORDER — PROPOFOL 10 MG/ML
INJECTION, EMULSION INTRAVENOUS AS NEEDED
Status: DISCONTINUED | OUTPATIENT
Start: 2020-07-06 | End: 2020-07-06 | Stop reason: HOSPADM

## 2020-07-06 RX ORDER — HYDROMORPHONE HYDROCHLORIDE 2 MG/ML
0.5 INJECTION, SOLUTION INTRAMUSCULAR; INTRAVENOUS; SUBCUTANEOUS
Status: DISCONTINUED | OUTPATIENT
Start: 2020-07-06 | End: 2020-07-06 | Stop reason: HOSPADM

## 2020-07-06 RX ORDER — BACITRACIN 50000 [IU]/1
INJECTION, POWDER, FOR SOLUTION INTRAMUSCULAR AS NEEDED
Status: DISCONTINUED | OUTPATIENT
Start: 2020-07-06 | End: 2020-07-06 | Stop reason: HOSPADM

## 2020-07-06 RX ORDER — OXYCODONE HYDROCHLORIDE 5 MG/1
10 TABLET ORAL
Status: DISCONTINUED | OUTPATIENT
Start: 2020-07-06 | End: 2020-07-06 | Stop reason: HOSPADM

## 2020-07-06 RX ORDER — ACETAMINOPHEN 500 MG
1000 TABLET ORAL ONCE
Status: COMPLETED | OUTPATIENT
Start: 2020-07-06 | End: 2020-07-06

## 2020-07-06 RX ORDER — LIDOCAINE HYDROCHLORIDE 10 MG/ML
0.1 INJECTION INFILTRATION; PERINEURAL AS NEEDED
Status: DISCONTINUED | OUTPATIENT
Start: 2020-07-06 | End: 2020-07-06 | Stop reason: HOSPADM

## 2020-07-06 RX ORDER — EPHEDRINE SULFATE/0.9% NACL/PF 50 MG/5 ML
SYRINGE (ML) INTRAVENOUS AS NEEDED
Status: DISCONTINUED | OUTPATIENT
Start: 2020-07-06 | End: 2020-07-06 | Stop reason: HOSPADM

## 2020-07-06 RX ORDER — DEXAMETHASONE SODIUM PHOSPHATE 4 MG/ML
INJECTION, SOLUTION INTRA-ARTICULAR; INTRALESIONAL; INTRAMUSCULAR; INTRAVENOUS; SOFT TISSUE AS NEEDED
Status: DISCONTINUED | OUTPATIENT
Start: 2020-07-06 | End: 2020-07-06 | Stop reason: HOSPADM

## 2020-07-06 RX ORDER — LIDOCAINE HYDROCHLORIDE 20 MG/ML
INJECTION, SOLUTION EPIDURAL; INFILTRATION; INTRACAUDAL; PERINEURAL AS NEEDED
Status: DISCONTINUED | OUTPATIENT
Start: 2020-07-06 | End: 2020-07-06 | Stop reason: HOSPADM

## 2020-07-06 RX ORDER — CEFAZOLIN SODIUM/WATER 2 G/20 ML
2 SYRINGE (ML) INTRAVENOUS ONCE
Status: COMPLETED | OUTPATIENT
Start: 2020-07-06 | End: 2020-07-06

## 2020-07-06 RX ORDER — SCOLOPAMINE TRANSDERMAL SYSTEM 1 MG/1
1 PATCH, EXTENDED RELEASE TRANSDERMAL ONCE
Status: DISCONTINUED | OUTPATIENT
Start: 2020-07-06 | End: 2020-07-06 | Stop reason: HOSPADM

## 2020-07-06 RX ORDER — FLUMAZENIL 0.1 MG/ML
0.2 INJECTION INTRAVENOUS AS NEEDED
Status: DISCONTINUED | OUTPATIENT
Start: 2020-07-06 | End: 2020-07-06 | Stop reason: HOSPADM

## 2020-07-06 RX ORDER — NALOXONE HYDROCHLORIDE 0.4 MG/ML
0.1 INJECTION, SOLUTION INTRAMUSCULAR; INTRAVENOUS; SUBCUTANEOUS
Status: DISCONTINUED | OUTPATIENT
Start: 2020-07-06 | End: 2020-07-06 | Stop reason: HOSPADM

## 2020-07-06 RX ORDER — EPINEPHRINE 1 MG/ML
INJECTION, SOLUTION, CONCENTRATE INTRAVENOUS AS NEEDED
Status: DISCONTINUED | OUTPATIENT
Start: 2020-07-06 | End: 2020-07-06 | Stop reason: HOSPADM

## 2020-07-06 RX ORDER — MIDAZOLAM HYDROCHLORIDE 1 MG/ML
2 INJECTION, SOLUTION INTRAMUSCULAR; INTRAVENOUS
Status: COMPLETED | OUTPATIENT
Start: 2020-07-06 | End: 2020-07-06

## 2020-07-06 RX ORDER — SODIUM CHLORIDE 0.9 % (FLUSH) 0.9 %
5-40 SYRINGE (ML) INJECTION AS NEEDED
Status: DISCONTINUED | OUTPATIENT
Start: 2020-07-06 | End: 2020-07-06 | Stop reason: HOSPADM

## 2020-07-06 RX ORDER — DIPHENHYDRAMINE HYDROCHLORIDE 50 MG/ML
12.5 INJECTION, SOLUTION INTRAMUSCULAR; INTRAVENOUS
Status: DISCONTINUED | OUTPATIENT
Start: 2020-07-06 | End: 2020-07-06 | Stop reason: HOSPADM

## 2020-07-06 RX ADMIN — Medication 2 G: at 11:10

## 2020-07-06 RX ADMIN — MIDAZOLAM 2 MG: 1 INJECTION INTRAMUSCULAR; INTRAVENOUS at 10:39

## 2020-07-06 RX ADMIN — HYDROMORPHONE HYDROCHLORIDE 0.5 MG: 2 INJECTION INTRAMUSCULAR; INTRAVENOUS; SUBCUTANEOUS at 13:52

## 2020-07-06 RX ADMIN — OXYCODONE 5 MG: 5 TABLET ORAL at 14:14

## 2020-07-06 RX ADMIN — FENTANYL CITRATE 50 MCG: 50 INJECTION INTRAMUSCULAR; INTRAVENOUS at 11:10

## 2020-07-06 RX ADMIN — SODIUM CHLORIDE, SODIUM LACTATE, POTASSIUM CHLORIDE, AND CALCIUM CHLORIDE 75 ML/HR: 600; 310; 30; 20 INJECTION, SOLUTION INTRAVENOUS at 10:39

## 2020-07-06 RX ADMIN — FENTANYL CITRATE 50 MCG: 50 INJECTION INTRAMUSCULAR; INTRAVENOUS at 11:16

## 2020-07-06 RX ADMIN — ACETAMINOPHEN 1000 MG: 500 TABLET, FILM COATED ORAL at 09:10

## 2020-07-06 RX ADMIN — DEXAMETHASONE SODIUM PHOSPHATE 5 MG: 4 INJECTION, SOLUTION INTRAMUSCULAR; INTRAVENOUS at 13:06

## 2020-07-06 RX ADMIN — PROPOFOL 200 MG: 10 INJECTION, EMULSION INTRAVENOUS at 11:18

## 2020-07-06 RX ADMIN — HYDROMORPHONE HYDROCHLORIDE 0.5 MG: 2 INJECTION INTRAMUSCULAR; INTRAVENOUS; SUBCUTANEOUS at 13:57

## 2020-07-06 RX ADMIN — FENTANYL CITRATE 25 MCG: 50 INJECTION INTRAMUSCULAR; INTRAVENOUS at 13:43

## 2020-07-06 RX ADMIN — Medication 5 MG: at 11:35

## 2020-07-06 RX ADMIN — LIDOCAINE HYDROCHLORIDE 60 MG: 20 INJECTION, SOLUTION EPIDURAL; INFILTRATION; INTRACAUDAL; PERINEURAL at 11:18

## 2020-07-06 RX ADMIN — FENTANYL CITRATE 25 MCG: 50 INJECTION INTRAMUSCULAR; INTRAVENOUS at 13:18

## 2020-07-06 RX ADMIN — SODIUM CHLORIDE, SODIUM LACTATE, POTASSIUM CHLORIDE, AND CALCIUM CHLORIDE: 600; 310; 30; 20 INJECTION, SOLUTION INTRAVENOUS at 11:34

## 2020-07-06 RX ADMIN — HYDROMORPHONE HYDROCHLORIDE 0.5 MG: 2 INJECTION INTRAMUSCULAR; INTRAVENOUS; SUBCUTANEOUS at 13:46

## 2020-07-06 RX ADMIN — FENTANYL CITRATE 50 MCG: 50 INJECTION INTRAMUSCULAR; INTRAVENOUS at 12:40

## 2020-07-06 RX ADMIN — FENTANYL CITRATE 50 MCG: 50 INJECTION INTRAMUSCULAR; INTRAVENOUS at 11:56

## 2020-07-06 NOTE — DISCHARGE INSTRUCTIONS
Follow up appointment on Friday.    Keep dressing on for 48 hours, then may shower.  Apply bacitracin and gauze to all incision sites daily.    Wear bra to hold dressing. Put on Girdle as soon as she gets home. INSTRUCTIONS FOLLOWING BREAST SURGERY    ACTIVITY   As tolerated or as directed by your doctor.  DO NOT wear tight or restrictive clothing.  Avoid heavy lifting or pulling (no more than 5 pounds). DIET   Clear liquids until no nausea or vomiting; then light diet for the first day.  Advance to regular diet on second day, unless your doctor orders otherwise.  If nausea or vomiting continue, call your doctor. PAIN   Take pain medication as directed by your doctor.  Call your doctor if pain is NOT relieved by medication.  DO NOT take aspirin or blood thinners unless directed by your doctor. DRESSINGS   Keep dressings on for 48 hrs, then may shower  *Apply Bacitracin and gauze to all incision sites daily  *Wear bra to hold dressings in place    SHOWERS AND BATHING   You may shower or bathe 48 hours after your surgery. DO NOT submerge     the incisions or drain sites in a tub bath.  If you have drains, loop the tab through a belt or scarf tied around your waist,     to free up your hands. Showering with the drains in place is not harmful. DRAINS   If you go home with a drain, you or a family member will be given care     instructions. You will be shown how to empty, measure and record the      output. This output number is important, as it will determine when the drain     may be removed. The nursing staff will review drain care with you before you go home. FOLLOW-UP PHONE [address] will be made by nursing staff.  If you have any problems, call your doctor.     CALL YOUR DOCTOR IF YOU HAVE:   Excessive bleeding that does not stop after holding mild pressure over the area   Temperature of 101°F or above   Redness, excessive swelling or bruising, uneven size or hardness of the breast,     and/or green or yellow, foul-smelling discharge from incision.  Excessive leakage around drain site    AFTER ANESTHESIA   For the first 24 hours: DO NOT drive, drink alcoholic beverages, or make important       decisions.  Be aware of dizziness following anesthesia and while taking pain medication. After general anesthesia or intravenous sedation, for 24 hours or while taking prescription Narcotics:  · Limit your activities  · A responsible adult needs to be with you for the next 24 hours  · Do not drive and operate hazardous machinery  · Do not make important personal or business decisions  · Do not drink alcoholic beverages  · If you have not urinated within 8 hours after discharge, please contact your surgeon on call. · If you have sleep apnea and have a CPAP machine, please use it for all naps and sleeping. · Please use caution when taking narcotics and any of your home medications that may cause drowsiness. *  Please give a list of your current medications to your Primary Care Provider. *  Please update this list whenever your medications are discontinued, doses are      changed, or new medications (including over-the-counter products) are added. *  Please carry medication information at all times in case of emergency situations. These are general instructions for a healthy lifestyle:  No smoking/ No tobacco products/ Avoid exposure to second hand smoke  Surgeon General's Warning:  Quitting smoking now greatly reduces serious risk to your health.   Obesity, smoking, and sedentary lifestyle greatly increases your risk for illness  A healthy diet, regular physical exercise & weight monitoring are important for maintaining a healthy lifestyle    You may be retaining fluid if you have a history of heart failure or if you experience any of the following symptoms:  Weight gain of 3 pounds or more overnight or 5 pounds in a week, increased swelling in our hands or feet or shortness of breath while lying flat in bed. Please call your doctor as soon as you notice any of these symptoms; do not wait until your next office visit.

## 2020-07-06 NOTE — ANESTHESIA PREPROCEDURE EVALUATION
Relevant Problems   No relevant active problems       Anesthetic History   No history of anesthetic complications            Review of Systems / Medical History  Patient summary reviewed and pertinent labs reviewed    Pulmonary  Within defined limits                 Neuro/Psych   Within defined limits           Cardiovascular                  Exercise tolerance: >4 METS     GI/Hepatic/Renal  Within defined limits              Endo/Other  Within defined limits           Other Findings   Comments: Hx of breast ca and surgery         Physical Exam    Airway  Mallampati: II  TM Distance: > 6 cm  Neck ROM: normal range of motion   Mouth opening: Normal     Cardiovascular    Rhythm: regular           Dental  No notable dental hx       Pulmonary                 Abdominal  GI exam deferred       Other Findings            Anesthetic Plan    ASA: 2  Anesthesia type: general          Induction: Intravenous  Anesthetic plan and risks discussed with: Patient

## 2020-07-06 NOTE — BRIEF OP NOTE
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

## 2020-07-06 NOTE — ANESTHESIA POSTPROCEDURE EVALUATION
Procedure(s):  BILATERAL REVISION OF BREAST RECONSTRUCTION/  BILATERAL FAT GRAFTING.     general    Anesthesia Post Evaluation      Multimodal analgesia: multimodal analgesia used between 6 hours prior to anesthesia start to PACU discharge  Patient location during evaluation: PACU  Patient participation: complete - patient participated  Level of consciousness: awake  Pain management: adequate  Airway patency: patent  Anesthetic complications: no  Cardiovascular status: acceptable  Respiratory status: acceptable  Hydration status: acceptable  Post anesthesia nausea and vomiting:  none  Final Post Anesthesia Temperature Assessment:  Normothermia (36.0-37.5 degrees C)      INITIAL Post-op Vital signs:   Vitals Value Taken Time   BP 97/67 7/6/2020  2:10 PM   Temp 36.8 °C (98.3 °F) 7/6/2020  1:42 PM   Pulse 64 7/6/2020  2:10 PM   Resp 15 7/6/2020  2:10 PM   SpO2 96 % 7/6/2020  2:10 PM

## 2020-07-08 NOTE — OP NOTES
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    PROCEDURES PERFORMED:  Bilateral breast reconstruction revision with bilateral fat grafting and bilateral scar revision as well as left capsulorrhaphy. SURGEON:  Sukhwinder Stephenson MD    ASSISTANT:  Dre PATTON    ANESTHESIA:  General.    COMPLICATIONS:  None. SPECIMENS REMOVED:  None. IMPLANTS:  As below    ESTIMATED BLOOD LOSS:  25 mL. INDICATIONS:  The patient presents a few years following breast reconstruction now with some deformity and disproportion as things have settled and the shape has changed over time. She has significant retracted scars from her original mastectomy as well as some tenderness and displacement of the left implant and then loss of fullness in the superior medial aspects of the breast bilaterally. PROCEDURE:  After informed consent was obtained from the patient, she was marked in the holding area in the upright position. She was then taken to the operating room, placed in the supine position, and prepped and draped in the usual fashion. She has selected a donor site of the outer thigh for her fat grafting. This area was prepared with tumescent fluid. Approximately 300 mL per side was infiltrated into the outer thigh area. Next then liposuction of that area into the revolve system for fat grafting was performed. This was done with a combination of a 5 mm and a 4 mm and a 3 mm liposuction cannula. Care was taken to do this from two directions to get a nice even contour shape as well as an adequate resection for grafting.   Fat grafts were taken from both sides and once that was completed, then additional shaping was done to make sure she had a nice smooth result without any contour irregularities. The fat grafts were prepared according to the package directions of the [employer] system. The grafts were washed and then placed in 10 mL syringes. The breast dense scar revision was performed. On both sides, the patient had significant retracted scars around her nipple-sparing incision as well as the lateral extension, so this was excised with a 15 blade completely. The muscle was then opened and the implants were removed and placed in sterile containers with antibiotic irrigation and covered with a sterile towel. Sizers were then placed into the pocket temporarily to avoid any damage to the actual implants and then the fat grafting was performed superiorly and medially in the areas that have been marked preoperatively. A 130 mL of graft was placed on each side. The patient was then inspected in the upright position for symmetry and found to have a very symmetric shape and result. In addition, prior to the fat grafting on the left side, a popcorn technique was used for the capsulorrhaphy along the medial aspect of the capsule where she had been having a lot of tenderness and pulling as well as along the lower pole, so this was done extensively prior to placement of the sizers. Next then the patient was placed back in the supine position. The sizers were removed and then the pockets were irrigated with antibiotic irrigation and skin reprepped with Betadine and the implants were placed back into the pockets. Care was taken to make sure the right implant stayed on the right side and the left stayed on the left. She was then closed in three layers. The muscle was closed with deep 3-0 Vicryl suture and then a 4-0 Vicryl was used in the subdermal layer and a running 5-0 Monocryl in the skin. The patient tolerated the procedure very well.   She was dressed with Xeroform, bacitracin gauze, ABD pads, and a surgical bra and escorted to Recovery in stable condition.       MD MAGGIE Conroy/[email or web address]  D:  07/08/2020 14:03  T:  07/08/2020 18:18  JOB #:  [id number]

## 2021-02-15 ENCOUNTER — TRANSCRIBE ORDER (OUTPATIENT)
Dept: SCHEDULING | Age: 41
End: 2021-02-15

## 2021-02-15 DIAGNOSIS — N63.20 MASS OF LEFT BREAST: Primary | ICD-10-CM

## 2021-02-15 DIAGNOSIS — Z85.3 PERSONAL HISTORY OF MALIGNANT NEOPLASM OF BREAST: ICD-10-CM

## 2021-02-26 ENCOUNTER — HOSPITAL ENCOUNTER (OUTPATIENT)
Dept: MRI IMAGING | Age: 41
Discharge: HOME | End: 2021-02-26
Attending: PLASTIC SURGERY
Payer: COMMERCIAL

## 2021-02-26 DIAGNOSIS — N63.20 MASS OF LEFT BREAST: ICD-10-CM

## 2021-02-26 DIAGNOSIS — Z85.3 PERSONAL HISTORY OF MALIGNANT NEOPLASM OF BREAST: ICD-10-CM

## 2021-02-26 PROCEDURE — 77049 MRI BREAST C-+ W/CAD BI: CPT

## 2021-02-26 PROCEDURE — A9575 INJ GADOTERATE MEGLUMI 0.1ML: HCPCS | Performed by: PLASTIC SURGERY

## 2021-02-26 PROCEDURE — 74011000258 HC RX REV CODE- 258: Performed by: PLASTIC SURGERY

## 2021-02-26 PROCEDURE — 74011250636 HC RX REV CODE- 250/636: Performed by: PLASTIC SURGERY

## 2021-02-26 RX ORDER — GADOTERATE MEGLUMINE 376.9 MG/ML
16 INJECTION INTRAVENOUS
Status: COMPLETED | OUTPATIENT
Start: 2021-02-26 | End: 2021-02-26

## 2021-02-26 RX ORDER — SODIUM CHLORIDE 0.9 % (FLUSH) 0.9 %
10 SYRINGE (ML) INJECTION
Status: COMPLETED | OUTPATIENT
Start: 2021-02-26 | End: 2021-02-26

## 2021-02-26 RX ADMIN — Medication 10 ML: at 08:39

## 2021-02-26 RX ADMIN — SODIUM CHLORIDE 100 ML: 900 INJECTION, SOLUTION INTRAVENOUS at 08:39

## 2021-02-26 RX ADMIN — GADOTERATE MEGLUMINE 16 ML: 376.9 INJECTION INTRAVENOUS at 08:39

## 2021-03-02 ENCOUNTER — HOSPITAL ENCOUNTER (OUTPATIENT)
Dept: MAMMOGRAPHY | Age: 41
Discharge: HOME | End: 2021-03-02
Attending: INTERNAL MEDICINE
Payer: COMMERCIAL

## 2021-03-02 VITALS — SYSTOLIC BLOOD PRESSURE: 167 MMHG | DIASTOLIC BLOOD PRESSURE: 60 MMHG | HEART RATE: 60 BPM

## 2021-03-02 DIAGNOSIS — R92.8 ABNORMAL FINDING ON MAMMOGRAPHY: ICD-10-CM

## 2021-03-02 DIAGNOSIS — R92.8 ABNORMAL MRI, BREAST: ICD-10-CM

## 2021-03-02 DIAGNOSIS — R92.8 ABNORMAL MAMMOGRAM OF LEFT BREAST: ICD-10-CM

## 2021-03-02 PROCEDURE — 76642 ULTRASOUND BREAST LIMITED: CPT

## 2021-03-02 PROCEDURE — 74011000250 HC RX REV CODE- 250: Performed by: INTERNAL MEDICINE

## 2021-03-02 PROCEDURE — 77030013267 US BX BREAST LT 1ST LESION W/CLIP AND SPECIMEN

## 2021-03-02 PROCEDURE — 77065 DX MAMMO INCL CAD UNI: CPT

## 2021-03-02 PROCEDURE — 88305 TISSUE EXAM BY PATHOLOGIST: CPT

## 2021-03-02 RX ORDER — LIDOCAINE HYDROCHLORIDE 10 MG/ML
6 INJECTION INFILTRATION; PERINEURAL
Status: COMPLETED | OUTPATIENT
Start: 2021-03-02 | End: 2021-03-02

## 2021-03-02 RX ADMIN — LIDOCAINE HYDROCHLORIDE 6 ML: 10 INJECTION, SOLUTION INFILTRATION; PERINEURAL at 09:29

## 2021-03-08 ENCOUNTER — PATIENT OUTREACH (OUTPATIENT)
Dept: CASE MANAGEMENT | Age: 41
End: 2021-03-08

## 2021-03-08 NOTE — PROGRESS NOTES
3/8/2021 Saw the patient with Dr Vj Means. She noticed an area on the breast a couple of weeks ago and a biopsy has been done and this is triple negative breast cancer. Dr Vj Means discussed that she would likely need additional genetic testing as she only had BRACA 1 and 2 run previously. Dr Vj Means discussed that he would likely treat with Carbo/Taxol after surgery. Navigation information given to the patient. Will continue to follow.

## 2021-03-16 PROBLEM — C50.912 RECURRENT BREAST CANCER, LEFT (HCC): Status: ACTIVE | Noted: 2021-03-16

## 2021-03-17 RX ORDER — SODIUM CHLORIDE 0.9 % (FLUSH) 0.9 %
5-40 SYRINGE (ML) INJECTION AS NEEDED
Status: CANCELLED | OUTPATIENT
Start: 2021-03-17

## 2021-03-17 RX ORDER — SODIUM CHLORIDE 0.9 % (FLUSH) 0.9 %
5-40 SYRINGE (ML) INJECTION EVERY 8 HOURS
Status: CANCELLED | OUTPATIENT
Start: 2021-03-17

## 2021-03-30 ENCOUNTER — ANESTHESIA EVENT (OUTPATIENT)
Dept: SURGERY | Age: 41
End: 2021-03-30
Payer: COMMERCIAL

## 2021-03-31 ENCOUNTER — HOSPITAL ENCOUNTER (OUTPATIENT)
Age: 41
Setting detail: OUTPATIENT SURGERY
Discharge: HOME | End: 2021-03-31
Attending: SURGERY | Admitting: SURGERY
Payer: COMMERCIAL

## 2021-03-31 ENCOUNTER — ANESTHESIA (OUTPATIENT)
Dept: SURGERY | Age: 41
End: 2021-03-31
Payer: COMMERCIAL

## 2021-03-31 VITALS
RESPIRATION RATE: 16 BRPM | SYSTOLIC BLOOD PRESSURE: 102 MMHG | BODY MASS INDEX: 29.66 KG/M2 | HEART RATE: 57 BPM | OXYGEN SATURATION: 100 % | TEMPERATURE: 97.8 F | WEIGHT: 172.8 LBS | DIASTOLIC BLOOD PRESSURE: 65 MMHG

## 2021-03-31 DIAGNOSIS — C50.912 RECURRENT BREAST CANCER, LEFT (HCC): Primary | ICD-10-CM

## 2021-03-31 LAB — HCG UR QL: NEGATIVE

## 2021-03-31 PROCEDURE — 77030019908 HC STETH ESOPH SIMS -A: Performed by: ANESTHESIOLOGY

## 2021-03-31 PROCEDURE — 74011250636 HC RX REV CODE- 250/636: Performed by: STUDENT IN AN ORGANIZED HEALTH CARE EDUCATION/TRAINING PROGRAM

## 2021-03-31 PROCEDURE — 77030002933 HC SUT MCRYL J&J -A: Performed by: SURGERY

## 2021-03-31 PROCEDURE — 76210000006 HC OR PH I REC 0.5 TO 1 HR: Performed by: SURGERY

## 2021-03-31 PROCEDURE — 74011000272 HC RX REV CODE- 272: Performed by: SURGERY

## 2021-03-31 PROCEDURE — 74011250637 HC RX REV CODE- 250/637: Performed by: STUDENT IN AN ORGANIZED HEALTH CARE EDUCATION/TRAINING PROGRAM

## 2021-03-31 PROCEDURE — 74011000250 HC RX REV CODE- 250: Performed by: NURSE ANESTHETIST, CERTIFIED REGISTERED

## 2021-03-31 PROCEDURE — 77030038552 HC DRN WND MDII -A: Performed by: SURGERY

## 2021-03-31 PROCEDURE — 76060000034 HC ANESTHESIA 1.5 TO 2 HR: Performed by: SURGERY

## 2021-03-31 PROCEDURE — 77030010512 HC APPL CLP LIG J&J -C: Performed by: SURGERY

## 2021-03-31 PROCEDURE — 19302 P-MASTECTOMY W/LN REMOVAL: CPT | Performed by: SURGERY

## 2021-03-31 PROCEDURE — 76010000162 HC OR TIME 1.5 TO 2 HR INTENSV-TIER 1: Performed by: SURGERY

## 2021-03-31 PROCEDURE — 74011250636 HC RX REV CODE- 250/636: Performed by: SURGERY

## 2021-03-31 PROCEDURE — 77030037088 HC TUBE ENDOTRACH ORAL NSL COVD-A: Performed by: ANESTHESIOLOGY

## 2021-03-31 PROCEDURE — 81025 URINE PREGNANCY TEST: CPT

## 2021-03-31 PROCEDURE — 2709999900 HC NON-CHARGEABLE SUPPLY: Performed by: SURGERY

## 2021-03-31 PROCEDURE — 77030039425 HC BLD LARYNG TRULITE DISP TELE -A: Performed by: ANESTHESIOLOGY

## 2021-03-31 PROCEDURE — 77030002996 HC SUT SLK J&J -A: Performed by: SURGERY

## 2021-03-31 PROCEDURE — 77030040922 HC BLNKT HYPOTHRM STRY -A: Performed by: ANESTHESIOLOGY

## 2021-03-31 PROCEDURE — 77030031139 HC SUT VCRL2 J&J -A: Performed by: SURGERY

## 2021-03-31 PROCEDURE — 88305 TISSUE EXAM BY PATHOLOGIST: CPT

## 2021-03-31 PROCEDURE — 76210000021 HC REC RM PH II 0.5 TO 1 HR: Performed by: SURGERY

## 2021-03-31 PROCEDURE — 74011250636 HC RX REV CODE- 250/636: Performed by: NURSE ANESTHETIST, CERTIFIED REGISTERED

## 2021-03-31 RX ORDER — SODIUM CHLORIDE, SODIUM LACTATE, POTASSIUM CHLORIDE, CALCIUM CHLORIDE 600; 310; 30; 20 MG/100ML; MG/100ML; MG/100ML; MG/100ML
100 INJECTION, SOLUTION INTRAVENOUS CONTINUOUS
Status: DISCONTINUED | OUTPATIENT
Start: 2021-03-31 | End: 2021-03-31 | Stop reason: HOSPADM

## 2021-03-31 RX ORDER — NALOXONE HYDROCHLORIDE 0.4 MG/ML
0.1 INJECTION, SOLUTION INTRAMUSCULAR; INTRAVENOUS; SUBCUTANEOUS AS NEEDED
Status: DISCONTINUED | OUTPATIENT
Start: 2021-03-31 | End: 2021-03-31 | Stop reason: HOSPADM

## 2021-03-31 RX ORDER — SODIUM CHLORIDE 0.9 % (FLUSH) 0.9 %
5-40 SYRINGE (ML) INJECTION EVERY 8 HOURS
Status: DISCONTINUED | OUTPATIENT
Start: 2021-03-31 | End: 2021-03-31 | Stop reason: HOSPADM

## 2021-03-31 RX ORDER — MIDAZOLAM HYDROCHLORIDE 1 MG/ML
2 INJECTION, SOLUTION INTRAMUSCULAR; INTRAVENOUS
Status: COMPLETED | OUTPATIENT
Start: 2021-03-31 | End: 2021-03-31

## 2021-03-31 RX ORDER — ROCURONIUM BROMIDE 10 MG/ML
INJECTION, SOLUTION INTRAVENOUS AS NEEDED
Status: DISCONTINUED | OUTPATIENT
Start: 2021-03-31 | End: 2021-03-31 | Stop reason: HOSPADM

## 2021-03-31 RX ORDER — APREPITANT 40 MG/1
40 CAPSULE ORAL ONCE
Status: COMPLETED | OUTPATIENT
Start: 2021-03-31 | End: 2021-03-31

## 2021-03-31 RX ORDER — FLUMAZENIL 0.1 MG/ML
0.2 INJECTION INTRAVENOUS
Status: DISCONTINUED | OUTPATIENT
Start: 2021-03-31 | End: 2021-03-31 | Stop reason: HOSPADM

## 2021-03-31 RX ORDER — PROMETHAZINE HYDROCHLORIDE 25 MG/ML
INJECTION, SOLUTION INTRAMUSCULAR; INTRAVENOUS AS NEEDED
Status: DISCONTINUED | OUTPATIENT
Start: 2021-03-31 | End: 2021-03-31 | Stop reason: HOSPADM

## 2021-03-31 RX ORDER — CEFAZOLIN SODIUM/WATER 2 G/20 ML
2 SYRINGE (ML) INTRAVENOUS ONCE
Status: COMPLETED | OUTPATIENT
Start: 2021-03-31 | End: 2021-03-31

## 2021-03-31 RX ORDER — SODIUM CHLORIDE 0.9 % (FLUSH) 0.9 %
5-40 SYRINGE (ML) INJECTION AS NEEDED
Status: DISCONTINUED | OUTPATIENT
Start: 2021-03-31 | End: 2021-03-31 | Stop reason: HOSPADM

## 2021-03-31 RX ORDER — OXYCODONE AND ACETAMINOPHEN 5; 325 MG/1; MG/1
1 TABLET ORAL
Qty: 20 TAB | Refills: 0 | Status: SHIPPED | OUTPATIENT
Start: 2021-03-31 | End: 2021-04-07

## 2021-03-31 RX ORDER — FENTANYL CITRATE 50 UG/ML
INJECTION, SOLUTION INTRAMUSCULAR; INTRAVENOUS AS NEEDED
Status: DISCONTINUED | OUTPATIENT
Start: 2021-03-31 | End: 2021-03-31 | Stop reason: HOSPADM

## 2021-03-31 RX ORDER — LIDOCAINE HYDROCHLORIDE 10 MG/ML
0.1 INJECTION INFILTRATION; PERINEURAL AS NEEDED
Status: DISCONTINUED | OUTPATIENT
Start: 2021-03-31 | End: 2021-03-31 | Stop reason: HOSPADM

## 2021-03-31 RX ORDER — DIPHENHYDRAMINE HYDROCHLORIDE 50 MG/ML
12.5 INJECTION, SOLUTION INTRAMUSCULAR; INTRAVENOUS
Status: DISCONTINUED | OUTPATIENT
Start: 2021-03-31 | End: 2021-03-31 | Stop reason: HOSPADM

## 2021-03-31 RX ORDER — LIDOCAINE HYDROCHLORIDE 20 MG/ML
INJECTION, SOLUTION EPIDURAL; INFILTRATION; INTRACAUDAL; PERINEURAL AS NEEDED
Status: DISCONTINUED | OUTPATIENT
Start: 2021-03-31 | End: 2021-03-31 | Stop reason: HOSPADM

## 2021-03-31 RX ORDER — GLYCOPYRROLATE 0.2 MG/ML
INJECTION INTRAMUSCULAR; INTRAVENOUS AS NEEDED
Status: DISCONTINUED | OUTPATIENT
Start: 2021-03-31 | End: 2021-03-31 | Stop reason: HOSPADM

## 2021-03-31 RX ORDER — HYDROMORPHONE HYDROCHLORIDE 1 MG/ML
0.5 INJECTION, SOLUTION INTRAMUSCULAR; INTRAVENOUS; SUBCUTANEOUS
Status: DISCONTINUED | OUTPATIENT
Start: 2021-03-31 | End: 2021-03-31 | Stop reason: HOSPADM

## 2021-03-31 RX ORDER — OXYCODONE HYDROCHLORIDE 5 MG/1
5 TABLET ORAL
Status: COMPLETED | OUTPATIENT
Start: 2021-03-31 | End: 2021-03-31

## 2021-03-31 RX ORDER — NEOSTIGMINE METHYLSULFATE 1 MG/ML
INJECTION, SOLUTION INTRAVENOUS AS NEEDED
Status: DISCONTINUED | OUTPATIENT
Start: 2021-03-31 | End: 2021-03-31 | Stop reason: HOSPADM

## 2021-03-31 RX ORDER — PROPOFOL 10 MG/ML
INJECTION, EMULSION INTRAVENOUS AS NEEDED
Status: DISCONTINUED | OUTPATIENT
Start: 2021-03-31 | End: 2021-03-31 | Stop reason: HOSPADM

## 2021-03-31 RX ADMIN — SODIUM CHLORIDE, SODIUM LACTATE, POTASSIUM CHLORIDE, AND CALCIUM CHLORIDE 100 ML/HR: 600; 310; 30; 20 INJECTION, SOLUTION INTRAVENOUS at 09:45

## 2021-03-31 RX ADMIN — PHENYLEPHRINE HYDROCHLORIDE 120 MCG: 10 INJECTION INTRAVENOUS at 12:42

## 2021-03-31 RX ADMIN — OXYCODONE 5 MG: 5 TABLET ORAL at 14:31

## 2021-03-31 RX ADMIN — FENTANYL CITRATE 50 MCG: 50 INJECTION INTRAMUSCULAR; INTRAVENOUS at 14:02

## 2021-03-31 RX ADMIN — LIDOCAINE HYDROCHLORIDE 50 MG: 20 INJECTION, SOLUTION EPIDURAL; INFILTRATION; INTRACAUDAL; PERINEURAL at 12:21

## 2021-03-31 RX ADMIN — PHENYLEPHRINE HYDROCHLORIDE 120 MCG: 10 INJECTION INTRAVENOUS at 12:51

## 2021-03-31 RX ADMIN — CEFAZOLIN 2 G: 1 INJECTION, POWDER, FOR SOLUTION INTRAVENOUS at 12:30

## 2021-03-31 RX ADMIN — PROPOFOL 170 MG: 10 INJECTION, EMULSION INTRAVENOUS at 12:21

## 2021-03-31 RX ADMIN — FENTANYL CITRATE 100 MCG: 50 INJECTION INTRAMUSCULAR; INTRAVENOUS at 12:21

## 2021-03-31 RX ADMIN — HYDROMORPHONE HYDROCHLORIDE 0.5 MG: 1 INJECTION, SOLUTION INTRAMUSCULAR; INTRAVENOUS; SUBCUTANEOUS at 14:50

## 2021-03-31 RX ADMIN — FENTANYL CITRATE 25 MCG: 50 INJECTION INTRAMUSCULAR; INTRAVENOUS at 13:50

## 2021-03-31 RX ADMIN — MIDAZOLAM 2 MG: 1 INJECTION INTRAMUSCULAR; INTRAVENOUS at 11:52

## 2021-03-31 RX ADMIN — ROCURONIUM BROMIDE 50 MG: 10 INJECTION, SOLUTION INTRAVENOUS at 12:22

## 2021-03-31 RX ADMIN — APREPITANT 40 MG: 40 CAPSULE ORAL at 09:43

## 2021-03-31 RX ADMIN — PROMETHAZINE HYDROCHLORIDE 6.25 MG: 25 INJECTION INTRAMUSCULAR; INTRAVENOUS at 12:28

## 2021-03-31 RX ADMIN — SODIUM CHLORIDE, SODIUM LACTATE, POTASSIUM CHLORIDE, AND CALCIUM CHLORIDE: 600; 310; 30; 20 INJECTION, SOLUTION INTRAVENOUS at 14:03

## 2021-03-31 RX ADMIN — Medication 3 MG: at 13:30

## 2021-03-31 RX ADMIN — HYDROMORPHONE HYDROCHLORIDE 0.5 MG: 1 INJECTION, SOLUTION INTRAMUSCULAR; INTRAVENOUS; SUBCUTANEOUS at 14:40

## 2021-03-31 RX ADMIN — GLYCOPYRROLATE 0.4 MG: 0.2 INJECTION, SOLUTION INTRAMUSCULAR; INTRAVENOUS at 13:30

## 2021-03-31 NOTE — BRIEF OP NOTE
Brief Postoperative Note    Patient: Jensen Zambrano  YOB: 1980  MRN: [id number]    Date of Procedure: 3/31/2021     Pre-Op Diagnosis: Malignant neoplasm of lower-inner quadrant of left female breast, unspecified estrogen receptor status ([facility] [address] [C50.312]  Malignant neoplasm of left female breast, unspecified estrogen receptor status, unspecified site of breast ([facility] [address] [C50.912]    Post-Op Diagnosis: Same      Procedure(s):  LEFT BREAST LUMPECTOMY  LEFT AXILLARY DISSECTION  LEFT BREAST COMPLEX CLOSURE W/ POSS LEFT BREAST IMPLANT EXCHANGE    Surgeon(s):  MD Cristino Arrington MD    Surgical Assistant: None    Anesthesia: General     Estimated Blood Loss (mL): 5cc    Complications: none    Specimens:   ID Type Source Tests Collected by Time Destination   1 : left breast lesion Preservative Breast  Tamiko Moctezuma MD 3/56/3687 1246 Pathology        Implants: * No implants in log *    Drains: * No LDAs found *    Findings: none    Electronically Signed by Cami Lynch MD on 3/31/2021 at 12:56 PM

## 2021-03-31 NOTE — ANESTHESIA PREPROCEDURE EVALUATION
Relevant Problems   No relevant active problems       Anesthetic History   No history of anesthetic complications            Review of Systems / Medical History  Patient summary reviewed and pertinent labs reviewed    Pulmonary  Within defined limits                 Neuro/Psych   Within defined limits           Cardiovascular  Within defined limits                Exercise tolerance: >4 METS     GI/Hepatic/Renal  Within defined limits              Endo/Other             Other Findings   Comments: Hx of breast ca and surgery           Physical Exam    Airway  Mallampati: II  TM Distance: > 6 cm  Neck ROM: normal range of motion   Mouth opening: Normal     Cardiovascular    Rhythm: regular  Rate: normal         Dental  No notable dental hx       Pulmonary                 Abdominal  GI exam deferred       Other Findings            Anesthetic Plan    ASA: 2  Anesthesia type: general          Induction: Intravenous  Anesthetic plan and risks discussed with: Patient and Family

## 2021-03-31 NOTE — H&P
3/24/2021  Mirella Edwards, 47, F   [facility] Plastic Surgery   Pre-Operative History and Physical  Patient Information  :  Patient Information-  Rachele Hebert   : 1980   Scheduled Services for : Alhaji Trivedi   Appt Purpose: Breast Implant Exchange - Left Appt Notes: Surgery 3/31/21   Hospital PreOp by Phone   Referral Source: Doctor   [facility]upation: [facility] [place] Plan: [facility]   Surgery date: 3/31/2021 Breast Implant Exchange - Bilateral Dr Meera Patel   Signature        Medications / Allergies  :  List any allergies: Allergy Reaction   1 Loratab Severe itching   2 zofran    List any current medications (Please include over-the-counter medications)      Drug Dosage   1. doxycycline 10mg   Non-steroidal anti-inflammatory drugs (ex. Motrin, Aleve)  Patient denies regular use of NSAID's. Do you take vitamins/minerals? No       Current Medications  Name Sig Start Date Discontinue Date   cefadroxil 500 mg capsule 1 capsule by mouth BID     cyclobenzaprine 10 mg tablet 1 tablet by mouth Q6-8h     None Add'l Sig Add'l Sig Add'l Sig Massage firmly to scar area 2-3 times daily for 5-10 minutes. External use only     Norco 325 mg-7.5 mg tablet Take tablets     Roxicodone 5 mg tablet 1-2 tablet by mouth Q4-6h 3/24/2021    scopolamine 1.5 mg transdermal patch (1 mg over 3 days) 1 patch apply on the skin as directed Apply behind ear or under upper arm night before surgery. 2020    Transderm-Scop 1.5 mg transdermal patch (1 mg over 3 days) 1 Add'l Sig apply on the skin as directed     ZOFRAN ODT 8 mg disintegrating tablet 1 tablet by mouth as directed Let 1 tab dissolve on tongue AM of surgery     Height / Weight / BMI:  Height/Weight/BMI  Her height is 5ft 4in and weight is 170lbs. BMI is 29.18. Social / Family History:  ~MUS2 - Smoking Status  Never smoker.    Social History  Patient denies using illegal drugs, denies high risk factors and admits alochol use socially.    Family History  Cancer Sister Thyroid   Diabetes Paternal Grandmother   High Blood Pressure Mother   Lung Disease Maternal Grandmother COPD  Vital Signs:  ~MUS2 - Blood Pressure  103/65   Vitals  Pulse 67 and RAPS02 99%   Pre-Operative Diagnosis:  Diagnosis - Text  Diagnosis: Recurrent Left Breast Cancer   Planned Procedure:  Planned Procedure  Left Breast Closure with possible Implant Exchange   History of Present Illness  Past Medical / Surgical History:  Past Medical History  Breast Cancer <Details>  Review of Systems (check all current symptoms)  arthritis/joint disformity - no asthma - no chest pain - no heart attack - no high blood pressure - no inflamation of veins - no murmur - no pacemaker - no phlebitis - no convulsions - no epilepsy or seizures - no fainting - no fever or chills - no heart disease - no nausea, vomiting, diarrheah when taking antibiotics - no unexpected weight loss or weight gain - no chronic cough - no hearing loss *YES* morning cough - no sinus disorder - no diabetes - no excessive thirst/hunger - no frequent urination - no thyroid disorder - no abdominal pain - no chronic diarrhea or constipation - no gastro-intestinal problems - no irregular heartbeat - no stomach absorptive disorder - no ulcers - no bladder problems - no currently breast feeding - no currently pregnant - no frequency/burning during urination - no kidney problems - no yeast infection - no anemia - no bleed easily - no blood clots - no blood transfusion - no arthralgia - no artificial joints - no limited motion in joints - no muscle weakness - no paralysis - no stroke - no numbness or tingling - no headache - no migraines - no bronchitis - no emphysema - no shortness of breath - no tuberculosis - no wheezing - no psychiatric treatment - no recent crisis - no history of psychiatric hospitilization - no   Past Surgeries  Right Knee Surgery 2004   Uterine polyp removed    Cyst removed from right foot 2013    2012   bilateral mastectomies with immediate breast reconstruction with tissues expanders   exchange tissue expanders for permanent implants  Anesthesia History  - No past anesthesia problems. Ethnic Origin  Diagnosis Codes   :  Diagnosis - Breast  History of CA, Personal   Diagnosis Codes  Breast   Pre-Operative Physical Exam:  HEENT  HEENT: Deferred. Cardiac Exam II  Cardiac Exam: WNL. Pulmonary Exam  Pulmonary Exam: WNL. Abdominal Exam  Abdominal Exam: Deferred. Extremity & Neuro Exam  Extremity & Neuro Exam: WNL. Pertinent System/Surgical Site Exam  Breast   Pre-Operative Checklist / Risks & Benefits:  Pre-op Checklist  1. Patient seen preoperatively. 2. All patient questions answered. 3. Risks and benefits (including alternative treatments) reviewed. 4. Surgery time and date reviewed. 5. Complications both expected and unexpected discussed  6. Proper prescriptions given. 7. Patient verbalized understanding of the outcome possibilites inherent with this procedure  8.  Photographs taken  Pre-Op Risks and Benefits  bleeding, scar, infection, bruising, swelling, less than aesthetic result, need for additional surgery, hypertrophic scar, wound breakdown, asymmetry, numbness, contour irregularities, hematoma, seroma, DVT, under or over resection, partial skin flap loss, change in nipple sensitivity-temporary or permanent, implant failure, malposition of implant and possible need for drains   Surgical Risks Discussed  Pre-Operative Orders:  Planned Procedure  Bilateral scar revision breasts, bilateral fat grafting to breasts, left capsulorraphy   Pre-Operative Antibiotic Order Selection  Cefazolin 2GM IV piggyback (>80kg)   Pre-Operative Orders  SCD's per Anesthesia Guidelines, Pre-Operative Antibiotic Selection, Nothing by mouth after midnight, Labs per Anesthesia, Consent for: and Urine hCG day of surgery   Signature        Chapperone:  Chapperone 2  Signature - H&P - Pre-Op Orders:  Signature (Physician & Date) Pre-Op        Providers  Katharina Barrera  Diagnosis Codes

## 2021-03-31 NOTE — DISCHARGE INSTRUCTIONS
May remove gauze and shower on Friday     Empty Juan Antonio drain twice daily     Ice packs as needed for comfort     Follow up in office next Tuesday with Dr Ulisses Interiano to remove JUAN ANTONIO drain     After general anesthesia or intravenous sedation, for 24 hours or while taking prescription Narcotics:  · Limit your activities  · A responsible adult needs to be with you for the next 24 hours  · Do not drive and operate hazardous machinery  · Do not make important personal or business decisions  · Do not drink alcoholic beverages  · If you have not urinated within 8 hours after discharge, and you are experiencing discomfort from urinary retention, please go to the nearest ED. · If you have sleep apnea and have a CPAP machine, please use it for all naps and sleeping. · Please use caution when taking narcotics and any of your home medications that may cause drowsiness. *  Please give a list of your current medications to your Primary Care Provider. *  Please update this list whenever your medications are discontinued, doses are      changed, or new medications (including over-the-counter products) are added. *  Please carry medication information at all times in case of emergency situations. These are general instructions for a healthy lifestyle:  No smoking/ No tobacco products/ Avoid exposure to second hand smoke  Surgeon General's Warning:  Quitting smoking now greatly reduces serious risk to your health. Obesity, smoking, and sedentary lifestyle greatly increases your risk for illness  A healthy diet, regular physical exercise & weight monitoring are important for maintaining a healthy lifestyle    You may be retaining fluid if you have a history of heart failure or if you experience any of the following symptoms:  Weight gain of 3 pounds or more overnight or 5 pounds in a week, increased swelling in our hands or feet or shortness of breath while lying flat in bed.   Please call your doctor as soon as you notice any of these symptoms; do not wait until your next office visit.

## 2021-03-31 NOTE — ANESTHESIA POSTPROCEDURE EVALUATION
Procedure(s):  LEFT BREAST LUMPECTOMY  LEFT AXILLARY DISSECTION  LEFT BREAST COMPLEX CLOSURE.    general    Anesthesia Post Evaluation      Multimodal analgesia: multimodal analgesia used between 6 hours prior to anesthesia start to PACU discharge  Patient location during evaluation: PACU  Patient participation: complete - patient participated  Level of consciousness: awake and awake and alert  Pain management: adequate  Airway patency: patent  Anesthetic complications: no  Cardiovascular status: acceptable  Respiratory status: acceptable  Hydration status: acceptable  Post anesthesia nausea and vomiting:  controlled      INITIAL Post-op Vital signs:   Vitals Value Taken Time   /66 03/31/21 1451   Temp 36.6 °C (97.8 °F) 03/31/21 1451   Pulse 59 03/31/21 1452   Resp 16 03/31/21 1451   SpO2 100 % 03/31/21 1452   Vitals shown include unvalidated device data.

## 2021-04-01 NOTE — OP NOTES
[address] [facility]  OPERATIVE REPORT    Name:  Alva Fong  MR#:  [id number]  :  1980  ACCOUNT #:  [de-identified]  DATE OF SERVICE:  2021    PREOPERATIVE DIAGNOSIS:  Recurrent left breast cancer. POSTOPERATIVE DIAGNOSIS:  Recurrent left breast cancer. PROCEDURE PERFORMED:  Left breast lumpectomy and left axillary dissection. SURGEON:   Gregorio Burns MD    ASSISTANT:  Drake Gonzalez, certified first assist.    ANESTHESIA:  general.    COMPLICATIONS:  None. SPECIMENS REMOVED:  Left breast mass, left axillary contents. IMPLANTS:  none. ESTIMATED BLOOD LOSS:  10 mL. CONDITION AT COMPLETION:  Stable. INDICATION:  This patient is a 53-year-old white female who had a previous left breast mastectomy. She presented with a palpable mass medial to the nipple of the left breast.  Biopsy confirmed presence of intraductal carcinoma. There is no sign of metastatic disease. Wide local excision of the skin and offending nodule to the implant capsule was recommended along with axillary lymph node sampling with axillary dissection. The risks, benefits and alternatives of these procedures were discussed in detail with the patient and family prior to surgery. They understood the risks, wished to proceed and consent was given. PROCEDURE:  The patient was taken to the operating room where she underwent general endotracheal anesthetic. IV antibiotics were administered at time of anesthesia. Time-out was performed identifying the patient, procedure and location had been previously marked. An elliptical incision was made to give adequate margins to the palpable nodule medial to the left nipple. Wide excision was taken from skin down to the pectoral muscle. Specimen was elevated off of the muscles with electrocautery Bovie and directly beneath the muscle. A portion of muscle fiber was taken along with the mass as a posterior border.   Specimen was marked for orientation with long suture lateral and a short suture superior. Attention was turned to the left axilla where an axillary dissection was performed and a curvilinear incision was made in the axilla following the skin lines. Dissection through the subcutaneous tissue to the axillary contents were performed with electrocautery. Once the axillary contents were entered, the axillary vein was identified superiorly. Axillary contents were taken inferior to the vein and posterior to the subscapularis muscle fascia intact and en bloc. The long thoracic and thoracodorsal nerves were identified and preserved along their course of the dissection. There were no palpable pathologic nodes present within the axilla. Once completed, irrigation was performed. Hemostasis was assured. A 7-flat JUAN ANTONIO drain was positioned in the axillary space and brought out through an inferolateral stab incision, secured to the skin with 0 silk and placed to bulb suction. The breast incision was closed by Dr. Padmaja Gamboa. Please see her dictated note for those portions. The patient tolerated this procedure well with no complications.       MD ROGERIO [id number]  D:  04/01/2021 15:55  T:  04/01/2021 16:43  JOB #:  [id number]

## 2021-04-02 NOTE — OP NOTES
[facility]  OPERATIVE REPORT    Name:  Mercy Mata  MR#:  [id number]  :  1980  ACCOUNT #:  [de-identified]  DATE OF SERVICE:  2021    PREOPERATIVE DIAGNOSIS:  Recurrent left breast cancer. POSTOPERATIVE DIAGNOSIS:  Recurrent left breast cancer. PROCEDURE PERFORMED:  Left breast complex closure. SURGEON:  Gareth Sargent MD    ASSISTANT:  Marbella PATTON    ANESTHESIA:  General.    COMPLICATIONS:  None. SPECIMENS REMOVED:  None. IMPLANTS:  none    ESTIMATED BLOOD LOSS:  Minimal.    INDICATIONS:  The patient presents with a left breast cancer recurrence. PROCEDURE:  This has been treated by her general surgeon, Dr. Alice Sue, who is excising the overlying skin as well as the underlying recurrence. He did this in an elliptical fashion oriented radially at around 9 o'clock. The skin and recurrence were taken full thickness down to the level of muscle. A small cuff of muscle was also taken with the specimen and sent for permanent section. I then irrigated the wound with antibiotic irrigation. The capsule was not visualized and the implant was not visualized. I then closed the patient in three layers with deep 3-0 Vicryl in the subcutaneous layer, another layer in the subdermal layer, and then a running 4-0 Monocryl subcuticular stitch. Some additional tissue was also taken near the nipple-areolar complex for shaping and to help the scarless flap. The entire length of the closure was 7 cm. The wound was then dressed with bacitracin, Xeroform, and gauze. She tolerated the procedure very well. She was escorted to Recovery in stable condition.       France Dakins, MD [id number]  D:  2021 15:06  T:  2021 1:20  JOB #:  [id number]

## 2021-04-12 ENCOUNTER — PATIENT OUTREACH (OUTPATIENT)
Dept: CASE MANAGEMENT | Age: 41
End: 2021-04-12

## 2021-04-12 ENCOUNTER — HOSPITAL ENCOUNTER (OUTPATIENT)
Dept: LAB | Age: 41
Discharge: HOME | End: 2021-04-12
Payer: COMMERCIAL

## 2021-04-12 DIAGNOSIS — Z90.13 S/P BILATERAL MASTECTOMY: ICD-10-CM

## 2021-04-12 DIAGNOSIS — C50.312 MALIGNANT NEOPLASM OF LOWER-INNER QUADRANT OF LEFT BREAST IN FEMALE, ESTROGEN RECEPTOR POSITIVE (HCC): ICD-10-CM

## 2021-04-12 DIAGNOSIS — Z17.0 MALIGNANT NEOPLASM OF LOWER-INNER QUADRANT OF LEFT BREAST IN FEMALE, ESTROGEN RECEPTOR POSITIVE (HCC): ICD-10-CM

## 2021-04-12 LAB
ALBUMIN SERPL-MCNC: 3.9 G/DL (ref 3.5–5)
ALBUMIN/GLOB SERPL: 1 {RATIO} (ref 1.2–3.5)
ALP SERPL-CCNC: 84 U/L (ref 50–136)
ALT SERPL-CCNC: 15 U/L (ref 12–65)
ANION GAP SERPL CALC-SCNC: 8 MMOL/L (ref 7–16)
AST SERPL-CCNC: 9 U/L (ref 15–37)
BASOPHILS # BLD: 0 K/UL (ref 0–0.2)
BASOPHILS NFR BLD: 1 % (ref 0–2)
BILIRUB SERPL-MCNC: 0.8 MG/DL (ref 0.2–1.1)
BUN SERPL-MCNC: 10 MG/DL (ref 6–23)
CALCIUM SERPL-MCNC: 8.8 MG/DL (ref 8.3–10.4)
CANCER AG15-3 SERPL-ACNC: 23.2 U/ML (ref 1–35)
CHLORIDE SERPL-SCNC: 106 MMOL/L (ref 98–107)
CO2 SERPL-SCNC: 24 MMOL/L (ref 21–32)
CREAT SERPL-MCNC: 0.8 MG/DL (ref 0.6–1)
DIFFERENTIAL METHOD BLD: NORMAL
EOSINOPHIL # BLD: 0.3 K/UL (ref 0–0.8)
EOSINOPHIL NFR BLD: 5 % (ref 0.5–7.8)
ERYTHROCYTE [DISTWIDTH] IN BLOOD BY AUTOMATED COUNT: 12.4 % (ref 11.9–14.6)
GLOBULIN SER CALC-MCNC: 3.8 G/DL (ref 2.3–3.5)
GLUCOSE SERPL-MCNC: 87 MG/DL (ref 65–100)
HCT VFR BLD AUTO: 42.1 % (ref 35.8–46.3)
HGB BLD-MCNC: 13.9 G/DL (ref 11.7–15.4)
IMM GRANULOCYTES # BLD AUTO: 0 K/UL (ref 0–0.5)
IMM GRANULOCYTES NFR BLD AUTO: 0 % (ref 0–5)
LYMPHOCYTES # BLD: 1.6 K/UL (ref 0.5–4.6)
LYMPHOCYTES NFR BLD: 29 % (ref 13–44)
MCH RBC QN AUTO: 29.9 PG (ref 26.1–32.9)
MCHC RBC AUTO-ENTMCNC: 33 G/DL (ref 31.4–35)
MCV RBC AUTO: 90.5 FL (ref 79.6–97.8)
MONOCYTES # BLD: 0.4 K/UL (ref 0.1–1.3)
MONOCYTES NFR BLD: 7 % (ref 4–12)
NEUTS SEG # BLD: 3.3 K/UL (ref 1.7–8.2)
NEUTS SEG NFR BLD: 59 % (ref 43–78)
NRBC # BLD: 0 K/UL (ref 0–0.2)
PLATELET # BLD AUTO: 207 K/UL (ref 150–450)
PMV BLD AUTO: 9.8 FL (ref 9.4–12.3)
POTASSIUM SERPL-SCNC: 4 MMOL/L (ref 3.5–5.1)
PROT SERPL-MCNC: 7.7 G/DL (ref 6.3–8.2)
RBC # BLD AUTO: 4.65 M/UL (ref 4.05–5.2)
SODIUM SERPL-SCNC: 138 MMOL/L (ref 136–145)
WBC # BLD AUTO: 5.5 K/UL (ref 4.3–11.1)

## 2021-04-12 PROCEDURE — 85025 COMPLETE CBC W/AUTO DIFF WBC: CPT

## 2021-04-12 PROCEDURE — 80053 COMPREHEN METABOLIC PANEL: CPT

## 2021-04-12 PROCEDURE — 36415 COLL VENOUS BLD VENIPUNCTURE: CPT

## 2021-04-12 PROCEDURE — 86300 IMMUNOASSAY TUMOR CA 15-3: CPT

## 2021-04-12 NOTE — PROGRESS NOTES
4/12/2021 Saw the patient with Dr Anita Molina. She had surgery and lymph nodes were clear. Dr Anita Molina does not feel she will need radiation. Dr Anita Molina discussed moving forward with chemotherapy. She is going to [facility] second opinion on April 30th and she will plan to start chemo on May 3rd. 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 She is interested in cold cap information and I have let her know about the [place]. I have obtained consent for treatment. She cannot take Zofran and we will send in Ativan. We discussed the decadron day before, of and after. Will continue to follow.

## 2021-04-15 ENCOUNTER — HOSPITAL ENCOUNTER (OUTPATIENT)
Dept: INTERVENTIONAL RADIOLOGY/VASCULAR | Age: 41
Discharge: HOME | End: 2021-04-15
Attending: INTERNAL MEDICINE
Payer: COMMERCIAL

## 2021-04-15 VITALS
HEIGHT: 64 IN | HEART RATE: 68 BPM | TEMPERATURE: 97.7 F | SYSTOLIC BLOOD PRESSURE: 122 MMHG | BODY MASS INDEX: 29.02 KG/M2 | RESPIRATION RATE: 16 BRPM | DIASTOLIC BLOOD PRESSURE: 63 MMHG | WEIGHT: 170 LBS | OXYGEN SATURATION: 99 %

## 2021-04-15 DIAGNOSIS — C50.312 MALIGNANT NEOPLASM OF LOWER-INNER QUADRANT OF LEFT BREAST IN FEMALE, ESTROGEN RECEPTOR POSITIVE (HCC): ICD-10-CM

## 2021-04-15 DIAGNOSIS — Z17.0 MALIGNANT NEOPLASM OF LOWER-INNER QUADRANT OF LEFT BREAST IN FEMALE, ESTROGEN RECEPTOR POSITIVE (HCC): ICD-10-CM

## 2021-04-15 PROCEDURE — C1894 INTRO/SHEATH, NON-LASER: HCPCS

## 2021-04-15 PROCEDURE — 77001 FLUOROGUIDE FOR VEIN DEVICE: CPT

## 2021-04-15 PROCEDURE — 77030010507 HC ADH SKN DERMBND J&J -B

## 2021-04-15 PROCEDURE — C1788 PORT, INDWELLING, IMP: HCPCS

## 2021-04-15 PROCEDURE — 74011250636 HC RX REV CODE- 250/636: Performed by: RADIOLOGY

## 2021-04-15 PROCEDURE — 77030003028 HC SUT VCRL J&J -A

## 2021-04-15 PROCEDURE — 74011000250 HC RX REV CODE- 250: Performed by: RADIOLOGY

## 2021-04-15 RX ORDER — MIDAZOLAM HYDROCHLORIDE 1 MG/ML
.5-2 INJECTION, SOLUTION INTRAMUSCULAR; INTRAVENOUS
Status: DISCONTINUED | OUTPATIENT
Start: 2021-04-15 | End: 2021-04-19 | Stop reason: HOSPADM

## 2021-04-15 RX ORDER — HEPARIN SODIUM (PORCINE) LOCK FLUSH IV SOLN 100 UNIT/ML 100 UNIT/ML
500 SOLUTION INTRAVENOUS AS NEEDED
Status: DISCONTINUED | OUTPATIENT
Start: 2021-04-15 | End: 2021-04-19 | Stop reason: HOSPADM

## 2021-04-15 RX ORDER — LIDOCAINE HYDROCHLORIDE 20 MG/ML
2-20 INJECTION, SOLUTION INFILTRATION; PERINEURAL
Status: DISCONTINUED | OUTPATIENT
Start: 2021-04-15 | End: 2021-04-19 | Stop reason: HOSPADM

## 2021-04-15 RX ORDER — CEFAZOLIN SODIUM/WATER 2 G/20 ML
2 SYRINGE (ML) INTRAVENOUS ONCE
Status: COMPLETED | OUTPATIENT
Start: 2021-04-15 | End: 2021-04-15

## 2021-04-15 RX ORDER — FENTANYL CITRATE 50 UG/ML
25-100 INJECTION, SOLUTION INTRAMUSCULAR; INTRAVENOUS
Status: DISCONTINUED | OUTPATIENT
Start: 2021-04-15 | End: 2021-04-19 | Stop reason: HOSPADM

## 2021-04-15 RX ORDER — SODIUM CHLORIDE 9 MG/ML
25 INJECTION, SOLUTION INTRAVENOUS ONCE
Status: COMPLETED | OUTPATIENT
Start: 2021-04-15 | End: 2021-04-15

## 2021-04-15 RX ADMIN — LIDOCAINE HYDROCHLORIDE 6 ML: 20 INJECTION, SOLUTION INFILTRATION; PERINEURAL at 13:02

## 2021-04-15 RX ADMIN — MIDAZOLAM 1 MG: 1 INJECTION INTRAMUSCULAR; INTRAVENOUS at 12:37

## 2021-04-15 RX ADMIN — FENTANYL CITRATE 50 MCG: 50 INJECTION, SOLUTION INTRAMUSCULAR; INTRAVENOUS at 12:37

## 2021-04-15 RX ADMIN — MIDAZOLAM 1 MG: 1 INJECTION INTRAMUSCULAR; INTRAVENOUS at 12:52

## 2021-04-15 RX ADMIN — HEPARIN SODIUM (PORCINE) LOCK FLUSH IV SOLN 100 UNIT/ML 500 UNITS: 100 SOLUTION at 13:02

## 2021-04-15 RX ADMIN — FENTANYL CITRATE 50 MCG: 50 INJECTION, SOLUTION INTRAMUSCULAR; INTRAVENOUS at 12:52

## 2021-04-15 RX ADMIN — MIDAZOLAM 1 MG: 1 INJECTION INTRAMUSCULAR; INTRAVENOUS at 12:46

## 2021-04-15 RX ADMIN — FENTANYL CITRATE 50 MCG: 50 INJECTION, SOLUTION INTRAMUSCULAR; INTRAVENOUS at 12:46

## 2021-04-15 RX ADMIN — SODIUM CHLORIDE 25 ML/HR: 900 INJECTION, SOLUTION INTRAVENOUS at 12:15

## 2021-04-15 RX ADMIN — CEFAZOLIN 2 G: 10 INJECTION, POWDER, FOR SOLUTION INTRAVENOUS at 12:21

## 2021-04-15 RX ADMIN — LIDOCAINE HYDROCHLORIDE 6 ML: 10; .005 INJECTION, SOLUTION EPIDURAL; INFILTRATION; INTRACAUDAL; PERINEURAL at 13:02

## 2021-04-15 NOTE — PROCEDURES
Department of Interventional Radiology  [phone]        Interventional Radiology Brief Procedure Note    Patient: Joe Joshi MRN: [id number]  SSN: [id number]    YOB: 1980  Age: 46 y.o.   Sex: female      Date of Procedure: 4/15/2021    Pre-Procedure Diagnosis: breast cancer    Post-Procedure Diagnosis: SAME    Procedure(s): Venous Chest Port Placement    Brief Description of Procedure: as above    Performed By: Vale Chang MD     Assistants: None    Anesthesia:Moderate Sedation    Estimated Blood Loss: Less than 10ml    Specimens:  None    Implants:  Chest Port Placement    Findings: patent right IJ    Complications: None    Recommendations: ok to use     Follow Up: as needed    Signed By: Vale Chang MD     April 15, 2021

## 2021-04-15 NOTE — PROGRESS NOTES
TRANSFER - OUT REPORT:    Verbal report given to Berna(name) on Gini Rodgers  being transferred to IR recovery(unit) for routine progression of care       Report consisted of patients Situation, Background, Assessment and   Recommendations(SBAR). Information from the following report(s) SBAR, Procedure Summary and MAR was reviewed with the receiving nurse. Opportunity for questions and clarification was provided. Conscious Sedation:   150 Mcg of Fentanyl administered  3 Mg of Versed administered    Pt tolerated procedure well.      Visit Vitals  /61   Pulse 81   Temp 97.7 °F (36.5 °C)   Resp 15   Ht 5' 4\" (1.626 m)   Wt 77.1 kg (170 lb)   SpO2 100%   Breastfeeding No   BMI 29.18 kg/m²     Past Medical History:   Diagnosis Date    Breast cancer ([facility] [address]     Cancer ([facility] dx--7/28/16    left breast cancer--- surgery and chemo     MRSA infection 2014    LEFT EAR     Peripheral IV 04/15/21 Right Antecubital (Active)   Site Assessment Clean, dry, & intact 04/15/21 1104   Phlebitis Assessment 0 04/15/21 1104   Infiltration Assessment 0 04/15/21 1104   Dressing Status Clean, dry, & intact 04/15/21 1104   Hub Color/Line Status Pink 04/15/21 1104              Venous Access Device PowerPort 04/15/21 Upper chest (subclavicular area, right (Active)

## 2021-04-15 NOTE — PROGRESS NOTES
Prep complete. Patient ready for procedure. Patient declined port education and states that she has had a port previously.

## 2021-04-15 NOTE — DISCHARGE INSTRUCTIONS
[address]  Department of Interventional Radiology  [facility]  [phone] Office  [phone] Fax  Implanted Port Discharge Instructions      General Instructions:   A port is like an implanted IV. They are usually ordered for patients who will be getting chemotherapy, but can also be used as an IV for long term antibiotics, large amounts of fluids, and/or blood products. Your blood can be drawn from your port for labs also. Those patients who do not have good veins find the ports convenient as they can get the IV they need with one stick. The port can be used long term, and the care is easy. The device is under the skin, and once the skin heals, care is minimal. All that is required is the nurse who accesses the port will need to flush it with heparinized saline after each use. Ports are usually placed in the chest wall, usually on the right side. But they can be place in the arms and in the abdomen. Home Care Instructions: If your port is in your arm, do not allow blood pressure or other IVs to be place in that arm. Do not allow bra straps or any clothing to rub the skin over the port. Do not bathe or swim until the skin has healed and if the port is accessed. Once it is healed, and when the port is not accessed, it is okay to bathe and swim. Restrict yourself to light activity for the first 5 days after getting the port put in, after that, resume normal activity slowly. You may resume your normal diet and medications. Follow-Up Instructions: Please see your oncologist, or whatever physician ordered the port as he/she has requested of you. Call If: You should call your Physician and/or the Radiology Nurse if you notice redness, pus, swelling, or pain from the area of your incision. Call if you should develop a fever. The nurses who access your port will know to call your doctor if the port does not seem to be working properly. You need to tell the nurses who use the port if you should have any pain or swelling at the site during an infusion. To Reach Us: If you have any questions about your procedure, please call the Interventional Radiology department at [phone]. After business hours (5pm) and weekends, call the answering service at [phone] and ask for the Radiologist on call to be paged. Si tiene Preguntas acerca del procedimiento, por favor llame al departamento de Radiología Intervencional al [phone]. Después de horas de oficina (5 pm) y los fines de [place], llamar al Les Brantley al [phone] y pregunte por el Radiologo de Iliana Walton. Interventional Radiology General Nurse Discharge    After general anesthesia or intravenous sedation, for 24 hours or while taking prescription Narcotics:  · Limit your activities  · Do not drive and operate hazardous machinery  · Do not make important personal or business decisions  · Do  not drink alcoholic beverages  · If you have not urinated within 8 hours after discharge, please contact your surgeon on call. * Please give a list of your current medications to your Primary Care Provider. * Please update this list whenever your medications are discontinued, doses are     changed, or new medications (including over-the-counter products) are added. * Please carry medication information at all times in case of emergency situations. These are general instructions for a healthy lifestyle:    No smoking/ No tobacco products/ Avoid exposure to second hand smoke  Surgeon General's Warning:  Quitting smoking now greatly reduces serious risk to your health.     Obesity, smoking, and sedentary lifestyle greatly increases your risk for illness  A healthy diet, regular physical exercise & weight monitoring are important for maintaining a healthy lifestyle    You may be retaining fluid if you have a history of heart failure or if you experience any of the following symptoms:  Weight gain of 3 pounds or more overnight or 5 pounds in a week, increased swelling in our hands or feet or shortness of breath while lying flat in bed. Please call your doctor as soon as you notice any of these symptoms; do not wait until your next office visit. Recognize signs and symptoms of STROKE:  F-face looks uneven    A-arms unable to move or move unevenly    S-speech slurred or non-existent    T-time-call 911 as soon as signs and symptoms begin-DO NOT go       Back to bed or wait to see if you get better-TIME IS BRAIN.         Patient Signature:  Date: 4/15/2021  Discharging Nurse: Sabina Howard

## 2021-04-15 NOTE — H&P
History and Physical    Patient: Benjie Castro MRN: [id number]  SSN: [id number]    YOB: 1980  Age: 46 y.o. Sex: female      Subjective:      Benjie Castro is a 46 y.o. female who has recurrent breast cancer. Presents for port placement. NPO.      Past Medical History:   Diagnosis Date    Breast cancer ([facility] [address]     Cancer ([facility] [address] dx--16    left breast cancer--- surgery and chemo     MRSA infection 2014    LEFT EAR     Past Surgical History:   Procedure Laterality Date    HX BREAST BIOPSY Left 2021    HX BREAST LUMPECTOMY Left 3/31/2021    LEFT BREAST LUMPECTOMY performed by Black Vicente MD at [facility] MAIN OR    HX BREAST RECONSTRUCTION Bilateral 2016    BREAST TISSUE EXPANDER INSERTION AND NIPPLE SPARING/ BILATERAL  performed by Derrick Mcintosh MD at [address] [place] [address] HX BREAST RECONSTRUCTION Bilateral 2017    BILATERAL BREAST TISSUE EXPANDER REMOVAL /PLACEMENT OF PERMANENT IMPLANTS performed by Derrick Mcintosh MD at [address] [place] [address] HX BREAST RECONSTRUCTION Bilateral 2020    BILATERAL REVISION OF BREAST RECONSTRUCTION/ performed by Izzy Fournier MD at [address] [place] [address] HX BREAST RECONSTRUCTION Left 3/31/2021    LEFT BREAST COMPLEX CLOSURE performed by Izzy Fournier MD at [facility] MAIN OR    HX CERVICAL POLYPECTOMY      UTERINE    HX  SECTION  2012    x 1     HX KNEE ARTHROSCOPY Right     HX MASTECTOMY Bilateral 2016    BREAST MASTECTOMY SIMPLE BILATERAL performed by Megan Matias MD at [address] [place] [address] HX ORTHOPAEDIC Right     cyst removed from foot    HX ORTHOPAEDIC Right 08/15/2019    RIGHT KIDNER PROCEDURE CHOICE ANES (Right Foot)    HX OTHER SURGICAL  2019    RIGHT FOOT EXCISION SOFT TISSUE MASS (Right Foot)     HX VASCULAR ACCESS  2016    life port, and removed    HX WISDOM TEETH EXTRACTION        Family History   Problem Relation Age of Onset    Hypertension Mother     Cancer Sister THYROID    Bipolar Disorder Sister     Diabetes Paternal Grandmother     Stroke Paternal Grandmother     Heart Attack Maternal Grandfather     Heart Disease Maternal Grandfather      Social History     Tobacco Use    Smoking status: Never Smoker    Smokeless tobacco: Never Used   Substance Use Topics    Alcohol use: Yes     Alcohol/week: 0.0 standard drinks     Comment: rare      Prior to Admission medications    Medication Sig Start Date End Date Taking? Authorizing Provider   LORazepam (ATIVAN) 1 mg tablet Take 1 Tab by mouth every four (4) hours as needed for Anxiety. Max Daily Amount: 6 mg. As needed for nausea, vomiting or anxiety 4/15/21   Alessandro Valero MD   oxyCODONE IR (ROXICODONE) 5 mg immediate release tablet TAKE 1 2 TABLETS BY MOUTH EVERY 4 6 HOURS 3/24/21   Provider, Historical   lidocaine-prilocaine (EMLA) topical cream Apply  to affected area as needed for Pain. Place over port site 30-45 minutes prior to chemotherapy 4/12/21   Alessandro Valero MD   clindamycin (CLINDAGEL) 1 % topical gel 1 APPLICATION ONCE A DAY TO FACE EXTERNALLY 30 DAYS 2/25/21   Provider, Historical   LEVONORGESTREL (MIRENA IU) by IntraUTERine route. Indications: placed 2013    Provider, Historical        Allergies   Allergen Reactions    Lortab [Hydrocodone-Acetaminophen] Itching    Zofran [Ondansetron Hcl (Pf)] Unknown (comments)     \"severe migraines\"       Review of Systems:  Pertinent items are noted in the History of Present Illness. Objective:     Vitals:    04/15/21 1102   BP: 121/65   Pulse: 66   Resp: 18   Temp: 97.7 °F (36.5 °C)   SpO2: 99%   Weight: 77.1 kg (170 lb)   Height: 5' 4\" (1.626 m)        Physical Exam:  LUNG: clear to auscultation bilaterally  HEART: regular rate and rhythm    Assessment:     Hospital Problems  Date Reviewed: 4/6/2021    None          Plan:     Image guided chest port placement. Moderate sedation.     Signed By: Maria R Cates MD     April 15, 2021

## 2021-05-03 ENCOUNTER — PATIENT OUTREACH (OUTPATIENT)
Dept: CASE MANAGEMENT | Age: 41
End: 2021-05-03

## 2021-05-03 ENCOUNTER — HOSPITAL ENCOUNTER (OUTPATIENT)
Dept: INFUSION THERAPY | Age: 41
Discharge: HOME | End: 2021-05-03
Payer: COMMERCIAL

## 2021-05-03 DIAGNOSIS — C50.312 MALIGNANT NEOPLASM OF LOWER-INNER QUADRANT OF LEFT BREAST IN FEMALE, ESTROGEN RECEPTOR POSITIVE (HCC): ICD-10-CM

## 2021-05-03 DIAGNOSIS — Z17.0 MALIGNANT NEOPLASM OF LOWER-INNER QUADRANT OF LEFT BREAST IN FEMALE, ESTROGEN RECEPTOR POSITIVE (HCC): ICD-10-CM

## 2021-05-03 DIAGNOSIS — Z17.1 MALIGNANT NEOPLASM OF LOWER-INNER QUADRANT OF LEFT BREAST IN FEMALE, ESTROGEN RECEPTOR NEGATIVE (HCC): Primary | ICD-10-CM

## 2021-05-03 DIAGNOSIS — C50.312 MALIGNANT NEOPLASM OF LOWER-INNER QUADRANT OF LEFT BREAST IN FEMALE, ESTROGEN RECEPTOR NEGATIVE (HCC): Primary | ICD-10-CM

## 2021-05-03 LAB
ALBUMIN SERPL-MCNC: 4.1 G/DL (ref 3.5–5)
ALBUMIN/GLOB SERPL: 1.1 {RATIO} (ref 1.2–3.5)
ALP SERPL-CCNC: 83 U/L (ref 50–136)
ALT SERPL-CCNC: 18 U/L (ref 12–65)
ANION GAP SERPL CALC-SCNC: 10 MMOL/L (ref 7–16)
AST SERPL-CCNC: 14 U/L (ref 15–37)
BASOPHILS # BLD: 0 K/UL (ref 0–0.2)
BASOPHILS NFR BLD: 0 % (ref 0–2)
BILIRUB SERPL-MCNC: 1.4 MG/DL (ref 0.2–1.1)
BUN SERPL-MCNC: 9 MG/DL (ref 6–23)
CALCIUM SERPL-MCNC: 9.2 MG/DL (ref 8.3–10.4)
CHLORIDE SERPL-SCNC: 105 MMOL/L (ref 98–107)
CO2 SERPL-SCNC: 21 MMOL/L (ref 21–32)
CREAT SERPL-MCNC: 0.6 MG/DL (ref 0.6–1)
DIFFERENTIAL METHOD BLD: ABNORMAL
EOSINOPHIL # BLD: 0 K/UL (ref 0–0.8)
EOSINOPHIL NFR BLD: 0 % (ref 0.5–7.8)
ERYTHROCYTE [DISTWIDTH] IN BLOOD BY AUTOMATED COUNT: 12.6 % (ref 11.9–14.6)
GLOBULIN SER CALC-MCNC: 3.8 G/DL (ref 2.3–3.5)
GLUCOSE SERPL-MCNC: 160 MG/DL (ref 65–100)
HCT VFR BLD AUTO: 42 % (ref 35.8–46.3)
HGB BLD-MCNC: 14.3 G/DL (ref 11.7–15.4)
IMM GRANULOCYTES # BLD AUTO: 0 K/UL (ref 0–0.5)
IMM GRANULOCYTES NFR BLD AUTO: 0 % (ref 0–5)
LYMPHOCYTES # BLD: 0.6 K/UL (ref 0.5–4.6)
LYMPHOCYTES NFR BLD: 6 % (ref 13–44)
MAGNESIUM SERPL-MCNC: 1.7 MG/DL (ref 1.8–2.4)
MCH RBC QN AUTO: 30 PG (ref 26.1–32.9)
MCHC RBC AUTO-ENTMCNC: 34 G/DL (ref 31.4–35)
MCV RBC AUTO: 88.2 FL (ref 79.6–97.8)
MONOCYTES # BLD: 0 K/UL (ref 0.1–1.3)
MONOCYTES NFR BLD: 0 % (ref 4–12)
NEUTS SEG # BLD: 8.5 K/UL (ref 1.7–8.2)
NEUTS SEG NFR BLD: 93 % (ref 43–78)
NRBC # BLD: 0 K/UL (ref 0–0.2)
PLATELET # BLD AUTO: 210 K/UL (ref 150–450)
PMV BLD AUTO: 10.1 FL (ref 9.4–12.3)
POTASSIUM SERPL-SCNC: 3.8 MMOL/L (ref 3.5–5.1)
PROT SERPL-MCNC: 7.9 G/DL (ref 6.3–8.2)
RBC # BLD AUTO: 4.76 M/UL (ref 4.05–5.2)
SODIUM SERPL-SCNC: 136 MMOL/L (ref 136–145)
WBC # BLD AUTO: 9.2 K/UL (ref 4.3–11.1)

## 2021-05-03 PROCEDURE — 85025 COMPLETE CBC W/AUTO DIFF WBC: CPT

## 2021-05-03 PROCEDURE — 80053 COMPREHEN METABOLIC PANEL: CPT

## 2021-05-03 PROCEDURE — 96375 TX/PRO/DX INJ NEW DRUG ADDON: CPT

## 2021-05-03 PROCEDURE — 96413 CHEMO IV INFUSION 1 HR: CPT

## 2021-05-03 PROCEDURE — 96367 TX/PROPH/DG ADDL SEQ IV INF: CPT

## 2021-05-03 PROCEDURE — 74011000258 HC RX REV CODE- 258: Performed by: INTERNAL MEDICINE

## 2021-05-03 PROCEDURE — 96417 CHEMO IV INFUS EACH ADDL SEQ: CPT

## 2021-05-03 PROCEDURE — 74011250636 HC RX REV CODE- 250/636: Performed by: INTERNAL MEDICINE

## 2021-05-03 PROCEDURE — 83735 ASSAY OF MAGNESIUM: CPT

## 2021-05-03 PROCEDURE — 96523 IRRIG DRUG DELIVERY DEVICE: CPT

## 2021-05-03 RX ORDER — DIPHENHYDRAMINE HYDROCHLORIDE 50 MG/ML
50 INJECTION, SOLUTION INTRAMUSCULAR; INTRAVENOUS AS NEEDED
Status: ACTIVE | OUTPATIENT
Start: 2021-05-03 | End: 2021-05-03

## 2021-05-03 RX ORDER — DEXAMETHASONE SODIUM PHOSPHATE 100 MG/10ML
10 INJECTION INTRAMUSCULAR; INTRAVENOUS ONCE
Status: COMPLETED | OUTPATIENT
Start: 2021-05-03 | End: 2021-05-03

## 2021-05-03 RX ORDER — SODIUM CHLORIDE 9 MG/ML
10 INJECTION INTRAMUSCULAR; INTRAVENOUS; SUBCUTANEOUS AS NEEDED
Status: ACTIVE | OUTPATIENT
Start: 2021-05-03 | End: 2021-05-03

## 2021-05-03 RX ORDER — SODIUM CHLORIDE 0.9 % (FLUSH) 0.9 %
10 SYRINGE (ML) INJECTION AS NEEDED
Status: ACTIVE | OUTPATIENT
Start: 2021-05-03 | End: 2021-05-03

## 2021-05-03 RX ORDER — HYDROCORTISONE SODIUM SUCCINATE 100 MG/2ML
100 INJECTION, POWDER, FOR SOLUTION INTRAMUSCULAR; INTRAVENOUS AS NEEDED
Status: ACTIVE | OUTPATIENT
Start: 2021-05-03 | End: 2021-05-03

## 2021-05-03 RX ORDER — DIPHENHYDRAMINE HYDROCHLORIDE 50 MG/ML
25 INJECTION, SOLUTION INTRAMUSCULAR; INTRAVENOUS AS NEEDED
Status: ACTIVE | OUTPATIENT
Start: 2021-05-03 | End: 2021-05-03

## 2021-05-03 RX ORDER — EPINEPHRINE 1 MG/ML
0.3 INJECTION, SOLUTION, CONCENTRATE INTRAVENOUS AS NEEDED
Status: ACTIVE | OUTPATIENT
Start: 2021-05-03 | End: 2021-05-03

## 2021-05-03 RX ORDER — SODIUM CHLORIDE 0.9 % (FLUSH) 0.9 %
10 SYRINGE (ML) INJECTION ONCE
Status: COMPLETED | OUTPATIENT
Start: 2021-05-03 | End: 2021-05-03

## 2021-05-03 RX ORDER — ACETAMINOPHEN 325 MG/1
650 TABLET ORAL AS NEEDED
Status: ACTIVE | OUTPATIENT
Start: 2021-05-03 | End: 2021-05-03

## 2021-05-03 RX ORDER — ALBUTEROL SULFATE 0.83 MG/ML
2.5 SOLUTION RESPIRATORY (INHALATION) AS NEEDED
Status: ACTIVE | OUTPATIENT
Start: 2021-05-03 | End: 2021-05-03

## 2021-05-03 RX ORDER — SODIUM CHLORIDE 9 MG/ML
25 INJECTION, SOLUTION INTRAVENOUS CONTINUOUS
Status: ACTIVE | OUTPATIENT
Start: 2021-05-03 | End: 2021-05-03

## 2021-05-03 RX ADMIN — Medication 10 ML: at 07:51

## 2021-05-03 RX ADMIN — DEXAMETHASONE SODIUM PHOSPHATE 10 MG: 10 INJECTION INTRAMUSCULAR; INTRAVENOUS at 09:26

## 2021-05-03 RX ADMIN — Medication 10 ML: at 11:54

## 2021-05-03 RX ADMIN — Medication 10 ML: at 08:50

## 2021-05-03 RX ADMIN — SODIUM CHLORIDE 25 ML/HR: 900 INJECTION, SOLUTION INTRAVENOUS at 08:50

## 2021-05-03 RX ADMIN — CARBOPLATIN 900 MG: 10 INJECTION, SOLUTION INTRAVENOUS at 11:15

## 2021-05-03 RX ADMIN — DOCETAXEL ANHYDROUS 140 MG: 10 INJECTION, SOLUTION INTRAVENOUS at 10:05

## 2021-05-03 RX ADMIN — FOSAPREPITANT 150 MG: 150 INJECTION, POWDER, LYOPHILIZED, FOR SOLUTION INTRAVENOUS at 09:05

## 2021-05-03 NOTE — PROGRESS NOTES
5/3/21 saw pt today with Dr. Denny Templeton for pre chemo cycle 1 TC. See pt instructions for symptom management. Provided opportunity to ask questions and all were discussed. MD Gudelia Ramos visit discussed. Proceed to infusion. Follow up in 3 weeks. Encouraged to call with any concerns. Navigation will continue to follow.

## 2021-05-03 NOTE — PROGRESS NOTES
Arrived to the [facility]. Taxotere and Carbo completed. Patient tolerated well. Any issues or concerns during appointment: none. Patient aware of next infusion appointment on 5/4/21 (date) at 4:00pm (time). Discharged ambulatory with spouse.

## 2021-05-03 NOTE — PROGRESS NOTES
Patient arrived to port lab for port access and lab draw   [facility] accessed and labs drawn per protocol   Port remains accessed   Patient discharged from port lab ambulatory

## 2021-05-03 NOTE — ACP (ADVANCE CARE PLANNING)
Clinical Social Work Note  Name: Paco Welsh    : 1980    MRN: [id number]    Date of Service: 5/3/2021    Type of Service: Case Management & Health and Behavior Intervention - Initial Assessment    Length of Service: 15 minutes    Patient Diagnosis:   1. Malignant neoplasm of lower-inner quadrant of left breast in female, estrogen receptor positive [facility])         Referral Source: Infusion RN    Reason for Visit: D1C1    CM Note: Seen patient with her  of 15 years. This is patient's second cancer treatment, first on was 4 years ago. Undersigned provided therapeutic reassurance. Couple has a 6years old daughter. LISW-CP introduced self, presented [employer] and others. At this moment, pt denies any psychosocial and economic needs.  Note: LISW-CP discussed Distress by using NCCN Guidelines for Patients, Distress with patient. Bindu Garsia LISW-CP overviewed relaxation and practiced breathing with the patient. Mini Mental Status Exam: Paco Welsh was dressed properly. No abnormal psychomotor movements observed. Intellectual functioning appeared to be intact. Insight was adequate. Judgment was adequate. Patient did not report suicidal ideations, intent or plans. Speech was coherent. Thought process was clear. Patient did not report homicidal ideations, intent or plans. Patient was oriented to self, place, time and situation. Protective Factors: Current care for physical and mental illness, adequate insight and judgment, family support, cultural and Tenriism beliefs and values that support self-care. Next Steps: LISW-CP gave contact information and encouraged pt to call should any needs arise. Pt verbalized understanding. LISW -CP intends to follow up as needed.       3 most recent [facility] Screens 5/3/2021 2021 2021   Little interest or pleasure in doing things Not at all More than half the days More than half the days   Feeling down, depressed, irritable, or hopeless Not at all Several days Several days   Total Score PHQ 2 0 3 3   Trouble falling or staying asleep, or sleeping too much - Not at all -   Feeling tired or having little energy - Several days -   Poor appetite, weight loss, or overeating - Not at all -   Feeling bad about yourself - or that you are a failure or have let yourself or your family down - Several days -   Trouble concentrating on things such as school, work, reading, or watching TV - Several days -   Moving or speaking so slowly that other people could have noticed; or the opposite being so fidgety that others notice - Not at all -   Thoughts of being better off dead, or hurting yourself in some way - Not at all -   PHQ 9 Score - 6 -           Electronically Signed By:  Charmayne Caddy, LISW

## 2021-05-04 ENCOUNTER — APPOINTMENT (OUTPATIENT)
Dept: INFUSION THERAPY | Age: 41
End: 2021-05-04
Payer: COMMERCIAL

## 2021-05-04 ENCOUNTER — HOSPITAL ENCOUNTER (OUTPATIENT)
Dept: INFUSION THERAPY | Age: 41
Discharge: HOME | End: 2021-05-04
Payer: COMMERCIAL

## 2021-05-04 VITALS
SYSTOLIC BLOOD PRESSURE: 111 MMHG | DIASTOLIC BLOOD PRESSURE: 72 MMHG | OXYGEN SATURATION: 100 % | HEART RATE: 81 BPM | TEMPERATURE: 98.1 F | RESPIRATION RATE: 16 BRPM

## 2021-05-04 DIAGNOSIS — Z17.1 MALIGNANT NEOPLASM OF LOWER-INNER QUADRANT OF LEFT BREAST IN FEMALE, ESTROGEN RECEPTOR NEGATIVE (HCC): Primary | ICD-10-CM

## 2021-05-04 DIAGNOSIS — C50.312 MALIGNANT NEOPLASM OF LOWER-INNER QUADRANT OF LEFT BREAST IN FEMALE, ESTROGEN RECEPTOR NEGATIVE (HCC): Primary | ICD-10-CM

## 2021-05-04 PROCEDURE — 74011250636 HC RX REV CODE- 250/636: Performed by: INTERNAL MEDICINE

## 2021-05-04 PROCEDURE — 96372 THER/PROPH/DIAG INJ SC/IM: CPT

## 2021-05-04 RX ADMIN — PEGFILGRASTIM-CBQV 6 MG: 6 INJECTION, SOLUTION SUBCUTANEOUS at 16:30

## 2021-05-04 NOTE — PROGRESS NOTES
Arrived to the [facility]. Udenyca injection completed. Provided education on claritin use while taking udenyca. Patient instructed to report any side affects to ordering provider. Patient tolerated well. Any issues or concerns during appointment: none. Patient aware of next infusion appointment on 5/26 at 9:30am.  Discharged ambulatory to home.

## 2021-05-19 ENCOUNTER — PATIENT OUTREACH (OUTPATIENT)
Dept: CASE MANAGEMENT | Age: 41
End: 2021-05-19

## 2021-05-19 NOTE — PROGRESS NOTES
5/19/2021 Saw the patient with Dr Jaren Scott. She wanted to meet with him as she is having a really difficult time with bone pain. She has muscle relaxer and will try this. We will hold udenyca after next cycle. She didn't feel well with the IV decadron with treatment and we will hold this as long as she is taking the oral day before, of and after. We will send in dicolfenac xr and see if this helps with the bone pain. Encouraged her to call with questions/concerns.

## 2021-05-25 ENCOUNTER — APPOINTMENT (OUTPATIENT)
Dept: INFUSION THERAPY | Age: 41
End: 2021-05-25
Payer: COMMERCIAL

## 2021-05-26 ENCOUNTER — PATIENT OUTREACH (OUTPATIENT)
Dept: CASE MANAGEMENT | Age: 41
End: 2021-05-26

## 2021-05-26 ENCOUNTER — HOSPITAL ENCOUNTER (OUTPATIENT)
Dept: INFUSION THERAPY | Age: 41
Discharge: HOME | End: 2021-05-26
Payer: COMMERCIAL

## 2021-05-26 DIAGNOSIS — Z17.1 MALIGNANT NEOPLASM OF LOWER-INNER QUADRANT OF LEFT BREAST IN FEMALE, ESTROGEN RECEPTOR NEGATIVE (HCC): Primary | ICD-10-CM

## 2021-05-26 DIAGNOSIS — C50.312 MALIGNANT NEOPLASM OF LOWER-INNER QUADRANT OF LEFT BREAST IN FEMALE, ESTROGEN RECEPTOR POSITIVE (HCC): ICD-10-CM

## 2021-05-26 DIAGNOSIS — C50.312 MALIGNANT NEOPLASM OF LOWER-INNER QUADRANT OF LEFT BREAST IN FEMALE, ESTROGEN RECEPTOR NEGATIVE (HCC): Primary | ICD-10-CM

## 2021-05-26 DIAGNOSIS — Z17.0 MALIGNANT NEOPLASM OF LOWER-INNER QUADRANT OF LEFT BREAST IN FEMALE, ESTROGEN RECEPTOR POSITIVE (HCC): ICD-10-CM

## 2021-05-26 LAB
ALBUMIN SERPL-MCNC: 4 G/DL (ref 3.5–5)
ALBUMIN/GLOB SERPL: 1.2 {RATIO} (ref 1.2–3.5)
ALP SERPL-CCNC: 88 U/L (ref 50–136)
ALT SERPL-CCNC: 31 U/L (ref 12–65)
ANION GAP SERPL CALC-SCNC: 8 MMOL/L (ref 7–16)
AST SERPL-CCNC: 9 U/L (ref 15–37)
BASOPHILS # BLD: 0 K/UL (ref 0–0.2)
BASOPHILS NFR BLD: 0 % (ref 0–2)
BILIRUB SERPL-MCNC: 0.6 MG/DL (ref 0.2–1.1)
BUN SERPL-MCNC: 13 MG/DL (ref 6–23)
CALCIUM SERPL-MCNC: 9.1 MG/DL (ref 8.3–10.4)
CHLORIDE SERPL-SCNC: 106 MMOL/L (ref 98–107)
CO2 SERPL-SCNC: 24 MMOL/L (ref 21–32)
CREAT SERPL-MCNC: 0.9 MG/DL (ref 0.6–1)
DIFFERENTIAL METHOD BLD: ABNORMAL
EOSINOPHIL # BLD: 0 K/UL (ref 0–0.8)
EOSINOPHIL NFR BLD: 0 % (ref 0.5–7.8)
ERYTHROCYTE [DISTWIDTH] IN BLOOD BY AUTOMATED COUNT: 13.4 % (ref 11.9–14.6)
GLOBULIN SER CALC-MCNC: 3.3 G/DL (ref 2.3–3.5)
GLUCOSE SERPL-MCNC: 159 MG/DL (ref 65–100)
HCT VFR BLD AUTO: 33.4 % (ref 35.8–46.3)
HGB BLD-MCNC: 11.3 G/DL (ref 11.7–15.4)
IMM GRANULOCYTES # BLD AUTO: 0.1 K/UL (ref 0–0.5)
IMM GRANULOCYTES NFR BLD AUTO: 1 % (ref 0–5)
LYMPHOCYTES # BLD: 1 K/UL (ref 0.5–4.6)
LYMPHOCYTES NFR BLD: 7 % (ref 13–44)
MAGNESIUM SERPL-MCNC: 1.7 MG/DL (ref 1.8–2.4)
MCH RBC QN AUTO: 30.6 PG (ref 26.1–32.9)
MCHC RBC AUTO-ENTMCNC: 33.8 G/DL (ref 31.4–35)
MCV RBC AUTO: 90.5 FL (ref 79.6–97.8)
MONOCYTES # BLD: 0.3 K/UL (ref 0.1–1.3)
MONOCYTES NFR BLD: 2 % (ref 4–12)
NEUTS SEG # BLD: 12.7 K/UL (ref 1.7–8.2)
NEUTS SEG NFR BLD: 90 % (ref 43–78)
NRBC # BLD: 0 K/UL (ref 0–0.2)
PLATELET # BLD AUTO: 268 K/UL (ref 150–450)
PLATELET COMMENTS,PCOM: ADEQUATE
PMV BLD AUTO: 9.7 FL (ref 9.4–12.3)
POTASSIUM SERPL-SCNC: 3.9 MMOL/L (ref 3.5–5.1)
PROT SERPL-MCNC: 7.3 G/DL (ref 6.3–8.2)
RBC # BLD AUTO: 3.69 M/UL (ref 4.05–5.2)
RBC MORPH BLD: ABNORMAL
SODIUM SERPL-SCNC: 138 MMOL/L (ref 136–145)
WBC # BLD AUTO: 14.1 K/UL (ref 4.3–11.1)
WBC MORPH BLD: ABNORMAL

## 2021-05-26 PROCEDURE — 74011000258 HC RX REV CODE- 258: Performed by: INTERNAL MEDICINE

## 2021-05-26 PROCEDURE — 83735 ASSAY OF MAGNESIUM: CPT

## 2021-05-26 PROCEDURE — 74011250636 HC RX REV CODE- 250/636: Performed by: INTERNAL MEDICINE

## 2021-05-26 PROCEDURE — 85025 COMPLETE CBC W/AUTO DIFF WBC: CPT

## 2021-05-26 PROCEDURE — 80053 COMPREHEN METABOLIC PANEL: CPT

## 2021-05-26 PROCEDURE — 96367 TX/PROPH/DG ADDL SEQ IV INF: CPT

## 2021-05-26 PROCEDURE — 36591 DRAW BLOOD OFF VENOUS DEVICE: CPT

## 2021-05-26 PROCEDURE — 96413 CHEMO IV INFUSION 1 HR: CPT

## 2021-05-26 PROCEDURE — 96417 CHEMO IV INFUS EACH ADDL SEQ: CPT

## 2021-05-26 RX ORDER — SODIUM CHLORIDE 0.9 % (FLUSH) 0.9 %
10 SYRINGE (ML) INJECTION AS NEEDED
Status: DISCONTINUED | OUTPATIENT
Start: 2021-05-26 | End: 2021-05-28 | Stop reason: HOSPADM

## 2021-05-26 RX ORDER — SODIUM CHLORIDE 9 MG/ML
25 INJECTION, SOLUTION INTRAVENOUS CONTINUOUS
Status: ACTIVE | OUTPATIENT
Start: 2021-05-26 | End: 2021-05-26

## 2021-05-26 RX ORDER — DIPHENHYDRAMINE HYDROCHLORIDE 50 MG/ML
50 INJECTION, SOLUTION INTRAMUSCULAR; INTRAVENOUS AS NEEDED
Status: ACTIVE | OUTPATIENT
Start: 2021-05-26 | End: 2021-05-26

## 2021-05-26 RX ORDER — DIPHENHYDRAMINE HYDROCHLORIDE 50 MG/ML
25 INJECTION, SOLUTION INTRAMUSCULAR; INTRAVENOUS AS NEEDED
Status: ACTIVE | OUTPATIENT
Start: 2021-05-26 | End: 2021-05-26

## 2021-05-26 RX ORDER — SODIUM CHLORIDE 0.9 % (FLUSH) 0.9 %
10 SYRINGE (ML) INJECTION AS NEEDED
Status: ACTIVE | OUTPATIENT
Start: 2021-05-26 | End: 2021-05-26

## 2021-05-26 RX ADMIN — SODIUM CHLORIDE 25 ML/HR: 9 INJECTION, SOLUTION INTRAVENOUS at 10:21

## 2021-05-26 RX ADMIN — DOCETAXEL ANHYDROUS 140 MG: 10 INJECTION, SOLUTION INTRAVENOUS at 10:28

## 2021-05-26 RX ADMIN — Medication 10 ML: at 12:20

## 2021-05-26 RX ADMIN — FOSAPREPITANT 150 MG: 150 INJECTION, POWDER, LYOPHILIZED, FOR SOLUTION INTRAVENOUS at 10:00

## 2021-05-26 RX ADMIN — Medication 10 ML: at 09:44

## 2021-05-26 RX ADMIN — CARBOPLATIN 767 MG: 10 INJECTION, SOLUTION INTRAVENOUS at 11:40

## 2021-05-26 NOTE — PROGRESS NOTES
5/26/2021 Saw the patient with Dr Sudhir Levy. We will hold the IV steroid this cycle and she has taken her oral steroid this morning. We will hold the udenyca as well. Encouraged her to call with any questions/concerns. She will no longer use the cold cap as it was quite inconvenient and painful and she still was losing hair. Will continue to follow.

## 2021-05-26 NOTE — PROGRESS NOTES
Patient arrived to port lab for port access and lab draw   [facility] accessed and labs drawn per protocol   *Port remains accessed   Patient discharged from port lab ambulatory.   Neeru Turner RN  *

## 2021-05-26 NOTE — PROGRESS NOTES
Pt. Discharged ambulatory accompanied by family. Tolerated chemotherapy well. No distress noted. To call physician with any problems or concerns. Understanding voiced. To return to Infusions on 6/16/21.

## 2021-05-27 ENCOUNTER — APPOINTMENT (OUTPATIENT)
Dept: INFUSION THERAPY | Age: 41
End: 2021-05-27
Payer: COMMERCIAL

## 2021-06-08 ENCOUNTER — PATIENT OUTREACH (OUTPATIENT)
Dept: CASE MANAGEMENT | Age: 41
End: 2021-06-08

## 2021-06-08 NOTE — PROGRESS NOTES
6/8/2021 Saw the patient with Keyon Snow NP. She is here to discuss treatment and her pain. She is having pain in her tailbone and this wraps around both hips. She states today is the first day she hasn't needed Tramadol. Days previously she has been using the Tramadol every 6 hours. She also has had thrush and is using MMW and her mouth is improving. She has had some diarrhea as well and we discussed managing this with Imodium and Lomotil. We will give her oxycodone for pain and see if this will help get her through this next treatment. If she has unmanageable pain with this cycle, we will reduce the dose. Will continue to follow.

## 2021-06-16 ENCOUNTER — HOSPITAL ENCOUNTER (OUTPATIENT)
Dept: INFUSION THERAPY | Age: 41
Discharge: HOME | End: 2021-06-16
Payer: COMMERCIAL

## 2021-06-16 ENCOUNTER — PATIENT OUTREACH (OUTPATIENT)
Dept: CASE MANAGEMENT | Age: 41
End: 2021-06-16

## 2021-06-16 DIAGNOSIS — C50.312 MALIGNANT NEOPLASM OF LOWER-INNER QUADRANT OF LEFT BREAST IN FEMALE, ESTROGEN RECEPTOR NEGATIVE (HCC): Primary | ICD-10-CM

## 2021-06-16 DIAGNOSIS — Z17.1 MALIGNANT NEOPLASM OF LOWER-INNER QUADRANT OF LEFT BREAST IN FEMALE, ESTROGEN RECEPTOR NEGATIVE (HCC): ICD-10-CM

## 2021-06-16 DIAGNOSIS — C50.312 MALIGNANT NEOPLASM OF LOWER-INNER QUADRANT OF LEFT BREAST IN FEMALE, ESTROGEN RECEPTOR NEGATIVE (HCC): ICD-10-CM

## 2021-06-16 DIAGNOSIS — Z17.1 MALIGNANT NEOPLASM OF LOWER-INNER QUADRANT OF LEFT BREAST IN FEMALE, ESTROGEN RECEPTOR NEGATIVE (HCC): Primary | ICD-10-CM

## 2021-06-16 LAB
ALBUMIN SERPL-MCNC: 3.5 G/DL (ref 3.5–5)
ALBUMIN/GLOB SERPL: 1 {RATIO} (ref 1.2–3.5)
ALP SERPL-CCNC: 68 U/L (ref 50–136)
ALT SERPL-CCNC: 29 U/L (ref 12–65)
ANION GAP SERPL CALC-SCNC: 6 MMOL/L (ref 7–16)
AST SERPL-CCNC: 16 U/L (ref 15–37)
BASOPHILS # BLD: 0 K/UL (ref 0–0.2)
BASOPHILS NFR BLD: 0 % (ref 0–2)
BILIRUB SERPL-MCNC: 0.4 MG/DL (ref 0.2–1.1)
BUN SERPL-MCNC: 10 MG/DL (ref 6–23)
CALCIUM SERPL-MCNC: 9 MG/DL (ref 8.3–10.4)
CHLORIDE SERPL-SCNC: 106 MMOL/L (ref 98–107)
CO2 SERPL-SCNC: 24 MMOL/L (ref 21–32)
CREAT SERPL-MCNC: 0.7 MG/DL (ref 0.6–1)
DIFFERENTIAL METHOD BLD: ABNORMAL
EOSINOPHIL # BLD: 0 K/UL (ref 0–0.8)
EOSINOPHIL NFR BLD: 0 % (ref 0.5–7.8)
ERYTHROCYTE [DISTWIDTH] IN BLOOD BY AUTOMATED COUNT: 14.6 % (ref 11.9–14.6)
GLOBULIN SER CALC-MCNC: 3.6 G/DL (ref 2.3–3.5)
GLUCOSE SERPL-MCNC: 176 MG/DL (ref 65–100)
HCT VFR BLD AUTO: 30.3 % (ref 35.8–46.3)
HGB BLD-MCNC: 10.1 G/DL (ref 11.7–15.4)
IMM GRANULOCYTES # BLD AUTO: 0.1 K/UL (ref 0–0.5)
IMM GRANULOCYTES NFR BLD AUTO: 1 % (ref 0–5)
LYMPHOCYTES # BLD: 0.7 K/UL (ref 0.5–4.6)
LYMPHOCYTES NFR BLD: 9 % (ref 13–44)
MAGNESIUM SERPL-MCNC: 1.7 MG/DL (ref 1.8–2.4)
MCH RBC QN AUTO: 29.9 PG (ref 26.1–32.9)
MCHC RBC AUTO-ENTMCNC: 33.3 G/DL (ref 31.4–35)
MCV RBC AUTO: 89.6 FL (ref 79.6–97.8)
MONOCYTES # BLD: 0.1 K/UL (ref 0.1–1.3)
MONOCYTES NFR BLD: 1 % (ref 4–12)
NEUTS SEG # BLD: 6.5 K/UL (ref 1.7–8.2)
NEUTS SEG NFR BLD: 89 % (ref 43–78)
NRBC # BLD: 0 K/UL (ref 0–0.2)
PLATELET # BLD AUTO: 239 K/UL (ref 150–450)
PLATELET COMMENTS,PCOM: ADEQUATE
PMV BLD AUTO: 9.3 FL (ref 9.4–12.3)
POTASSIUM SERPL-SCNC: 4.2 MMOL/L (ref 3.5–5.1)
PROT SERPL-MCNC: 7.1 G/DL (ref 6.3–8.2)
RBC # BLD AUTO: 3.38 M/UL (ref 4.05–5.2)
RBC MORPH BLD: ABNORMAL
SODIUM SERPL-SCNC: 136 MMOL/L (ref 136–145)
WBC # BLD AUTO: 7.4 K/UL (ref 4.3–11.1)
WBC MORPH BLD: ABNORMAL

## 2021-06-16 PROCEDURE — 74011250636 HC RX REV CODE- 250/636: Performed by: NURSE PRACTITIONER

## 2021-06-16 PROCEDURE — 96367 TX/PROPH/DG ADDL SEQ IV INF: CPT

## 2021-06-16 PROCEDURE — 96413 CHEMO IV INFUSION 1 HR: CPT

## 2021-06-16 PROCEDURE — 96375 TX/PRO/DX INJ NEW DRUG ADDON: CPT

## 2021-06-16 PROCEDURE — 96417 CHEMO IV INFUS EACH ADDL SEQ: CPT

## 2021-06-16 PROCEDURE — 80053 COMPREHEN METABOLIC PANEL: CPT

## 2021-06-16 PROCEDURE — 83735 ASSAY OF MAGNESIUM: CPT

## 2021-06-16 PROCEDURE — 74011000258 HC RX REV CODE- 258: Performed by: NURSE PRACTITIONER

## 2021-06-16 PROCEDURE — 36591 DRAW BLOOD OFF VENOUS DEVICE: CPT

## 2021-06-16 PROCEDURE — 85025 COMPLETE CBC W/AUTO DIFF WBC: CPT

## 2021-06-16 RX ORDER — SODIUM CHLORIDE 9 MG/ML
25 INJECTION, SOLUTION INTRAVENOUS CONTINUOUS
Status: ACTIVE | OUTPATIENT
Start: 2021-06-16 | End: 2021-06-16

## 2021-06-16 RX ORDER — SODIUM CHLORIDE 9 MG/ML
10 INJECTION INTRAMUSCULAR; INTRAVENOUS; SUBCUTANEOUS AS NEEDED
Status: ACTIVE | OUTPATIENT
Start: 2021-06-16 | End: 2021-06-16

## 2021-06-16 RX ORDER — SODIUM CHLORIDE 0.9 % (FLUSH) 0.9 %
10 SYRINGE (ML) INJECTION AS NEEDED
Status: DISCONTINUED | OUTPATIENT
Start: 2021-06-16 | End: 2021-06-18 | Stop reason: HOSPADM

## 2021-06-16 RX ORDER — LORAZEPAM 2 MG/ML
1 INJECTION INTRAMUSCULAR ONCE
Status: COMPLETED | OUTPATIENT
Start: 2021-06-16 | End: 2021-06-16

## 2021-06-16 RX ADMIN — FOSAPREPITANT 150 MG: 150 INJECTION, POWDER, LYOPHILIZED, FOR SOLUTION INTRAVENOUS at 11:10

## 2021-06-16 RX ADMIN — Medication 10 ML: at 09:25

## 2021-06-16 RX ADMIN — CARBOPLATIN 900 MG: 10 INJECTION, SOLUTION INTRAVENOUS at 12:54

## 2021-06-16 RX ADMIN — LORAZEPAM 1 MG: 2 INJECTION INTRAMUSCULAR; INTRAVENOUS at 11:00

## 2021-06-16 RX ADMIN — SODIUM CHLORIDE 10 ML: 9 INJECTION INTRAMUSCULAR; INTRAVENOUS; SUBCUTANEOUS at 10:50

## 2021-06-16 RX ADMIN — DOCETAXEL ANHYDROUS 140 MG: 10 INJECTION, SOLUTION INTRAVENOUS at 11:46

## 2021-06-16 RX ADMIN — SODIUM CHLORIDE 10 ML: 9 INJECTION INTRAMUSCULAR; INTRAVENOUS; SUBCUTANEOUS at 13:31

## 2021-06-16 RX ADMIN — SODIUM CHLORIDE 25 ML/HR: 9 INJECTION, SOLUTION INTRAVENOUS at 10:50

## 2021-06-16 NOTE — PROGRESS NOTES
Problem: Chemotherapy Treatment  Goal: *Chemotherapy regimen followed  Outcome: Progressing Towards Goal  Goal: *Hemodynamically stable  Outcome: Progressing Towards Goal  Goal: *Tolerating diet  Outcome: Progressing Towards Goal     Problem: Infection - Risk of, Central Venous Catheter-Associated Bloodstream Infection  Goal: *Absence of infection signs and symptoms  Outcome: Progressing Towards Goal     Problem: Knowledge Deficit  Goal: *Participate in the learning process  Outcome: Progressing Towards Goal  Goal: *Verbalize description of procedure  Outcome: Progressing Towards Goal  Goal: *Verbalizes understanding and describes medication purposes and frequencies  Outcome: Progressing Towards Goal  Goal: *Knowledge of discharge instructions  Outcome: Progressing Towards Goal  Goal: Interventions  Outcome: Progressing Towards Goal

## 2021-06-16 NOTE — PROGRESS NOTES
Patient arrived to port lab for port access and lab draw   [facility] accessed and labs drawn per protocol   *Port remains accessed. Patient discharged from port lab ambulatory. *

## 2021-06-16 NOTE — PROGRESS NOTES
Arrived to the [facility]. Taxotere/cytoxan completed. Patient tolerated well. Any issues or concerns during appointment: 1mg ativan given per orders prior to infusion for treatment related anxiety, tolerated well. Patient aware of next infusion appointment on 7/7 at 10:00am.  Discharged ambulatory to home.

## 2021-06-16 NOTE — PROGRESS NOTES
6/16/2021  Pt seen with Therese NP follow up breast cancer. Pt due for C3 TC today. States having anxiety today due to having treatment, took Ativan at home this morning. States joint aches better, thrush better, appetite good. Having numbness to fingertips, no issues with ADLs, will start B-complex daily. Slight swelling in ankles, will elevate and continue to monitor. Continue to Infusion for treatment. Encouraged to call with any questions/concerns. Navigation will continue to follow.

## 2021-06-17 ENCOUNTER — APPOINTMENT (OUTPATIENT)
Dept: INFUSION THERAPY | Age: 41
End: 2021-06-17

## 2021-06-22 ENCOUNTER — HOSPITAL ENCOUNTER (OUTPATIENT)
Dept: INFUSION THERAPY | Age: 41
Discharge: HOME | End: 2021-06-22
Payer: COMMERCIAL

## 2021-06-22 VITALS
SYSTOLIC BLOOD PRESSURE: 116 MMHG | RESPIRATION RATE: 17 BRPM | OXYGEN SATURATION: 100 % | HEART RATE: 89 BPM | TEMPERATURE: 98.5 F | DIASTOLIC BLOOD PRESSURE: 77 MMHG

## 2021-06-22 DIAGNOSIS — E86.0 DEHYDRATION: Primary | ICD-10-CM

## 2021-06-22 PROCEDURE — 96360 HYDRATION IV INFUSION INIT: CPT

## 2021-06-22 PROCEDURE — 74011250636 HC RX REV CODE- 250/636: Performed by: NURSE PRACTITIONER

## 2021-06-22 RX ORDER — SODIUM CHLORIDE 0.9 % (FLUSH) 0.9 %
10 SYRINGE (ML) INJECTION AS NEEDED
Status: DISCONTINUED | OUTPATIENT
Start: 2021-06-22 | End: 2021-06-24 | Stop reason: HOSPADM

## 2021-06-22 RX ADMIN — SODIUM CHLORIDE 1000 ML: 900 INJECTION, SOLUTION INTRAVENOUS at 14:53

## 2021-06-22 RX ADMIN — Medication 10 ML: at 14:52

## 2021-06-22 RX ADMIN — Medication 10 ML: at 15:53

## 2021-06-22 NOTE — PROGRESS NOTES
Arrived to the [facility]. IVF infusion completed. Provided education on same    Patient instructed to report any side affects to ordering provider. Patient tolerated well. Any issues or concerns during appointment: none. Patient aware of next infusion appointment on 07/07/2021 (date) at 0900 (time). Discharged ambulatory.

## 2021-06-24 ENCOUNTER — HOSPITAL ENCOUNTER (INPATIENT)
Age: 41
LOS: 3 days | Discharge: ANOTHER HOSPITAL | DRG: 871 | End: 2021-06-27
Attending: INTERNAL MEDICINE | Admitting: INTERNAL MEDICINE
Payer: COMMERCIAL

## 2021-06-24 ENCOUNTER — APPOINTMENT (OUTPATIENT)
Dept: GENERAL RADIOLOGY | Age: 41
DRG: 871 | End: 2021-06-24
Attending: NURSE PRACTITIONER
Payer: COMMERCIAL

## 2021-06-24 ENCOUNTER — HOSPITAL ENCOUNTER (OUTPATIENT)
Dept: INFUSION THERAPY | Age: 41
Discharge: HOME | End: 2021-06-24
Payer: COMMERCIAL

## 2021-06-24 ENCOUNTER — HOSPITAL ENCOUNTER (OUTPATIENT)
Dept: LAB | Age: 41
Discharge: HOME | DRG: 871 | End: 2021-06-24
Payer: COMMERCIAL

## 2021-06-24 ENCOUNTER — PATIENT OUTREACH (OUTPATIENT)
Dept: CASE MANAGEMENT | Age: 41
End: 2021-06-24

## 2021-06-24 DIAGNOSIS — D70.9 NEUTROPENIC FEVER (HCC): ICD-10-CM

## 2021-06-24 DIAGNOSIS — R50.9 FEVER, UNSPECIFIED FEVER CAUSE: ICD-10-CM

## 2021-06-24 DIAGNOSIS — E86.0 DEHYDRATION: ICD-10-CM

## 2021-06-24 DIAGNOSIS — C50.312 MALIGNANT NEOPLASM OF LOWER-INNER QUADRANT OF LEFT BREAST IN FEMALE, ESTROGEN RECEPTOR POSITIVE (HCC): ICD-10-CM

## 2021-06-24 DIAGNOSIS — Z17.0 MALIGNANT NEOPLASM OF LOWER-INNER QUADRANT OF LEFT BREAST IN FEMALE, ESTROGEN RECEPTOR POSITIVE (HCC): ICD-10-CM

## 2021-06-24 DIAGNOSIS — R52 PAIN, GENERALIZED: ICD-10-CM

## 2021-06-24 DIAGNOSIS — C50.912 RECURRENT BREAST CANCER, LEFT (HCC): ICD-10-CM

## 2021-06-24 DIAGNOSIS — R50.81 NEUTROPENIC FEVER (HCC): ICD-10-CM

## 2021-06-24 LAB
ALBUMIN SERPL-MCNC: 3.6 G/DL (ref 3.5–5)
ALBUMIN/GLOB SERPL: 1 {RATIO} (ref 1.2–3.5)
ALP SERPL-CCNC: 66 U/L (ref 50–136)
ALT SERPL-CCNC: 37 U/L (ref 12–65)
ANION GAP SERPL CALC-SCNC: 7 MMOL/L (ref 7–16)
APPEARANCE UR: ABNORMAL
AST SERPL-CCNC: 22 U/L (ref 15–37)
BACTERIA URNS QL MICRO: 0 /HPF
BASOPHILS # BLD: 0 K/UL (ref 0–0.2)
BASOPHILS NFR BLD: 2 % (ref 0–2)
BILIRUB SERPL-MCNC: 1.1 MG/DL (ref 0.2–1.1)
BILIRUB UR QL: NEGATIVE
BUN SERPL-MCNC: 9 MG/DL (ref 6–23)
CALCIUM SERPL-MCNC: 8.6 MG/DL (ref 8.3–10.4)
CASTS URNS QL MICRO: 0 /LPF
CHLORIDE SERPL-SCNC: 97 MMOL/L (ref 98–107)
CO2 SERPL-SCNC: 28 MMOL/L (ref 21–32)
COLOR UR: YELLOW
CREAT SERPL-MCNC: 0.7 MG/DL (ref 0.6–1)
CRYSTALS URNS QL MICRO: 0 /LPF
DIFFERENTIAL METHOD BLD: ABNORMAL
EOSINOPHIL # BLD: 0 K/UL (ref 0–0.8)
EOSINOPHIL NFR BLD: 0 % (ref 0.5–7.8)
EPI CELLS #/AREA URNS HPF: ABNORMAL /HPF
ERYTHROCYTE [DISTWIDTH] IN BLOOD BY AUTOMATED COUNT: 14.7 % (ref 11.9–14.6)
GLOBULIN SER CALC-MCNC: 3.7 G/DL (ref 2.3–3.5)
GLUCOSE SERPL-MCNC: 102 MG/DL (ref 65–100)
GLUCOSE UR STRIP.AUTO-MCNC: NEGATIVE MG/DL
HCT VFR BLD AUTO: 27.9 % (ref 35.8–46.3)
HGB BLD-MCNC: 9.6 G/DL (ref 11.7–15.4)
HGB UR QL STRIP: ABNORMAL
IMM GRANULOCYTES # BLD AUTO: 0 K/UL (ref 0–0.5)
IMM GRANULOCYTES NFR BLD AUTO: 0 % (ref 0–5)
KETONES UR QL STRIP.AUTO: NEGATIVE MG/DL
LEUKOCYTE ESTERASE UR QL STRIP.AUTO: NEGATIVE
LYMPHOCYTES # BLD: 0.4 K/UL (ref 0.5–4.6)
LYMPHOCYTES NFR BLD: 73 % (ref 13–44)
MCH RBC QN AUTO: 30.6 PG (ref 26.1–32.9)
MCHC RBC AUTO-ENTMCNC: 34.4 G/DL (ref 31.4–35)
MCV RBC AUTO: 88.9 FL (ref 79.6–97.8)
MONOCYTES # BLD: 0.1 K/UL (ref 0.1–1.3)
MONOCYTES NFR BLD: 26 % (ref 4–12)
MUCOUS THREADS URNS QL MICRO: ABNORMAL /LPF
NEUTS SEG # BLD: 0 K/UL (ref 1.7–8.2)
NEUTS SEG NFR BLD: 0 % (ref 43–78)
NITRITE UR QL STRIP.AUTO: NEGATIVE
NRBC # BLD: 0 K/UL (ref 0–0.2)
OTHER OBSERVATIONS,UCOM: ABNORMAL
PH UR STRIP: 8.5 [PH] (ref 5–9)
PLATELET # BLD AUTO: 109 K/UL (ref 150–450)
PMV BLD AUTO: 9.1 FL (ref 9.4–12.3)
POTASSIUM SERPL-SCNC: 3.3 MMOL/L (ref 3.5–5.1)
PROT SERPL-MCNC: 7.3 G/DL (ref 6.3–8.2)
PROT UR STRIP-MCNC: 30 MG/DL
RBC # BLD AUTO: 3.14 M/UL (ref 4.05–5.2)
RBC #/AREA URNS HPF: ABNORMAL /HPF
SODIUM SERPL-SCNC: 132 MMOL/L (ref 136–145)
SP GR UR REFRACTOMETRY: 1.02 (ref 1–1.02)
UA: UC IF INDICATED,UAUC: ABNORMAL
UROBILINOGEN UR QL STRIP.AUTO: 0.2 EU/DL (ref 0.2–1)
WBC # BLD AUTO: 0.5 K/UL (ref 4.3–11.1)
WBC URNS QL MICRO: ABNORMAL /HPF

## 2021-06-24 PROCEDURE — 74011250636 HC RX REV CODE- 250/636: Performed by: INTERNAL MEDICINE

## 2021-06-24 PROCEDURE — 71046 X-RAY EXAM CHEST 2 VIEWS: CPT

## 2021-06-24 PROCEDURE — 99223 1ST HOSP IP/OBS HIGH 75: CPT | Performed by: INTERNAL MEDICINE

## 2021-06-24 PROCEDURE — 74011000258 HC RX REV CODE- 258: Performed by: NURSE PRACTITIONER

## 2021-06-24 PROCEDURE — 2709999900 HC NON-CHARGEABLE SUPPLY

## 2021-06-24 PROCEDURE — 96365 THER/PROPH/DIAG IV INF INIT: CPT

## 2021-06-24 PROCEDURE — 87086 URINE CULTURE/COLONY COUNT: CPT

## 2021-06-24 PROCEDURE — 74011250637 HC RX REV CODE- 250/637: Performed by: NURSE PRACTITIONER

## 2021-06-24 PROCEDURE — 80053 COMPREHEN METABOLIC PANEL: CPT

## 2021-06-24 PROCEDURE — 85025 COMPLETE CBC W/AUTO DIFF WBC: CPT

## 2021-06-24 PROCEDURE — 65270000029 HC RM PRIVATE

## 2021-06-24 PROCEDURE — 96367 TX/PROPH/DG ADDL SEQ IV INF: CPT

## 2021-06-24 PROCEDURE — 81001 URINALYSIS AUTO W/SCOPE: CPT

## 2021-06-24 PROCEDURE — 74011250636 HC RX REV CODE- 250/636: Performed by: NURSE PRACTITIONER

## 2021-06-24 PROCEDURE — 74011250637 HC RX REV CODE- 250/637: Performed by: INTERNAL MEDICINE

## 2021-06-24 PROCEDURE — 87040 BLOOD CULTURE FOR BACTERIA: CPT

## 2021-06-24 PROCEDURE — 36415 COLL VENOUS BLD VENIPUNCTURE: CPT

## 2021-06-24 PROCEDURE — 96375 TX/PRO/DX INJ NEW DRUG ADDON: CPT

## 2021-06-24 RX ORDER — LORAZEPAM 1 MG/1
1 TABLET ORAL
Status: DISCONTINUED | OUTPATIENT
Start: 2021-06-24 | End: 2021-06-27 | Stop reason: HOSPADM

## 2021-06-24 RX ORDER — POTASSIUM CHLORIDE 20 MEQ/1
20 TABLET, EXTENDED RELEASE ORAL 2 TIMES DAILY
Status: DISCONTINUED | OUTPATIENT
Start: 2021-06-24 | End: 2021-06-27 | Stop reason: HOSPADM

## 2021-06-24 RX ORDER — MORPHINE SULFATE 15 MG/1
15 TABLET ORAL
Status: DISCONTINUED | OUTPATIENT
Start: 2021-06-24 | End: 2021-06-27 | Stop reason: HOSPADM

## 2021-06-24 RX ORDER — VANCOMYCIN HYDROCHLORIDE
1250 EVERY 12 HOURS
Status: DISCONTINUED | OUTPATIENT
Start: 2021-06-24 | End: 2021-06-27 | Stop reason: HOSPADM

## 2021-06-24 RX ORDER — ENOXAPARIN SODIUM 100 MG/ML
40 INJECTION SUBCUTANEOUS EVERY 24 HOURS
Status: DISCONTINUED | OUTPATIENT
Start: 2021-06-24 | End: 2021-06-27 | Stop reason: HOSPADM

## 2021-06-24 RX ORDER — ACETAMINOPHEN 325 MG/1
650 TABLET ORAL
Status: DISCONTINUED | OUTPATIENT
Start: 2021-06-24 | End: 2021-06-27 | Stop reason: HOSPADM

## 2021-06-24 RX ORDER — MORPHINE SULFATE 2 MG/ML
1 INJECTION, SOLUTION INTRAMUSCULAR; INTRAVENOUS ONCE
Status: COMPLETED | OUTPATIENT
Start: 2021-06-24 | End: 2021-06-24

## 2021-06-24 RX ORDER — VANCOMYCIN HYDROCHLORIDE
1250 ONCE
Status: COMPLETED | OUTPATIENT
Start: 2021-06-24 | End: 2021-06-24

## 2021-06-24 RX ORDER — LORATADINE 10 MG/1
10 TABLET ORAL DAILY
Status: DISCONTINUED | OUTPATIENT
Start: 2021-06-24 | End: 2021-06-27 | Stop reason: HOSPADM

## 2021-06-24 RX ADMIN — POTASSIUM CHLORIDE 20 MEQ: 1500 TABLET, EXTENDED RELEASE ORAL at 19:54

## 2021-06-24 RX ADMIN — SODIUM CHLORIDE 75 ML/HR: 900 INJECTION, SOLUTION INTRAVENOUS at 16:05

## 2021-06-24 RX ADMIN — VANCOMYCIN HYDROCHLORIDE 1250 MG: 10 INJECTION, POWDER, LYOPHILIZED, FOR SOLUTION INTRAVENOUS at 12:20

## 2021-06-24 RX ADMIN — TBO-FILGRASTIM 300 MCG: 480 INJECTION, SOLUTION SUBCUTANEOUS at 16:05

## 2021-06-24 RX ADMIN — VANCOMYCIN HYDROCHLORIDE 1250 MG: 10 INJECTION, POWDER, LYOPHILIZED, FOR SOLUTION INTRAVENOUS at 21:18

## 2021-06-24 RX ADMIN — MORPHINE SULFATE 15 MG: 15 TABLET ORAL at 17:11

## 2021-06-24 RX ADMIN — CEFEPIME HYDROCHLORIDE 2 G: 2 INJECTION, POWDER, FOR SOLUTION INTRAVENOUS at 11:43

## 2021-06-24 RX ADMIN — ENOXAPARIN SODIUM 40 MG: 40 INJECTION SUBCUTANEOUS at 16:05

## 2021-06-24 RX ADMIN — SODIUM CHLORIDE 1000 ML: 900 INJECTION, SOLUTION INTRAVENOUS at 11:43

## 2021-06-24 RX ADMIN — MORPHINE SULFATE 1 MG: 2 INJECTION, SOLUTION INTRAMUSCULAR; INTRAVENOUS at 11:40

## 2021-06-24 RX ADMIN — CEFEPIME HYDROCHLORIDE 2 G: 2 INJECTION, POWDER, FOR SOLUTION INTRAVENOUS at 20:05

## 2021-06-24 RX ADMIN — MORPHINE SULFATE 15 MG: 15 TABLET ORAL at 21:24

## 2021-06-24 RX ADMIN — Medication 1 AMPULE: at 21:16

## 2021-06-24 RX ADMIN — ACETAMINOPHEN 650 MG: 325 TABLET, FILM COATED ORAL at 16:04

## 2021-06-24 RX ADMIN — LORATADINE 10 MG: 10 TABLET ORAL at 16:12

## 2021-06-24 NOTE — PROGRESS NOTES
Arrived to the [facility]. Assessment, cefepime/vancomycin and hydration completed. Patient tolerated well. Any issues or concerns during appointment: Pt came to infusion from [facility] to have abx and IVF prior to admission to . Pt refused port access, PIV started and wrapped prior to discharge from infusion per pt request.  Patient aware of next infusion appointment on 07/07/21 @1000. Luiz Mena Discharged via w/c in stable condition to travel to  in personal vehicle.

## 2021-06-24 NOTE — PROGRESS NOTES
Saw patient with Ladarius Bang NP for sick visit for neutropenic fever. Patient states temp was 101.0 prior to taking pain medication with Tylenol. ANC 0.0 and patient present with chills and general malaise. She denies any infectious symptoms including cough, dyspnea, mucositis, dysuria, wounds, etc. No nausea, vomiting. Arranged infusion today for vanc/maxipime and will then send to [address] [place] [address] for admission, [address]. Gilford Houseman will continue to follow. Report called to [address] [facility]. Encouraged to call with questions. Navigation will continue to follow.

## 2021-06-24 NOTE — PROGRESS NOTES
Pharmacokinetic Consult to Pharmacist    Hien Oquendo is a 46 y.o. female being treated for febrile neutropenia with vancomycin and cefepime. Height: 5' 4\" (162.6 cm)  Weight: 73 kg (161 lb)  Lab Results   Component Value Date/Time    BUN 9 06/24/2021 10:25 AM    Creatinine 0.70 06/24/2021 10:25 AM    WBC 0.5 (LL) 06/24/2021 10:25 AM      Estimated Creatinine Clearance: 105.6 mL/min (based on SCr of 0.7 mg/dL). Day 1 of vancomycin. Goal trough is 15-20. Vancomycin dose initiated at 1250 mg IV q12h. Levels will be ordered as clinically indicated. Pharmacy will continue to follow. Please call with any questions.     Thank you,  Jaclyn Thompson, PharmD, [facility]  Clinical Pharmacist  [phone]

## 2021-06-24 NOTE — H&P
[address] Hematology Oncology  Inpatient Hematology / Oncology History and Physical    Reason for Asmission:  neutropenic fever    History of Present Illness:  Ms. Shana Mabry is a 46 y.o. female admitted on June 24, 2021 with a primary diagnosis of neutropenic fever. She has a history of breast cancer originally diagnosed in 2017: 2.6 cm, g2, IDC, ER 2%, VA negative, HER2 negative. S/p bilateral mastectomy and L sentinel node, negative. [id number] (stage IIA). She completed ddAC-wP in March 2017 and tolerated treatment very well. In March 2021, she noted a new nodule in the left breast, MRI imaging showed the mass to be 1.3 cm in size and was suspicious enough that biopsy was recommended. Unfortunately, this confirmed breast cancer, high grade, triple negative. MRI did not show any distant disease. We recommended surgical evaluation to discuss resection. Path reviewed and showed the tumor to be 1.6 cm, 9 lymph nodes were negative ([id number]). Her updated genetics were negative. It was decided to proceed with Taxotere and carboplatin q3w for 6 cycles. She is status post cycle cycle 3 without G-CSF support due to severe pain after cycle 1. She started with fever last night 100.5 and then again this morning up to 101. She called our office and was brought in for work-up. Labs revealed ANC 0.0, WBC 0.5, Hgb 9.6, platelets [phone]. Pan-cultures were obtained and she was taken to our outpatient infusion for urgent antibiotics. She denies any infectious symptoms including cough, dyspnea, mucositis, dysuria, wounds, etc. No nausea, vomiting. She does have some diarrhea this morning after taking stimulants for constipation. Though G-CSF has been omitted, she is still having a lot of generalized pain presently most likely related to Taxotere. She is taking Percocet 10/325 every6 hours without much relief. Review of Systems:  Constitutional Denies chills, weight loss, night sweats.  + fever, fatigue, appetite loss. HEENT Denies trauma, blurry vision, hearing loss, ear pain, nosebleeds, sore throat, neck pain and ear discharge. Skin Denies lesions or rashes. Lungs Denies dyspnea, cough, sputum production or hemoptysis. Cardiovascular Denies chest pain, palpitations, or lower extremity edema. Gastrointestinal Denies nausea, vomiting, changes in bowel habits, bloody or black stools, abdominal pain.  Denies dysuria, frequency or hesitancy of urination. Neuro Denies headaches, visual changes or ataxia. Denies dizziness, tingling, tremors, sensory change, speech change, focal weakness or headaches. Hematology Denies easy bruising or bleeding, denies gingival bleeding or epistaxis. Endo Denies heat/cold intolerance, denies diabetes or thyroid abnormalities. MSK Denies back pain, myalgias or frequent falls. + generalized pain. Psychiatric/Behavioral Denies depression and substance abuse. + nervous/anxious.          Allergies   Allergen Reactions    Lortab [Hydrocodone-Acetaminophen] Itching    Zofran [Ondansetron Hcl (Pf)] Unknown (comments)     \"severe migraines\"     Past Medical History:   Diagnosis Date    [facility]     Cancer ([facility] dx--7/28/16    left breast cancer--- surgery and chemo     MRSA infection 2014    LEFT EAR     Past Surgical History:   Procedure Laterality Date    HX BREAST BIOPSY Left 03/02/2021    HX BREAST LUMPECTOMY Left 3/31/2021    LEFT BREAST LUMPECTOMY performed by Oswaldo Manley MD at [facility] MAIN OR    HX BREAST RECONSTRUCTION Bilateral 8/30/2016    BREAST TISSUE EXPANDER INSERTION AND NIPPLE SPARING/ BILATERAL  performed by Jaqueline Lopes MD at [facility] MAIN OR    HX BREAST RECONSTRUCTION Bilateral 4/5/2017    BILATERAL BREAST TISSUE EXPANDER REMOVAL /PLACEMENT OF PERMANENT IMPLANTS performed by Jaqueline Lopes MD at [address] [place] [address] HX BREAST RECONSTRUCTION Bilateral 7/6/2020    BILATERAL REVISION OF BREAST RECONSTRUCTION/ performed by Clive Everett MD at [address] HX BREAST RECONSTRUCTION Left 3/31/2021    LEFT BREAST COMPLEX CLOSURE performed by Clive Everett MD at [facility] MAIN OR    HX CERVICAL POLYPECTOMY      UTERINE    HX  SECTION  2012    x 1     HX KNEE ARTHROSCOPY Right 2004    HX MASTECTOMY Bilateral 2016    BREAST MASTECTOMY SIMPLE BILATERAL performed by Brinda Bang MD at [address] HX ORTHOPAEDIC Right 2013    cyst removed from foot    HX ORTHOPAEDIC Right 08/15/2019    RIGHT KIDNER PROCEDURE CHOICE ANES (Right Foot)    HX OTHER SURGICAL  2019    RIGHT FOOT EXCISION SOFT TISSUE MASS (Right Foot)     HX VASCULAR ACCESS  2016    life port, and removed    HX WISDOM TEETH EXTRACTION      IR INSERT TUNL CVC W PORT OVER 5 YEARS  4/15/2021     Family History   Problem Relation Age of Onset    Hypertension Mother     Cancer Sister         THYROID    Bipolar Disorder Sister     Diabetes Paternal Grandmother     Stroke Paternal Grandmother     Heart Attack Maternal Grandfather     Heart Disease Maternal Grandfather      Social History     Socioeconomic History    Marital status:      Spouse name: Not on file    Number of children: Not on file    Years of education: Not on file    Highest education level: Not on file   Occupational History    Not on file   Tobacco Use    Smoking status: Never Smoker    Smokeless tobacco: Never Used   Substance and Sexual Activity    Alcohol use: Yes     Alcohol/week: 0.0 standard drinks     Comment: rare    Drug use: No    Sexual activity: Yes     Birth control/protection: I.U.D.    Other Topics Concern    Not on file   Social History Narrative    Not on file     Social Determinants of Health     Financial Resource Strain:     Difficulty of Paying Living Expenses:    Food Insecurity:     Worried About Running Out of Food in the Last Year:     [address] in the Last Year:    Transportation Needs:     Lack of Transportation (Medical):  Lack of Transportation (Non-Medical):    Physical Activity:     Days of Exercise per Week:     Minutes of Exercise per Session:    Stress:     Feeling of Stress :    Social Connections:     Frequency of Communication with Friends and Family:     Frequency of Social Gatherings with Friends and Family:     Attends Yarsani Services:     Active Member of Clubs or Organizations:     Attends Club or Organization Meetings:     Marital Status:    Intimate Partner Violence:     Fear of Current or Ex-Partner:     Emotionally Abused:     Physically Abused:     Sexually Abused:      Current Outpatient Medications   Medication Sig Dispense Refill    diclofenac (VOLTAREN XR) 100 mg ER tablet Take 1 Tablet by mouth daily. (Patient not taking: Reported on 6/16/2021) 30 Tablet 1    dexAMETHasone (Decadron) 4 mg tablet Take 2 tablets twice a day - day before, day of, and day after chemo treatment 72 Tab 0    LORazepam (ATIVAN) 1 mg tablet Take 1 Tab by mouth every four (4) hours as needed for Anxiety. Max Daily Amount: 6 mg. As needed for nausea, vomiting or anxiety 60 Tab 1    lidocaine-prilocaine (EMLA) topical cream Apply  to affected area as needed for Pain. Place over port site 30-45 minutes prior to chemotherapy 30 g 1    clindamycin (CLINDAGEL) 1 % topical gel 1 APPLICATION ONCE A DAY TO FACE EXTERNALLY 30 DAYS      LEVONORGESTREL (MIRENA IU) by IntraUTERine route.  Indications: placed 2013       Facility-Administered Medications Ordered in Other Encounters   Medication Dose Route Frequency Provider Last Rate Last Admin    sodium chloride 0.9 % bolus infusion 1,000 mL  1,000 mL IntraVENous ONCE Elease Even K,  mL/hr at 06/24/21 1143 1,000 mL at 06/24/21 1143    cefepime (MAXIPIME) 2 g in 0.9% sodium chloride (MBP/ADV) 100 mL MBP  2 g IntraVENous Q8H Elease Even K,  mL/hr at 06/24/21 1143 2 g at 06/24/21 1143    vancomycin (VANCOCIN) 1250 mg in  ml infusion  1,250 mg IntraVENous ONCE Berry Navarrete MD           OBJECTIVE:  No data found. Temp (24hrs), Av.8 °F (37.7 °C), Min:99.8 °F (37.7 °C), Max:99.8 °F (37.7 °C)    No intake/output data recorded. Physical Exam:  Constitutional: Well developed, well nourished female in no acute distress, sitting in the hospital bed. Tearful. HEENT: Normocephalic and atraumatic. Oropharynx is clear, mucous membranes are moist. Extraocular muscles are intact. Sclerae anicteric. Neck supple. Skin Warm and dry. No bruising and no rash noted. No erythema. No pallor. Respiratory Lungs are clear to auscultation bilaterally without wheezes, rales or rhonchi, normal air exchange without accessory muscle use. CVS Tachycardic rate, regular rhythm and normal S1 and S2. No murmurs, gallops, or rubs. Abdomen Soft, nontender and nondistended, normoactive bowel sounds. No palpable mass. No hepatosplenomegaly. Neuro Grossly nonfocal with no obvious sensory or motor deficits. MSK Normal range of motion in general.  No edema and no tenderness. Psych Tearful mood and affect. Labs:    Recent Results (from the past 24 hour(s))   CBC WITH AUTOMATED DIFF    Collection Time: 21 10:25 AM   Result Value Ref Range    WBC 0.5 (LL) 4.3 - 11.1 K/uL    RBC 3.14 (L) 4.05 - 5.2 M/uL    HGB 9.6 (L) 11.7 - 15.4 g/dL    HCT 27.9 (L) 35.8 - 46.3 %    MCV 88.9 79.6 - 97.8 FL    MCH 30.6 26.1 - 32.9 PG    MCHC 34.4 31.4 - 35.0 g/dL    RDW 14.7 (H) 11.9 - 14.6 %    PLATELET 547 (L) 198 - 450 K/uL    MPV 9.1 (L) 9.4 - 12.3 FL    ABSOLUTE NRBC 0.00 0.0 - 0.2 K/uL    DF AUTOMATED      NEUTROPHILS 0 (L) 43 - 78 %    LYMPHOCYTES 73 (H) 13 - 44 %    MONOCYTES 26 (H) 4.0 - 12.0 %    EOSINOPHILS 0 (L) 0.5 - 7.8 %    BASOPHILS 2 0.0 - 2.0 %    IMMATURE GRANULOCYTES 0 0.0 - 5.0 %    ABS. NEUTROPHILS 0.0 (L) 1.7 - 8.2 K/UL    ABS. LYMPHOCYTES 0.4 (L) 0.5 - 4.6 K/UL    ABS. MONOCYTES 0.1 0.1 - 1.3 K/UL    ABS.  EOSINOPHILS 0.0 0.0 - 0.8 K/UL    ABS. BASOPHILS 0.0 0.0 - 0.2 K/UL    ABS. IMM. GRANS. 0.0 0.0 - 0.5 K/UL   METABOLIC PANEL, COMPREHENSIVE    Collection Time: 06/24/21 10:25 AM   Result Value Ref Range    Sodium 132 (L) 136 - 145 mmol/L    Potassium 3.3 (L) 3.5 - 5.1 mmol/L    Chloride 97 (L) 98 - 107 mmol/L    CO2 28 21 - 32 mmol/L    Anion gap 7 7 - 16 mmol/L    Glucose 102 (H) 65 - 100 mg/dL    BUN 9 6 - 23 MG/DL    Creatinine 0.70 0.6 - 1.0 MG/DL    GFR est AA >60 >60 ml/min/1.73m2    GFR est non-AA >60 >60 ml/min/1.73m2    Calcium 8.6 8.3 - 10.4 MG/DL    Bilirubin, total 1.1 0.2 - 1.1 MG/DL    ALT (SGPT) 37 12 - 65 U/L    AST (SGOT) 22 15 - 37 U/L    Alk. phosphatase 66 50 - 136 U/L    Protein, total 7.3 6.3 - 8.2 g/dL    Albumin 3.6 3.5 - 5.0 g/dL    Globulin 3.7 (H) 2.3 - 3.5 g/dL    A-G Ratio 1.0 (L) 1.2 - 3.5         Imaging:  Pending     ASSESSMENT:  Problem List  Date Reviewed: 6/16/2021        Codes Class Noted    Pain, generalized ICD-10-CM: R52  ICD-9-CM: 780.96  6/24/2021        * (Principal) Neutropenic fever ([facility] ICD-10-CM: D70.9, R50.81  ICD-9-CM: 288.00, 780.61  6/24/2021        Dehydration ICD-10-CM: E86.0  ICD-9-CM: 276.51  6/22/2021        Recurrent breast cancer, left ([facility] ICD-10-CM: Y54.609  ICD-9-CM: 174.9  3/16/2021        Encounter for IUD removal and reinsertion ICD-10-CM: Z30.433  ICD-9-CM: V25.13  2/16/2018        S/P bilateral mastectomy ICD-10-CM: Z90.13  ICD-9-CM: V45.71  8/31/2016        Malignant neoplasm of lower-inner quadrant of left female breast [facility]) ICD-10-CM: C50.312  ICD-9-CM: 174.3  8/4/2016        Post term pregnancy over 40 weeks ICD-10-CM: O48.0  ICD-9-CM: 645.10  7/24/2012                PLAN:  Breast Cancer  * Status post cycle 3 TC without G-CSF. * Cycle 4 due July 7. Neutropenic Fever  * WBC 0.5, ANC 0.0, Hgb 9.6, platelet [phone]. * Follow pan-cultures, obtain chest xray. * Continue vancomycin/cefepime and follow cultures.   * Start G-CSF daily with Claritin. Generalized Pain   * Likely related to Taxotere. * Oxycodone not effective. * Try Morphine 15 mg every 4 hours PRN. * Should not need narcotics at discharge as Taxotere related and should resolve - when fevers resolve, can utilize anti-inflammatories. Nadya SOP's  DVT prophylaxis with Lovenox        Lab studies and imaging studies were personally reviewed. Pertinent old records were reviewed. Amilcar Jones NP   [address] Hematology Oncology  [address] [address]  Office : [phone]  Fax : [phone]   I personally saw, exammed and counselled the patient, and discussed with NP, agree with above history/assessment/plan. 46 y. o.female triple negative breast cancer status post bilateral mastectomy and adjuvant chemo now had local recurrence status post resection again now on adjuvant TC admitted for neutropenic fever, ANC 0, shivering and appeared ill, broad-spectrum antibiotics with cefepime and vancomycin, Granix daily, monitor abdominal pain and will have CT evaluation if worsening, IV fluid, supportive care per SOP. Tamiko Stack M.D.   [address], [address] [place] [address]  Office : [phone]  Fax : [phone]

## 2021-06-24 NOTE — PROGRESS NOTES
END OF SHIFT NOTE:    Intake/Output  06/24 0701 - 06/24 1900  In: -   Out: 400 [Urine:400]   Voiding: YES  Catheter: NO  Drain:              Stool:  0 occurrences. Emesis:  0 occurrences. VITAL SIGNS  Patient Vitals for the past 12 hrs:   Temp Pulse Resp BP SpO2   06/24/21 1706 99.5 °F (37.5 °C) (!) 113 20 122/65 98 %   06/24/21 1457 (!) 102.7 °F (39.3 °C) (!) 128 24 (!) 141/71 99 %       Pain Assessment  Pain 1  Pain Scale 1: Numeric (0 - 10) (06/24/21 1634)  Pain Intensity 1: 0 (06/24/21 1634)  Patient Stated Pain Goal: 0 (06/24/21 1634)  Pain Location 1: Abdomen (06/24/21 1509)  Pain Orientation 1: Anterior (06/24/21 1509)  Pain Description 1: Cramping (06/24/21 1509)    Ambulating  Yes    Additional Information:   Pt came in today from cancer center. Fever of 102.7 on admission. Tylenol PRN given. Temp is now 99.5. Complained of pain this afternoon. PRN morphine tablet given. Family at bedside. Pt is resting quietly in bed. Shift report given to oncoming nurse at the bedside.     Deisy Mart RN

## 2021-06-25 LAB
ALBUMIN SERPL-MCNC: 2.5 G/DL (ref 3.5–5)
ALBUMIN/GLOB SERPL: 0.8 {RATIO} (ref 1.2–3.5)
ALP SERPL-CCNC: 54 U/L (ref 50–136)
ALT SERPL-CCNC: 30 U/L (ref 12–65)
ANION GAP SERPL CALC-SCNC: 7 MMOL/L (ref 7–16)
AST SERPL-CCNC: 17 U/L (ref 15–37)
BASOPHILS # BLD: 0 K/UL (ref 0–0.2)
BASOPHILS NFR BLD: 1 % (ref 0–2)
BILIRUB SERPL-MCNC: 0.8 MG/DL (ref 0.2–1.1)
BUN SERPL-MCNC: 7 MG/DL (ref 6–23)
CALCIUM SERPL-MCNC: 8.4 MG/DL (ref 8.3–10.4)
CHLORIDE SERPL-SCNC: 104 MMOL/L (ref 98–107)
CO2 SERPL-SCNC: 25 MMOL/L (ref 21–32)
CREAT SERPL-MCNC: 0.51 MG/DL (ref 0.6–1)
DIFFERENTIAL METHOD BLD: ABNORMAL
EOSINOPHIL # BLD: 0 K/UL (ref 0–0.8)
EOSINOPHIL NFR BLD: 1 % (ref 0.5–7.8)
ERYTHROCYTE [DISTWIDTH] IN BLOOD BY AUTOMATED COUNT: 15.1 % (ref 11.9–14.6)
GLOBULIN SER CALC-MCNC: 3.3 G/DL (ref 2.3–3.5)
GLUCOSE SERPL-MCNC: 87 MG/DL (ref 65–100)
HCT VFR BLD AUTO: 20.6 % (ref 35.8–46.3)
HGB BLD-MCNC: 7.1 G/DL (ref 11.7–15.4)
IMM GRANULOCYTES # BLD AUTO: 0 K/UL (ref 0–0.5)
IMM GRANULOCYTES NFR BLD AUTO: 1 % (ref 0–5)
LYMPHOCYTES # BLD: 0.9 K/UL (ref 0.5–4.6)
LYMPHOCYTES NFR BLD: 57 % (ref 13–44)
MAGNESIUM SERPL-MCNC: 1.7 MG/DL (ref 1.8–2.4)
MCH RBC QN AUTO: 30.5 PG (ref 26.1–32.9)
MCHC RBC AUTO-ENTMCNC: 34.5 G/DL (ref 31.4–35)
MCV RBC AUTO: 88.4 FL (ref 79.6–97.8)
MONOCYTES # BLD: 0.3 K/UL (ref 0.1–1.3)
MONOCYTES NFR BLD: 24 % (ref 4–12)
NEUTS SEG # BLD: 0.2 K/UL (ref 1.7–8.2)
NEUTS SEG NFR BLD: 16 % (ref 43–78)
NRBC # BLD: 0 K/UL (ref 0–0.2)
PLATELET # BLD AUTO: 71 K/UL (ref 150–450)
PLATELET COMMENTS,PCOM: ABNORMAL
PMV BLD AUTO: 9.6 FL (ref 9.4–12.3)
POTASSIUM SERPL-SCNC: 3.5 MMOL/L (ref 3.5–5.1)
PROT SERPL-MCNC: 5.8 G/DL (ref 6.3–8.2)
RBC # BLD AUTO: 2.33 M/UL (ref 4.05–5.2)
RBC MORPH BLD: ABNORMAL
RBC MORPH BLD: ABNORMAL
SODIUM SERPL-SCNC: 136 MMOL/L (ref 136–145)
WBC # BLD AUTO: 1.4 K/UL (ref 4.3–11.1)
WBC MORPH BLD: ABNORMAL

## 2021-06-25 PROCEDURE — 74011250636 HC RX REV CODE- 250/636: Performed by: INTERNAL MEDICINE

## 2021-06-25 PROCEDURE — 80053 COMPREHEN METABOLIC PANEL: CPT

## 2021-06-25 PROCEDURE — 83735 ASSAY OF MAGNESIUM: CPT

## 2021-06-25 PROCEDURE — 74011000258 HC RX REV CODE- 258: Performed by: NURSE PRACTITIONER

## 2021-06-25 PROCEDURE — 74011250637 HC RX REV CODE- 250/637: Performed by: NURSE PRACTITIONER

## 2021-06-25 PROCEDURE — 74011250637 HC RX REV CODE- 250/637: Performed by: INTERNAL MEDICINE

## 2021-06-25 PROCEDURE — 85025 COMPLETE CBC W/AUTO DIFF WBC: CPT

## 2021-06-25 PROCEDURE — 36415 COLL VENOUS BLD VENIPUNCTURE: CPT

## 2021-06-25 PROCEDURE — 74011250636 HC RX REV CODE- 250/636: Performed by: NURSE PRACTITIONER

## 2021-06-25 PROCEDURE — 65270000029 HC RM PRIVATE

## 2021-06-25 PROCEDURE — 99232 SBSQ HOSP IP/OBS MODERATE 35: CPT | Performed by: INTERNAL MEDICINE

## 2021-06-25 PROCEDURE — 2709999900 HC NON-CHARGEABLE SUPPLY

## 2021-06-25 PROCEDURE — APPSS60 APP SPLIT SHARED TIME 46-60 MINUTES: Performed by: NURSE PRACTITIONER

## 2021-06-25 RX ORDER — DICYCLOMINE HYDROCHLORIDE 20 MG/1
20 TABLET ORAL
Status: DISCONTINUED | OUTPATIENT
Start: 2021-06-25 | End: 2021-06-25

## 2021-06-25 RX ORDER — LANOLIN ALCOHOL/MO/W.PET/CERES
400 CREAM (GRAM) TOPICAL 2 TIMES DAILY
Status: DISCONTINUED | OUTPATIENT
Start: 2021-06-25 | End: 2021-06-27 | Stop reason: HOSPADM

## 2021-06-25 RX ORDER — PANTOPRAZOLE SODIUM 40 MG/1
40 TABLET, DELAYED RELEASE ORAL
Status: DISCONTINUED | OUTPATIENT
Start: 2021-06-25 | End: 2021-06-27 | Stop reason: HOSPADM

## 2021-06-25 RX ORDER — DICYCLOMINE HYDROCHLORIDE 20 MG/1
20 TABLET ORAL
Status: DISCONTINUED | OUTPATIENT
Start: 2021-06-25 | End: 2021-06-27 | Stop reason: HOSPADM

## 2021-06-25 RX ADMIN — Medication 1 AMPULE: at 20:16

## 2021-06-25 RX ADMIN — POTASSIUM CHLORIDE 20 MEQ: 1500 TABLET, EXTENDED RELEASE ORAL at 08:19

## 2021-06-25 RX ADMIN — TBO-FILGRASTIM 300 MCG: 480 INJECTION, SOLUTION SUBCUTANEOUS at 08:19

## 2021-06-25 RX ADMIN — PANTOPRAZOLE SODIUM 40 MG: 40 TABLET, DELAYED RELEASE ORAL at 15:28

## 2021-06-25 RX ADMIN — VANCOMYCIN HYDROCHLORIDE 1250 MG: 10 INJECTION, POWDER, LYOPHILIZED, FOR SOLUTION INTRAVENOUS at 08:24

## 2021-06-25 RX ADMIN — POTASSIUM CHLORIDE 20 MEQ: 1500 TABLET, EXTENDED RELEASE ORAL at 17:38

## 2021-06-25 RX ADMIN — MORPHINE SULFATE 15 MG: 15 TABLET ORAL at 22:20

## 2021-06-25 RX ADMIN — LORATADINE 10 MG: 10 TABLET ORAL at 08:19

## 2021-06-25 RX ADMIN — VANCOMYCIN HYDROCHLORIDE 1250 MG: 10 INJECTION, POWDER, LYOPHILIZED, FOR SOLUTION INTRAVENOUS at 21:11

## 2021-06-25 RX ADMIN — MORPHINE SULFATE 15 MG: 15 TABLET ORAL at 14:08

## 2021-06-25 RX ADMIN — MAGNESIUM OXIDE TAB 400 MG (241.3 MG ELEMENTAL MG) 400 MG: 400 (241.3 MG) TAB at 17:38

## 2021-06-25 RX ADMIN — CEFEPIME HYDROCHLORIDE 2 G: 2 INJECTION, POWDER, FOR SOLUTION INTRAVENOUS at 11:18

## 2021-06-25 RX ADMIN — Medication 1 AMPULE: at 08:19

## 2021-06-25 RX ADMIN — CEFEPIME HYDROCHLORIDE 2 G: 2 INJECTION, POWDER, FOR SOLUTION INTRAVENOUS at 20:16

## 2021-06-25 RX ADMIN — CEFEPIME HYDROCHLORIDE 2 G: 2 INJECTION, POWDER, FOR SOLUTION INTRAVENOUS at 03:18

## 2021-06-25 RX ADMIN — MORPHINE SULFATE 15 MG: 15 TABLET ORAL at 07:28

## 2021-06-25 RX ADMIN — MAGNESIUM OXIDE TAB 400 MG (241.3 MG ELEMENTAL MG) 400 MG: 400 (241.3 MG) TAB at 08:19

## 2021-06-25 RX ADMIN — DICYCLOMINE HYDROCHLORIDE 20 MG: 20 TABLET ORAL at 16:18

## 2021-06-25 NOTE — PROGRESS NOTES
[address] Hematology Oncology  Inpatient Hematology / Oncology Progress Note    Reason for Admission:  Neutropenic fever ([facility] [D70.9, R50.81]    24 Hour Events:  Tmax 102.7, tachycardic  ANC up to 200, con't Granix  On Cef/Vanc for neutropenic fever  States she feels better today  Mother at bedside    Transfusions: None  Replacements: Mg+    ROS:  Constitutional: +fever  CV: Negative for chest pain, palpitations, edema. Respiratory: Negative for dyspnea, cough, wheezing. GI: Negative for nausea, abdominal pain, diarrhea. 10 point review of systems is otherwise negative with the exception of the elements mentioned above in the HPI.        Allergies   Allergen Reactions    Lortab [Hydrocodone-Acetaminophen] Itching    Zofran [Ondansetron Hcl (Pf)] Unknown (comments)     \"severe migraines\"     Past Medical History:   Diagnosis Date    Breast cancer ([facility]     Cancer ([facility] dx--16    left breast cancer--- surgery and chemo     MRSA infection     LEFT EAR     Past Surgical History:   Procedure Laterality Date    HX BREAST BIOPSY Left 2021    HX BREAST LUMPECTOMY Left 3/31/2021    LEFT BREAST LUMPECTOMY performed by Pradip Knox MD at [facility] MAIN OR    HX BREAST RECONSTRUCTION Bilateral 2016    BREAST TISSUE EXPANDER INSERTION AND NIPPLE SPARING/ BILATERAL  performed by James Coon MD at [address] [place] [address] HX BREAST RECONSTRUCTION Bilateral 2017    BILATERAL BREAST TISSUE EXPANDER REMOVAL /PLACEMENT OF PERMANENT IMPLANTS performed by James Coon MD at [address] [place] [address] HX BREAST RECONSTRUCTION Bilateral 2020    BILATERAL REVISION OF BREAST RECONSTRUCTION/ performed by Ace Hess MD at [address] [place] [address] HX BREAST RECONSTRUCTION Left 3/31/2021    LEFT BREAST COMPLEX CLOSURE performed by Ace Hess MD at [facility] MAIN OR    HX CERVICAL POLYPECTOMY      UTERINE    HX  SECTION  2012    x 1     HX KNEE ARTHROSCOPY Right 2004    HX MASTECTOMY Bilateral 8/30/2016    BREAST MASTECTOMY SIMPLE BILATERAL performed by Sherri Amaral MD at [address] HX ORTHOPAEDIC Right 2013    cyst removed from foot    HX ORTHOPAEDIC Right 08/15/2019    RIGHT KIDNER PROCEDURE CHOICE ANES (Right Foot)    HX OTHER SURGICAL  01/14/2019    RIGHT FOOT EXCISION SOFT TISSUE MASS (Right Foot)     HX VASCULAR ACCESS  2016    life port, and removed    HX WISDOM TEETH EXTRACTION      IR INSERT TUNL CVC W PORT OVER 5 YEARS  4/15/2021     Family History   Problem Relation Age of Onset    Hypertension Mother     Cancer Sister         THYROID    Bipolar Disorder Sister     Diabetes Paternal Grandmother     Stroke Paternal Grandmother     Heart Attack Maternal Grandfather     Heart Disease Maternal Grandfather      Social History     Socioeconomic History    Marital status:      Spouse name: Not on file    Number of children: Not on file    Years of education: Not on file    Highest education level: Not on file   Occupational History    Not on file   Tobacco Use    Smoking status: Never Smoker    Smokeless tobacco: Never Used   Substance and Sexual Activity    Alcohol use: Yes     Alcohol/week: 0.0 standard drinks     Comment: rare    Drug use: No    Sexual activity: Yes     Birth control/protection: I.U.D. Other Topics Concern    Not on file   Social History Narrative    Not on file     Social Determinants of Health     Financial Resource Strain:     Difficulty of Paying Living Expenses:    Food Insecurity:     Worried About Running Out of Food in the Last Year:     [address] in the Last Year:    Transportation Needs:     Lack of Transportation (Medical):      Lack of Transportation (Non-Medical):    Physical Activity:     Days of Exercise per Week:     Minutes of Exercise per Session:    Stress:     Feeling of Stress :    Social Connections:     Frequency of Communication with Friends and Family:     Frequency of Social Gatherings with Friends and Family:     Attends [employer] Services:     Active Member of Clubs or Organizations:     Attends Club or Organization Meetings:     Marital Status:    Intimate Partner Violence:     Fear of Current or Ex-Partner:     Emotionally Abused:     Physically Abused:     Sexually Abused:      Current Facility-Administered Medications   Medication Dose Route Frequency Provider Last Rate Last Admin    magnesium oxide (MAG-OX) tablet 400 mg  400 mg Oral BID Ping Holden, NP        LORazepam (ATIVAN) tablet 1 mg  1 mg Oral Q4H PRN Andi De La Cruz NP        morphine IR (MS IR) tablet 15 mg  15 mg Oral Q4H PRN Vallarie Jono NP   15 mg at 06/25/21 0728    enoxaparin (LOVENOX) injection 40 mg  40 mg SubCUTAneous Q24H Araceli VACA, NP   40 mg at 06/24/21 1605    cefepime (MAXIPIME) 2 g in 0.9% sodium chloride (MBP/ADV) 100 mL MBP  2 g IntraVENous Q8H Haja Barone K,  mL/hr at 06/25/21 0318 2 g at 06/25/21 0318    tbo-filgrastim (GRANIX) injection 300 mcg  300 mcg SubCUTAneous DAILY Vallarie Jono NP   300 mcg at 06/24/21 1605    loratadine (CLARITIN) tablet 10 mg  10 mg Oral DAILY Vallarie De La Cruz, NP   10 mg at 06/24/21 1612    alcohol 62% (NOZIN) nasal  1 Ampule  1 Ampule Topical Q12H Hawk Valderrama MD   1 Ampule at 06/24/21 2116    vancomycin (VANCOCIN) 1250 mg in  ml infusion  1,250 mg IntraVENous Q12H Hawk Valderrama .7 mL/hr at 06/24/21 2118 1,250 mg at 06/24/21 2118    acetaminophen (TYLENOL) tablet 650 mg  650 mg Oral Q4H PRN Hawk Valderrama MD   650 mg at 06/24/21 1604    potassium chloride (K-DUR, KLOR-CON) SR tablet 20 mEq  20 mEq Oral BID Hawk Valderrama MD   20 mEq at 06/24/21 1954     Facility-Administered Medications Ordered in Other Encounters   Medication Dose Route Frequency Provider Last Rate Last Admin    cefepime (MAXIPIME) 2 g in 0.9% sodium chloride (MBP/ADV) 100 mL MBP  2 g IntraVENous Q8H Araceli Cline K, NP   IV Completed at 21 1213       OBJECTIVE:  Patient Vitals for the past 8 hrs:   BP Temp Pulse Resp SpO2   21 0733 (!) 142/77 98.8 °F (37.1 °C) (!) 108 16 96 %   21 0344   (!) 112     21 0315 118/61 98.9 °F (37.2 °C) (!) 122 20 97 %     Temp (24hrs), Av.6 °F (37.6 °C), Min:98.1 °F (36.7 °C), Max:102.7 °F (39.3 °C)     0701 -  1900  In: -   Out: 500 [Urine:500]    Physical Exam:  Constitutional: Well developed, well nourished female in no acute distress, sitting in the hospital bed. Tearful. HEENT: Normocephalic and atraumatic. Oropharynx is clear, mucous membranes are moist. Extraocular muscles are intact. Sclerae anicteric. Neck supple. Skin Warm and dry. No bruising and no rash noted. No erythema. No pallor. Respiratory Lungs are clear to auscultation bilaterally without wheezes, rales or rhonchi, normal air exchange without accessory muscle use. CVS Tachycardic rate, regular rhythm and normal S1 and S2. No murmurs, gallops, or rubs. Abdomen Soft, nontender and nondistended, normoactive bowel sounds. No palpable mass. No hepatosplenomegaly. Neuro Grossly nonfocal with no obvious sensory or motor deficits. MSK Normal range of motion in general.  No edema and no tenderness. Psych Tearful mood and affect.         Labs:    Recent Results (from the past 24 hour(s))   URINALYSIS W/ REFLEX CULTURE    Collection Time: 21 10:12 AM    Specimen: Urine   Result Value Ref Range    Color YELLOW      Appearance HAZY      Specific gravity 1.020 1.001 - 1.023      pH (UA) 8.5 5.0 - 9.0      Protein 30 (A) NEG mg/dL    Glucose Negative NEG mg/dL    Ketone Negative NEG mg/dL    Bilirubin Negative NEG      Blood TRACE (A) NEG      Urobilinogen 0.2 0.2 - 1.0 EU/dL    Nitrites Negative NEG      Leukocyte Esterase Negative NEG      WBC 0-3 0 /hpf    RBC 0-3 0 /hpf    Bacteria 0 0 /hpf    UA:UC IF INDICATED CULTURE NOT INDICATED BY UA RESULT      Epithelial cells 3-5 0 /hpf    Casts 0 0 /lpf    Crystals, urine 0 0 /LPF    Mucus 2+ (H) 0 /lpf    Other observations OCC RENAL EPITHELIEL CELLS SEEN    CBC WITH AUTOMATED DIFF    Collection Time: 06/24/21 10:25 AM   Result Value Ref Range    WBC 0.5 (LL) 4.3 - 11.1 K/uL    RBC 3.14 (L) 4.05 - 5.2 M/uL    HGB 9.6 (L) 11.7 - 15.4 g/dL    HCT 27.9 (L) 35.8 - 46.3 %    MCV 88.9 79.6 - 97.8 FL    MCH 30.6 26.1 - 32.9 PG    MCHC 34.4 31.4 - 35.0 g/dL    RDW 14.7 (H) 11.9 - 14.6 %    PLATELET 769 (L) 227 - 450 K/uL    MPV 9.1 (L) 9.4 - 12.3 FL    ABSOLUTE NRBC 0.00 0.0 - 0.2 K/uL    DF AUTOMATED      NEUTROPHILS 0 (L) 43 - 78 %    LYMPHOCYTES 73 (H) 13 - 44 %    MONOCYTES 26 (H) 4.0 - 12.0 %    EOSINOPHILS 0 (L) 0.5 - 7.8 %    BASOPHILS 2 0.0 - 2.0 %    IMMATURE GRANULOCYTES 0 0.0 - 5.0 %    ABS. NEUTROPHILS 0.0 (L) 1.7 - 8.2 K/UL    ABS. LYMPHOCYTES 0.4 (L) 0.5 - 4.6 K/UL    ABS. MONOCYTES 0.1 0.1 - 1.3 K/UL    ABS. EOSINOPHILS 0.0 0.0 - 0.8 K/UL    ABS. BASOPHILS 0.0 0.0 - 0.2 K/UL    ABS. IMM. GRANS. 0.0 0.0 - 0.5 K/UL   METABOLIC PANEL, COMPREHENSIVE    Collection Time: 06/24/21 10:25 AM   Result Value Ref Range    Sodium 132 (L) 136 - 145 mmol/L    Potassium 3.3 (L) 3.5 - 5.1 mmol/L    Chloride 97 (L) 98 - 107 mmol/L    CO2 28 21 - 32 mmol/L    Anion gap 7 7 - 16 mmol/L    Glucose 102 (H) 65 - 100 mg/dL    BUN 9 6 - 23 MG/DL    Creatinine 0.70 0.6 - 1.0 MG/DL    GFR est AA >60 >60 ml/min/1.73m2    GFR est non-AA >60 >60 ml/min/1.73m2    Calcium 8.6 8.3 - 10.4 MG/DL    Bilirubin, total 1.1 0.2 - 1.1 MG/DL    ALT (SGPT) 37 12 - 65 U/L    AST (SGOT) 22 15 - 37 U/L    Alk.  phosphatase 66 50 - 136 U/L    Protein, total 7.3 6.3 - 8.2 g/dL    Albumin 3.6 3.5 - 5.0 g/dL    Globulin 3.7 (H) 2.3 - 3.5 g/dL    A-G Ratio 1.0 (L) 1.2 - 3.5     CULTURE, BLOOD    Collection Time: 06/24/21 10:25 AM    Specimen: Blood    LEFT ANTECUBITAL   Result Value Ref Range    Special Requests: NO SPECIAL REQUESTS      Culture result: NO GROWTH AFTER 19 HOURS     CULTURE, BLOOD    Collection Time: 06/24/21 10:32 AM    Specimen: Blood    RIGHT ANTECUBITAL   Result Value Ref Range    Special Requests: NO SPECIAL REQUESTS      Culture result: NO GROWTH AFTER 19 HOURS     MAGNESIUM    Collection Time: 06/25/21  5:30 AM   Result Value Ref Range    Magnesium 1.7 (L) 1.8 - 2.4 mg/dL   CBC WITH AUTOMATED DIFF    Collection Time: 06/25/21  5:30 AM   Result Value Ref Range    WBC 1.4 (LL) 4.3 - 11.1 K/uL    RBC 2.33 (L) 4.05 - 5.2 M/uL    HGB 7.1 (L) 11.7 - 15.4 g/dL    HCT 20.6 (L) 35.8 - 46.3 %    MCV 88.4 79.6 - 97.8 FL    MCH 30.5 26.1 - 32.9 PG    MCHC 34.5 31.4 - 35.0 g/dL    RDW 15.1 (H) 11.9 - 14.6 %    PLATELET 71 (L) 173 - 450 K/uL    MPV 9.6 9.4 - 12.3 FL    ABSOLUTE NRBC 0.00 0.0 - 0.2 K/uL    NEUTROPHILS 16 (L) 43 - 78 %    LYMPHOCYTES 57 (H) 13 - 44 %    MONOCYTES 24 (H) 4.0 - 12.0 %    EOSINOPHILS 1 0.5 - 7.8 %    BASOPHILS 1 0.0 - 2.0 %    IMMATURE GRANULOCYTES 1 0.0 - 5.0 %    ABS. NEUTROPHILS 0.2 (L) 1.7 - 8.2 K/UL    ABS. LYMPHOCYTES 0.9 0.5 - 4.6 K/UL    ABS. MONOCYTES 0.3 0.1 - 1.3 K/UL    ABS. EOSINOPHILS 0.0 0.0 - 0.8 K/UL    ABS. BASOPHILS 0.0 0.0 - 0.2 K/UL    ABS. IMM. GRANS. 0.0 0.0 - 0.5 K/UL    RBC COMMENTS SLIGHT  ANISOCYTOSIS + POIKILOCYTOSIS        RBC COMMENTS SLIGHT  MICROCYTOSIS        WBC COMMENTS Result Confirmed By Smear      PLATELET COMMENTS DECREASED      DF AUTOMATED     METABOLIC PANEL, COMPREHENSIVE    Collection Time: 06/25/21  5:30 AM   Result Value Ref Range    Sodium 136 136 - 145 mmol/L    Potassium 3.5 3.5 - 5.1 mmol/L    Chloride 104 98 - 107 mmol/L    CO2 25 21 - 32 mmol/L    Anion gap 7 7 - 16 mmol/L    Glucose 87 65 - 100 mg/dL    BUN 7 6 - 23 MG/DL    Creatinine 0.51 (L) 0.6 - 1.0 MG/DL    GFR est AA >60 >60 ml/min/1.73m2    GFR est non-AA >60 >60 ml/min/1.73m2    Calcium 8.4 8.3 - 10.4 MG/DL    Bilirubin, total 0.8 0.2 - 1.1 MG/DL    ALT (SGPT) 30 12 - 65 U/L    AST (SGOT) 17 15 - 37 U/L    Alk.  phosphatase 54 50 - 136 U/L    Protein, total 5.8 (L) 6.3 - 8.2 g/dL    Albumin 2.5 (L) 3.5 - 5.0 g/dL    Globulin 3.3 2.3 - 3.5 g/dL    A-G Ratio 0.8 (L) 1.2 - 3.5         Imaging:  Pending     ASSESSMENT:  Problem List  Date Reviewed: 6/24/2021        Codes Class Noted    Pain, generalized ICD-10-CM: R52  ICD-9-CM: 780.96  6/24/2021        * (Principal) Neutropenic fever ([facility] ICD-10-CM: D70.9, R50.81  ICD-9-CM: 288.00, 780.61  6/24/2021        Dehydration ICD-10-CM: E86.0  ICD-9-CM: 276.51  6/22/2021        Recurrent breast cancer, left ([facility] ICD-10-CM: P97.664  ICD-9-CM: 174.9  3/16/2021        Encounter for IUD removal and reinsertion ICD-10-CM: Z30.433  ICD-9-CM: V25.13  2/16/2018        S/P bilateral mastectomy ICD-10-CM: Z90.13  ICD-9-CM: V45.71  8/31/2016        Malignant neoplasm of lower-inner quadrant of left female breast [facility]) ICD-10-CM: C50.312  ICD-9-CM: 174.3  8/4/2016        Post term pregnancy over 40 weeks ICD-10-CM: O48.0  ICD-9-CM: 645.10  7/24/2012            Ms. Jamison Boothe is a 46 y.o. female admitted on June 24, 2021 with a primary diagnosis of neutropenic fever. She has a history of breast cancer originally diagnosed in 2017: 2.6 cm, g2, IDC, ER 2%, AR negative, HER2 negative. S/p bilateral mastectomy and L sentinel node, negative. [id number] (stage IIA). She completed ddAC-wP in March 2017 and tolerated treatment very well. In March 2021, she noted a new nodule in the left breast, MRI imaging showed the mass to be 1.3 cm in size and was suspicious enough that biopsy was recommended. Unfortunately, this confirmed breast cancer, high grade, triple negative. MRI did not show any distant disease. We recommended surgical evaluation to discuss resection. Path reviewed and showed the tumor to be 1.6 cm, 9 lymph nodes were negative ([id number]). Her updated genetics were negative. It was decided to proceed with Taxotere and carboplatin q3w for 6 cycles.      She is status post cycle cycle 3 without G-CSF support due to severe pain after cycle 1. She started with fever last night 100.5 and then again this morning up to 101. She called our office and was brought in for work-up. Labs revealed ANC 0.0, WBC 0.5, Hgb 9.6, platelets 008,465. Pan-cultures were obtained and she was taken to our outpatient infusion for urgent antibiotics. She denies any infectious symptoms including cough, dyspnea, mucositis, dysuria, wounds, etc. No nausea, vomiting. She does have some diarrhea this morning after taking stimulants for constipation. Though G-CSF has been omitted, she is still having a lot of generalized pain presently most likely related to Taxotere. She is taking Percocet 10/325 every6 hours without much relief. PLAN:  Breast Cancer  * Status post cycle 3 TC without G-CSF on 6/16. * Cycle 4 due July 7. Neutropenic Fever  * WBC 0.5, ANC 0.0, Hgb 9.6, platelet 314,883. * Follow pan-cultures, obtain chest xray. * Continue vancomycin/cefepime and follow cultures. * Start G-CSF daily with Claritin. 6/25 Tmax 102.7. CXR neg. BCx-NGTD. UCx pending. On Cef/Vanc (D2). ANC up to 200. Con't Granix. Generalized Pain   * Likely related to Taxotere. * Oxycodone not effective. * Try Morphine 15 mg every 4 hours PRN. * Should not need narcotics at discharge as Taxotere related and should resolve - when fevers resolve, can utilize anti-inflammatories. Pancytopenia secondary to chemotherapy  - Transfuse prn per Nadya SOPs    Continue home meds  Nadya SOP's  DVT prophylaxis with Lovenox    Goals and plan of care reviewed with the patient. All questions answered to the best of our ability. Disposition:  Anticipate discharge home once counts recover and pt remains afebrile with negative cultures.       Irish Broderick NP   [address] Hematology Oncology  [address] [address]  Office : [phone]  Fax : [phone]   I personally saw, exammed and counselled the patient, and discussed with NP, agree with above history/assessment/plan. 46 y. o.female triple negative breast cancer status post bilateral mastectomy and adjuvant chemo now had local recurrence status post resection again now on adjuvant TC admitted for neutropenic fever, ANC 0, shivering and appeared ill, broad-spectrum antibiotics with cefepime and vancomycin, Granix daily, responded properly and felt much better, abdominal pain resolved and no need for CT evaluation, continue antibiotics/Granix and IV fluid, last fever less than 24 hours ago, supportive care per SOP. Sridhar Kerr M.D.   [address]  Office : [phone]  Fax : [phone]

## 2021-06-25 NOTE — PROGRESS NOTES
Problem: Falls - Risk of  Goal: *Absence of Falls  Description: Document [place] Fall Risk and appropriate interventions in the flowsheet.   Outcome: Progressing Towards Goal  Note: Fall Risk Interventions:            Medication Interventions: Assess postural VS orthostatic hypotension, Patient to call before getting OOB, Teach patient to arise slowly

## 2021-06-25 NOTE — DISCHARGE SUMMARY
[facility] Hematology & Oncology: Inpatient Hematology / Oncology Discharge Summary Note    Patient ID:  Shilpa Garcia  [id number]  85 y.o.  1980    Admit Date: 6/24/2021    Discharge Date: 6/27/2021    Admission Diagnoses: Neutropenic fever ([address] [D70.9, R50.81]    Discharge Diagnoses:  Principal Diagnosis: Neutropenic fever ([address]  Principal Problem:    Neutropenic fever ([address] (6/24/2021)    Active Problems:    Recurrent breast cancer, left ([address] (3/16/2021)      Pain, generalized (6/24/2021)        Hospital Course:  Ms. Reji Marrero is a 46 y.o. female admitted on June 24, 2021 with a primary diagnosis of neutropenic fever.      She has a history of breast cancer originally diagnosed in 2017: 2.6 cm, g2, IDC, ER 2%, [place] negative, HER2 negative.  S/p bilateral mastectomy and L sentinel node, negative.  qT4wQ3US (stage IIA).  She completed ddAC-wP in March 2017 and tolerated treatment very well.  In March 2021, she noted a new nodule in the left breast, MRI imaging showed the mass to be 1.3 cm in size and was suspicious enough that biopsy was recommended.  Unfortunately, this confirmed breast cancer, high grade, triple negative.  MRI did not show any distant disease.  We recommended surgical evaluation to discuss resection.  Path reviewed and showed the tumor to be 1.6 cm, 9 lymph nodes were negative ([id number]).  Her updated genetics were negative. It was decided to proceed with Taxotere and carboplatin q3w for 6 cycles.      She is status post cycle cycle 3 without G-CSF support due to severe pain after cycle 1. She started with fever last night 100.5 and then again this morning up to 101. She called our office and was brought in for work-up. Labs revealed ANC 0.0, WBC 0.5, Hgb 9.6, platelets 930,306. Pan-cultures were obtained and she was taken to our outpatient infusion for urgent antibiotics. She denies any infectious symptoms including cough, dyspnea, mucositis, dysuria, wounds, etc. No nausea, vomiting.  She does have some diarrhea this morning after taking stimulants for constipation. Though G-CSF has been omitted, she is still having a lot of generalized pain presently most likely related to Taxotere. She is taking Percocet 10/325 every6 hours without much relief. Transferring to [facility] for continuation of care. Breast Cancer  * Status post cycle 3 TC without G-CSF on 6/16. * Cycle 4 due July 7. Neutropenic Fever  * WBC 0.5, ANC 0.0, Hgb 9.6, platelet 415,944. * Follow pan-cultures, obtain chest xray. * Continue vancomycin/cefepime and follow cultures. * Start G-CSF daily with Claritin. 6/25 Tmax 102.7. CXR neg. BCx-NGTD. UCx pending. On Cef/Vanc. ANC up to 200. Con't Granix. 6/26 Afebrile. UCX with mixed skin ashleigh. BCx-NGTD. Con't Cef/Vanc (D3). ANC up to 1700. Give one more dose of Granix. Generalized Pain   * Likely related to Taxotere. * Oxycodone not effective. * Try Morphine 15 mg every 4 hours PRN. * Should not need narcotics at discharge as Taxotere related and should resolve - when fevers resolve, can utilize anti-inflammatories.      Pancytopenia secondary to chemotherapy  - Transfuse prn per Nadya SOPs    Consults:  None    Pertinent Diagnostic Studies:   Labs:    Recent Labs     06/27/21  0515 06/26/21  0449 06/25/21  0530   WBC 7.5 3.6* 1.4*   HGB 7.6* 7.4* 7.1*   PLT 48* 65* 71*   ANEU 5.3 1.7 0.2*      Recent Labs     06/27/21  0515 06/26/21  0820 06/26/21  0449 06/25/21  0530    140  --  136   K 3.3* 3.3*  --  3.5   * 105  --  104   CO2 26 28  --  25   * 83  --  87   BUN 6 7  --  7   CREA 0.48* 0.53*  --  0.51*   CA 8.6 8.9  --  8.4   AP 61 59  --  54   TP 5.7* 6.4  --  5.8*   ALB 2.4* 2.7*  --  2.5*   MG 1.6*  --  1.9 1.7*       OBJECTIVE:  Patient Vitals for the past 8 hrs:   BP Temp Pulse Resp SpO2   06/27/21 0627 114/72 98.1 °F (36.7 °C) 85 16 98 %   06/27/21 0236 122/62 97.9 °F (36.6 °C) 98 16 99 %   06/26/21 2318 121/63 98.9 °F (37.2 °C) 88 16 99 %     Temp (24hrs), Av.4 °F (36.9 °C), Min:97.9 °F (36.6 °C), Max:98.9 °F (37.2 °C)    1901 -  0700  In: 4492 [P.O.:600; I.V.:668]  Out: 1100 [Urine:1100]    Physical Exam:  Constitutional: Well developed, well nourished female in no acute distress, sitting comfortably on the hospital bed. HEENT: Normocephalic and atraumatic. Oropharynx is clear, mucous membranes are moist.  Extraocular muscles are intact. Sclerae anicteric. Neck supple without JVD. No thyromegaly present. Skin Warm and dry. No bruising and no rash noted. No erythema. No pallor. Respiratory Lungs are clear to auscultation bilaterally without wheezes, rales or rhonchi, normal air exchange without accessory muscle use. CVS Normal rate, regular rhythm and normal S1 and S2. No murmurs, gallops, or rubs. Abdomen Soft, nontender and nondistended, normoactive bowel sounds. No palpable mass. No hepatosplenomegaly. Neuro Grossly nonfocal with no obvious sensory or motor deficits. MSK Normal range of motion in general.  No edema and no tenderness. Psych Appropriate mood and affect. ASSESSMENT:    Principal Problem:    Neutropenic fever ([address] (2021)    Active Problems:    Recurrent breast cancer, left ([address] (3/16/2021)      Pain, generalized (2021)        DISPOSITION:  Transferring to [facility] for continuation of care. 45 minutes was spent in discharge planning and coordination of care.             Liane Durand NP  [facility] Hematology & Oncology  [address] [address]  Office : [phone]  Fax : [phone]

## 2021-06-26 LAB
ALBUMIN SERPL-MCNC: 2.7 G/DL (ref 3.5–5)
ALBUMIN/GLOB SERPL: 0.7 {RATIO} (ref 1.2–3.5)
ALP SERPL-CCNC: 59 U/L (ref 50–130)
ALT SERPL-CCNC: 35 U/L (ref 12–65)
ANION GAP SERPL CALC-SCNC: 7 MMOL/L (ref 7–16)
AST SERPL-CCNC: 13 U/L (ref 15–37)
BACTERIA SPEC CULT: NORMAL
BILIRUB SERPL-MCNC: 0.6 MG/DL (ref 0.2–1.1)
BUN SERPL-MCNC: 7 MG/DL (ref 6–23)
CALCIUM SERPL-MCNC: 8.9 MG/DL (ref 8.3–10.4)
CHLORIDE SERPL-SCNC: 105 MMOL/L (ref 98–107)
CO2 SERPL-SCNC: 28 MMOL/L (ref 21–32)
CREAT SERPL-MCNC: 0.53 MG/DL (ref 0.6–1)
DIFFERENTIAL METHOD BLD: ABNORMAL
ERYTHROCYTE [DISTWIDTH] IN BLOOD BY AUTOMATED COUNT: 15 % (ref 11.9–14.6)
GLOBULIN SER CALC-MCNC: 3.7 G/DL (ref 2.3–3.5)
GLUCOSE SERPL-MCNC: 83 MG/DL (ref 65–100)
HCT VFR BLD AUTO: 21.8 % (ref 35.8–46.3)
HGB BLD-MCNC: 7.4 G/DL (ref 11.7–15.4)
LYMPHOCYTES # BLD: 1.2 K/UL (ref 0.5–4.6)
LYMPHOCYTES NFR BLD MANUAL: 33 % (ref 16–44)
MAGNESIUM SERPL-MCNC: 1.9 MG/DL (ref 1.8–2.4)
MCH RBC QN AUTO: 30.1 PG (ref 26.1–32.9)
MCHC RBC AUTO-ENTMCNC: 33.9 G/DL (ref 31.4–35)
MCV RBC AUTO: 88.6 FL (ref 79.6–97.8)
METAMYELOCYTES NFR BLD MANUAL: 1 %
MONOCYTES # BLD: 0.7 K/UL (ref 0.1–1.3)
MONOCYTES NFR BLD MANUAL: 19 % (ref 3–9)
NEUTS BAND NFR BLD MANUAL: 6 % (ref 0–6)
NEUTS SEG # BLD: 1.7 K/UL (ref 1.7–8.2)
NEUTS SEG NFR BLD MANUAL: 41 % (ref 47–75)
NRBC # BLD: 0 K/UL (ref 0–0.2)
PLATELET # BLD AUTO: 65 K/UL (ref 150–450)
PLATELET COMMENTS,PCOM: ABNORMAL
PMV BLD AUTO: 10.7 FL (ref 9.4–12.3)
POTASSIUM SERPL-SCNC: 3.3 MMOL/L (ref 3.5–5.1)
PROT SERPL-MCNC: 6.4 G/DL (ref 6.3–8.2)
RBC # BLD AUTO: 2.46 M/UL (ref 4.05–5.2)
RBC MORPH BLD: ABNORMAL
SERVICE CMNT-IMP: NORMAL
SODIUM SERPL-SCNC: 140 MMOL/L (ref 136–145)
VANCOMYCIN TROUGH SERPL-MCNC: 10.9 UG/ML (ref 5–20)
WBC # BLD AUTO: 3.6 K/UL (ref 4.3–11.1)
WBC MORPH BLD: ABNORMAL

## 2021-06-26 PROCEDURE — 74011250637 HC RX REV CODE- 250/637: Performed by: NURSE PRACTITIONER

## 2021-06-26 PROCEDURE — 36415 COLL VENOUS BLD VENIPUNCTURE: CPT

## 2021-06-26 PROCEDURE — 99232 SBSQ HOSP IP/OBS MODERATE 35: CPT | Performed by: INTERNAL MEDICINE

## 2021-06-26 PROCEDURE — 65270000029 HC RM PRIVATE

## 2021-06-26 PROCEDURE — 74011250637 HC RX REV CODE- 250/637: Performed by: INTERNAL MEDICINE

## 2021-06-26 PROCEDURE — 83735 ASSAY OF MAGNESIUM: CPT

## 2021-06-26 PROCEDURE — 74011250636 HC RX REV CODE- 250/636: Performed by: NURSE PRACTITIONER

## 2021-06-26 PROCEDURE — 80053 COMPREHEN METABOLIC PANEL: CPT

## 2021-06-26 PROCEDURE — 85025 COMPLETE CBC W/AUTO DIFF WBC: CPT

## 2021-06-26 PROCEDURE — APPSS60 APP SPLIT SHARED TIME 46-60 MINUTES: Performed by: NURSE PRACTITIONER

## 2021-06-26 PROCEDURE — 74011250636 HC RX REV CODE- 250/636: Performed by: INTERNAL MEDICINE

## 2021-06-26 PROCEDURE — 2709999900 HC NON-CHARGEABLE SUPPLY

## 2021-06-26 PROCEDURE — 80202 ASSAY OF VANCOMYCIN: CPT

## 2021-06-26 PROCEDURE — 74011000258 HC RX REV CODE- 258: Performed by: NURSE PRACTITIONER

## 2021-06-26 RX ORDER — POTASSIUM CHLORIDE 20 MEQ/1
20 TABLET, EXTENDED RELEASE ORAL 2 TIMES DAILY
Status: CANCELLED | OUTPATIENT
Start: 2021-06-26

## 2021-06-26 RX ORDER — LANOLIN ALCOHOL/MO/W.PET/CERES
400 CREAM (GRAM) TOPICAL 2 TIMES DAILY
Status: CANCELLED | OUTPATIENT
Start: 2021-06-26

## 2021-06-26 RX ORDER — LORAZEPAM 1 MG/1
1 TABLET ORAL
Status: CANCELLED | OUTPATIENT
Start: 2021-06-26

## 2021-06-26 RX ORDER — PANTOPRAZOLE SODIUM 40 MG/1
40 TABLET, DELAYED RELEASE ORAL
Status: CANCELLED | OUTPATIENT
Start: 2021-06-27

## 2021-06-26 RX ORDER — ENOXAPARIN SODIUM 100 MG/ML
40 INJECTION SUBCUTANEOUS EVERY 24 HOURS
Status: CANCELLED | OUTPATIENT
Start: 2021-06-26

## 2021-06-26 RX ORDER — VANCOMYCIN HYDROCHLORIDE
1250 EVERY 12 HOURS
Status: CANCELLED | OUTPATIENT
Start: 2021-06-26

## 2021-06-26 RX ORDER — DICYCLOMINE HYDROCHLORIDE 20 MG/1
20 TABLET ORAL
Status: CANCELLED | OUTPATIENT
Start: 2021-06-26

## 2021-06-26 RX ORDER — LORATADINE 10 MG/1
10 TABLET ORAL DAILY
Status: CANCELLED | OUTPATIENT
Start: 2021-06-27

## 2021-06-26 RX ORDER — MORPHINE SULFATE 15 MG/1
15 TABLET ORAL
Status: CANCELLED | OUTPATIENT
Start: 2021-06-26

## 2021-06-26 RX ORDER — ACETAMINOPHEN 325 MG/1
650 TABLET ORAL
Status: CANCELLED | OUTPATIENT
Start: 2021-06-26

## 2021-06-26 RX ADMIN — CEFEPIME HYDROCHLORIDE 2 G: 2 INJECTION, POWDER, FOR SOLUTION INTRAVENOUS at 19:21

## 2021-06-26 RX ADMIN — CEFEPIME HYDROCHLORIDE 2 G: 2 INJECTION, POWDER, FOR SOLUTION INTRAVENOUS at 11:49

## 2021-06-26 RX ADMIN — Medication 1 AMPULE: at 08:52

## 2021-06-26 RX ADMIN — TBO-FILGRASTIM 300 MCG: 480 INJECTION, SOLUTION SUBCUTANEOUS at 08:52

## 2021-06-26 RX ADMIN — MORPHINE SULFATE 15 MG: 15 TABLET ORAL at 12:59

## 2021-06-26 RX ADMIN — POTASSIUM CHLORIDE 20 MEQ: 1500 TABLET, EXTENDED RELEASE ORAL at 08:52

## 2021-06-26 RX ADMIN — LORATADINE 10 MG: 10 TABLET ORAL at 08:51

## 2021-06-26 RX ADMIN — VANCOMYCIN HYDROCHLORIDE 1250 MG: 10 INJECTION, POWDER, LYOPHILIZED, FOR SOLUTION INTRAVENOUS at 17:05

## 2021-06-26 RX ADMIN — POTASSIUM CHLORIDE 20 MEQ: 1500 TABLET, EXTENDED RELEASE ORAL at 17:06

## 2021-06-26 RX ADMIN — MAGNESIUM OXIDE TAB 400 MG (241.3 MG ELEMENTAL MG) 400 MG: 400 (241.3 MG) TAB at 08:52

## 2021-06-26 RX ADMIN — DICYCLOMINE HYDROCHLORIDE 20 MG: 20 TABLET ORAL at 17:05

## 2021-06-26 RX ADMIN — Medication 1 AMPULE: at 20:10

## 2021-06-26 RX ADMIN — VANCOMYCIN HYDROCHLORIDE 1250 MG: 10 INJECTION, POWDER, LYOPHILIZED, FOR SOLUTION INTRAVENOUS at 09:19

## 2021-06-26 RX ADMIN — DICYCLOMINE HYDROCHLORIDE 20 MG: 20 TABLET ORAL at 11:49

## 2021-06-26 RX ADMIN — DICYCLOMINE HYDROCHLORIDE 20 MG: 20 TABLET ORAL at 08:51

## 2021-06-26 RX ADMIN — PANTOPRAZOLE SODIUM 40 MG: 40 TABLET, DELAYED RELEASE ORAL at 08:51

## 2021-06-26 RX ADMIN — CEFEPIME HYDROCHLORIDE 2 G: 2 INJECTION, POWDER, FOR SOLUTION INTRAVENOUS at 03:07

## 2021-06-26 RX ADMIN — MAGNESIUM OXIDE TAB 400 MG (241.3 MG ELEMENTAL MG) 400 MG: 400 (241.3 MG) TAB at 17:06

## 2021-06-26 NOTE — PROGRESS NOTES
Physician Progress Note      PATIENTLonatasha Lassiter  [facility] #:                  [id number]  :                       1980  ADMIT DATE:       2021 2:51 PM  [address] [place] DATE:  RESPONDING  PROVIDER #:        Urban Risk NP LAIRD NP          QUERY TEXT:    Pt admitted with Neutropenic Fever . Pt noted to have Decreased WBC 0.5. If possible, please document in the progress notes and discharge summary if you are evaluating and /or treating any of the following: The medical record reflects the following:  Risk Factors: Neutropenic Fever  Clinical Indicators: Neutropenic Fever, Chemotherapy, WBC 0.5, , RR 24, temp 102.0  Treatment:  Cefepime IV, Vancomycin IV, IVF            Thank you. Nataly Garcia RN CDI, CCS  My office phone [phone]  Options provided:  -- Neutropenic  Sepsis, present on admission  -- Neutropenic Sepsis, present on admission now resolved  -- Neutropenic Sepsis, not present on admission  -- Sepsis was ruled out  -- Other - I will add my own diagnosis  -- Disagree - Not applicable / Not valid  -- Disagree - Clinically unable to determine / Unknown  -- Refer to Clinical Documentation Reviewer    PROVIDER RESPONSE TEXT:    This patient has Neutropenic sepsis which was present on admission.     Query created by: Fredrick Montes De Oca on 2021 3:20 PM      Electronically signed by:  Urban Risk NP [address] NP 2021 7:03 AM

## 2021-06-26 NOTE — PROGRESS NOTES
Pharmacokinetic Consult to Pharmacist    Mariana Chuy is a 46 y.o. female being treated for FN with vancomycin and cefepime. Height: 5' 4\" (162.6 cm)  Weight: 74.7 kg (164 lb 9.6 oz)  Lab Results   Component Value Date/Time    BUN 7 06/26/2021 08:20 AM    Creatinine 0.53 (L) 06/26/2021 08:20 AM    WBC 3.6 (L) 06/26/2021 04:49 AM      Estimated Creatinine Clearance: 106.8 mL/min (A) (by C-G formula based on SCr of 0.53 mg/dL (L)). Lab Results   Component Value Date/Time    Vancomycin,trough 10.9 06/26/2021 08:20 AM       Day 3 of vancomycin. Goal trough is 10-20. Continue 1250 mg q12h. Move next dose forward to 1700. Will continue to follow patient and order levels when clinically indicated.       Thank you,  Jacky Krabbe, PharmD, [address] [place] [address]  Clinical Pharmacist  [phone]

## 2021-06-26 NOTE — PROGRESS NOTES
END OF SHIFT NOTE:    Intake/Output  06/25 1901 - 06/26 0700  In: 301 [P.O.:480; I.V.:393]  Out: 900 [Urine:900]   Voiding: YES  Catheter: NO  Drain:              Stool:  1 occurrences. Stool Assessment  Stool Color: Other (Comment) (not assessed;per pt) (06/25/21 2200)  Stool Appearance: Soft (per pt) (06/25/21 2200)  Stool Amount: Small (per pt) (06/25/21 2200)  Stool Source/Status: Rectum (06/25/21 2200)    Emesis:  0 occurrences. VITAL SIGNS  Patient Vitals for the past 12 hrs:   Temp Pulse Resp BP SpO2   06/26/21 0312 98.5 °F (36.9 °C) (!) 103 16 111/62 98 %   06/25/21 2305 99.2 °F (37.3 °C) (!) 103 18 129/66 99 %   06/25/21 1930 99.1 °F (37.3 °C) (!) 102 18 133/80 98 %   06/25/21 1526 99.4 °F (37.4 °C) (!) 108 18 127/71 98 %       Pain Assessment  Pain 1  Pain Scale 1: Numeric (0 - 10) (06/26/21 0300)  Pain Intensity 1: 0 (06/26/21 0300)  Patient Stated Pain Goal: 0 (06/26/21 0300)  Pain Reassessment 1: Yes (06/25/21 2307)  Pain Onset 1: pya (06/25/21 2220)  Pain Location 1: Back;Hip (06/25/21 2220)  Pain Orientation 1: Lower;Left;Right (06/25/21 2220)  Pain Description 1: Aching (06/25/21 2220)  Pain Intervention(s) 1: Medication (see MAR) (06/25/21 2220)    Ambulating  Yes    Additional Information:   Afebrile overnight. Offered to access port;declined. Shift report given to oncoming nurse RaeRN at the bedside.     Yenifer Naranjo RN

## 2021-06-26 NOTE — PROGRESS NOTES
[address] Hematology Oncology  Inpatient Hematology / Oncology Progress Note    Reason for Admission:  Neutropenic fever ([facility] [D70.9, R50.81]    24 Hour Events:  Afebrile, VSS  ANC up to 1700  On Cef/Vanc for neutropenic fever    Transfusions: None  Replacements: K+    ROS:  Constitutional: Negative for fever. CV: Negative for chest pain, palpitations, edema. Respiratory: Negative for dyspnea, cough, wheezing. GI: Negative for nausea, abdominal pain, diarrhea. 10 point review of systems is otherwise negative with the exception of the elements mentioned above in the HPI.        Allergies   Allergen Reactions    Lortab [Hydrocodone-Acetaminophen] Itching    Zofran [Ondansetron Hcl (Pf)] Unknown (comments)     \"severe migraines\"     Past Medical History:   Diagnosis Date    [facility] ([facility]     Cancer ([facility] dx--16    left breast cancer--- surgery and chemo     MRSA infection     LEFT EAR     Past Surgical History:   Procedure Laterality Date    HX BREAST BIOPSY Left 2021    HX BREAST LUMPECTOMY Left 3/31/2021    LEFT BREAST LUMPECTOMY performed by Nasir Proctor MD at [facility] MAIN OR    HX BREAST RECONSTRUCTION Bilateral 2016    BREAST TISSUE EXPANDER INSERTION AND NIPPLE SPARING/ BILATERAL  performed by Farzad Lozoya MD at [address] [place] [address] HX BREAST RECONSTRUCTION Bilateral 2017    BILATERAL BREAST TISSUE EXPANDER REMOVAL /PLACEMENT OF PERMANENT IMPLANTS performed by Farzad Lozoya MD at [address] [place] [address] HX BREAST RECONSTRUCTION Bilateral 2020    BILATERAL REVISION OF BREAST RECONSTRUCTION/ performed by Roge Rogers MD at [address] [place] [address] HX BREAST RECONSTRUCTION Left 3/31/2021    LEFT BREAST COMPLEX CLOSURE performed by Roge Rogers MD at [facility] MAIN OR    HX CERVICAL POLYPECTOMY      UTERINE    HX  SECTION  2012    x 1     HX KNEE ARTHROSCOPY Right     HX MASTECTOMY Bilateral 2016    BREAST MASTECTOMY SIMPLE BILATERAL performed by Brinda Bang MD at [facility] MAIN OR    HX ORTHOPAEDIC Right 2013    cyst removed from foot    HX ORTHOPAEDIC Right 08/15/2019    RIGHT [facility] PROCEDURE CHOICE ANES (Right Foot)    HX OTHER SURGICAL  01/14/2019    RIGHT FOOT EXCISION SOFT TISSUE MASS (Right Foot)     HX VASCULAR ACCESS  2016    life port, and removed    HX WISDOM TEETH EXTRACTION      IR INSERT TUNL CVC W PORT OVER 5 YEARS  4/15/2021     Family History   Problem Relation Age of Onset    Hypertension Mother     Cancer Sister         THYROID    Bipolar Disorder Sister     Diabetes Paternal Grandmother     Stroke Paternal Grandmother     Heart Attack Maternal Grandfather     Heart Disease Maternal Grandfather      Social History     Socioeconomic History    Marital status:      Spouse name: Not on file    Number of children: Not on file    Years of education: Not on file    Highest education level: Not on file   Occupational History    Not on file   Tobacco Use    Smoking status: Never Smoker    Smokeless tobacco: Never Used   Substance and Sexual Activity    Alcohol use: Yes     Alcohol/week: 0.0 standard drinks     Comment: rare    Drug use: No    Sexual activity: Yes     Birth control/protection: I.U.D. Other Topics Concern    Not on file   Social History Narrative    Not on file     Social Determinants of Health     Financial Resource Strain:     Difficulty of Paying Living Expenses:    Food Insecurity:     Worried About Running Out of Food in the Last Year:     [address] in the Last Year:    Transportation Needs:     Lack of Transportation (Medical):      Lack of Transportation (Non-Medical):    Physical Activity:     Days of Exercise per Week:     Minutes of Exercise per Session:    Stress:     Feeling of Stress :    Social Connections:     Frequency of Communication with Friends and Family:     Frequency of Social Gatherings with Friends and Family:     Attends [employer] Services:     Active Member of Clubs or Organizations:     Attends Club or Organization Meetings:     Marital Status:    Intimate Partner Violence:     Fear of Current or Ex-Partner:     Emotionally Abused:     Physically Abused:     Sexually Abused:      Current Facility-Administered Medications   Medication Dose Route Frequency Provider Last Rate Last Admin    magnesium oxide (MAG-OX) tablet 400 mg  400 mg Oral BID Aster Bah NP   400 mg at 06/25/21 1738    Vancomycin Trough Level Reminder   Other Nettie Alejandro MD        pantoprazole (PROTONIX) tablet 40 mg  40 mg Oral ACB Aster Bah NP   40 mg at 06/25/21 1528    dicyclomine (BENTYL) tablet 20 mg  20 mg Oral Radha Morgan NP   20 mg at 06/25/21 1618    LORazepam (ATIVAN) tablet 1 mg  1 mg Oral Q4H PRN Marguerite Boucher NP        morphine IR (MS IR) tablet 15 mg  15 mg Oral Q4H PRN Marguerite Boucher NP   15 mg at 06/25/21 2220    enoxaparin (LOVENOX) injection 40 mg  40 mg SubCUTAneous Q24H Flaquito VACA NP   40 mg at 06/24/21 1605    cefepime (MAXIPIME) 2 g in 0.9% sodium chloride (MBP/ADV) 100 mL MBP  2 g IntraVENous Q8H Félix Barone  mL/hr at 06/26/21 0307 2 g at 06/26/21 0307    tbo-filgrastim (GRANIX) injection 300 mcg  300 mcg SubCUTAneous DAILY Marguerite Boucher NP   300 mcg at 06/25/21 0819    loratadine (CLARITIN) tablet 10 mg  10 mg Oral DAILY Marguerite Boucher NP   10 mg at 06/25/21 0819    alcohol 62% (NOZIN) nasal  1 Ampule  1 Ampule Topical Q12H Denisse Loaiza MD   1 Ampule at 06/25/21 2016    vancomycin (VANCOCIN) 1250 mg in  ml infusion  1,250 mg IntraVENous Q12H Denisse Loaiza .7 mL/hr at 06/25/21 2111 1,250 mg at 06/25/21 2111    acetaminophen (TYLENOL) tablet 650 mg  650 mg Oral Q4H PRN Denisse Loaiza MD   650 mg at 06/24/21 1604    potassium chloride (K-DUR, KLOR-CON) SR tablet 20 mEq  20 mEq Oral BID Denisse Loaiza MD   20 mEq at 06/25/21 2034 OBJECTIVE:  Patient Vitals for the past 8 hrs:   BP Temp Pulse Resp SpO2   21 0757 117/61 98.8 °F (37.1 °C) 95 16 98 %   21 0312 111/62 98.5 °F (36.9 °C) (!) 103 16 98 %     Temp (24hrs), Av.1 °F (37.3 °C), Min:98.5 °F (36.9 °C), Max:99.5 °F (37.5 °C)    No intake/output data recorded. Physical Exam:  Constitutional: Well developed, well nourished female in no acute distress, sitting in the hospital bed. HEENT: Normocephalic and atraumatic. Oropharynx is clear, mucous membranes are moist. Extraocular muscles are intact. Sclerae anicteric. Neck supple. Skin Warm and dry. No bruising and no rash noted. No erythema. No pallor. Respiratory Lungs are clear to auscultation bilaterally without wheezes, rales or rhonchi, normal air exchange without accessory muscle use. CVS Normal rate, regular rhythm and normal S1 and S2. No murmurs, gallops, or rubs. Abdomen Soft, nontender and nondistended, normoactive bowel sounds. No palpable mass. No hepatosplenomegaly. Neuro Grossly nonfocal with no obvious sensory or motor deficits. MSK Normal range of motion in general.  No edema and no tenderness. Psych Appropriate mood and affect. Labs:    Recent Results (from the past 24 hour(s))   MAGNESIUM    Collection Time: 21  4:49 AM   Result Value Ref Range    Magnesium 1.9 1.8 - 2.4 mg/dL   CBC WITH AUTOMATED DIFF    Collection Time: 21  4:49 AM   Result Value Ref Range    WBC 3.6 (L) 4.3 - 11.1 K/uL    RBC 2.46 (L) 4.05 - 5.2 M/uL    HGB 7.4 (L) 11.7 - 15.4 g/dL    HCT 21.8 (L) 35.8 - 46.3 %    MCV 88.6 79.6 - 97.8 FL    MCH 30.1 26.1 - 32.9 PG    MCHC 33.9 31.4 - 35.0 g/dL    RDW 15.0 (H) 11.9 - 14.6 %    PLATELET 65 (L) 389 - 450 K/uL    MPV 10.7 9.4 - 12.3 FL    ABSOLUTE NRBC 0.00 0.0 - 0.2 K/uL    NEUTROPHILS 41 (L) 47 - 75 %    BAND NEUTROPHILS 6 0 - 6 %    LYMPHOCYTES 33 16 - 44 %    MONOCYTES 19 (H) 3 - 9 %    METAMYELOCYTES 1 %    ABS.  NEUTROPHILS 1.7 1.7 - 8.2 K/UL    ABS. LYMPHOCYTES 1.2 0.5 - 4.6 K/UL    ABS. MONOCYTES 0.7 0.1 - 1.3 K/UL    RBC COMMENTS SLIGHT  ANISOCYTOSIS + POIKILOCYTOSIS        WBC COMMENTS ATYPICAL LYMPHOCYTES PRESENT      PLATELET COMMENTS DECREASED      DF MANUAL         Imaging:  XR CHEST PA AND LATERAL [[id number]] Collected: 06/24/21 1551   Order Status: Completed Updated: 06/24/21 1553   Narrative:     Two view chest     History: Neutropenic fever. Eval for infiltrate. Comparison: None     Findings: A right internal jugular Mediport catheter tip overlies the distal   superior vena cava/right atrial junction. The cardiac and mediastinal silhouette   are normal in size and configuration. The lungs and pleural spaces are clear. The pulmonary vascularity is within normal limits. The visualized osseous   structures are unremarkable. Impression:     No active disease in the chest.          ASSESSMENT:  Problem List  Date Reviewed: 6/24/2021        Codes Class Noted    Pain, generalized ICD-10-CM: R52  ICD-9-CM: 780.96  6/24/2021        * (Principal) Neutropenic fever ([facility] ICD-10-CM: D70.9, R50.81  ICD-9-CM: 288.00, 780.61  6/24/2021        Dehydration ICD-10-CM: E86.0  ICD-9-CM: 276.51  6/22/2021        Recurrent breast cancer, left ([facility] ICD-10-CM: A43.859  ICD-9-CM: 174.9  3/16/2021        Encounter for IUD removal and reinsertion ICD-10-CM: Z30.433  ICD-9-CM: V25.13  2/16/2018        S/P bilateral mastectomy ICD-10-CM: Z90.13  ICD-9-CM: V45.71  8/31/2016        Malignant neoplasm of lower-inner quadrant of left female breast [facility]) ICD-10-CM: C50.312  ICD-9-CM: 174.3  8/4/2016        Post term pregnancy over 40 weeks ICD-10-CM: O48.0  ICD-9-CM: 645.10  7/24/2012            Ms. Ale Agiular is a 46 y.o. female admitted on June 24, 2021 with a primary diagnosis of neutropenic fever. She has a history of breast cancer originally diagnosed in 2017: 2.6 cm, g2, IDC, ER 2%, NM negative, HER2 negative.   S/p bilateral mastectomy and L sentinel node, negative. [id number] (stage IIA). She completed ddAC-wP in March 2017 and tolerated treatment very well. In March 2021, she noted a new nodule in the left breast, MRI imaging showed the mass to be 1.3 cm in size and was suspicious enough that biopsy was recommended. Unfortunately, this confirmed breast cancer, high grade, triple negative. MRI did not show any distant disease. We recommended surgical evaluation to discuss resection. Path reviewed and showed the tumor to be 1.6 cm, 9 lymph nodes were negative ([id number]). Her updated genetics were negative. It was decided to proceed with Taxotere and carboplatin q3w for 6 cycles. She is status post cycle cycle 3 without G-CSF support due to severe pain after cycle 1. She started with fever last night 100.5 and then again this morning up to 101. She called our office and was brought in for work-up. Labs revealed ANC 0.0, WBC 0.5, Hgb 9.6, platelets 806,201. Pan-cultures were obtained and she was taken to our outpatient infusion for urgent antibiotics. She denies any infectious symptoms including cough, dyspnea, mucositis, dysuria, wounds, etc. No nausea, vomiting. She does have some diarrhea this morning after taking stimulants for constipation. Though G-CSF has been omitted, she is still having a lot of generalized pain presently most likely related to Taxotere. She is taking Percocet 10/325 every6 hours without much relief. PLAN:  Breast Cancer  * Status post cycle 3 TC without G-CSF on 6/16. * Cycle 4 due July 7. Neutropenic Fever  * WBC 0.5, ANC 0.0, Hgb 9.6, platelet 057,914. * Follow pan-cultures, obtain chest xray. * Continue vancomycin/cefepime and follow cultures. * Start G-CSF daily with Claritin. 6/25 Tmax 102.7. CXR neg. BCx-NGTD. UCx pending. On Cef/Vanc. ANC up to 200. Con't Granix. 6/26 Afebrile. UCX with mixed skin ashleigh. BCx-NGTD. Con't Cef/Vanc (D3). ANC up to 1700. Give one more dose of Granix.     Generalized Pain   * Likely related to Taxotere. * Oxycodone not effective. * Try Morphine 15 mg every 4 hours PRN. * Should not need narcotics at discharge as Taxotere related and should resolve - when fevers resolve, can utilize anti-inflammatories. Pancytopenia secondary to chemotherapy  - Transfuse prn per Nadya SOPs    Continue home meds  Nadya SOP's  DVT prophylaxis with Lovenox    Goals and plan of care reviewed with the patient. All questions answered to the best of our ability. Disposition:  Anticipate possible discharge home tomorrow if pt remains afebrile with negative cultures. Mago Aguilar NP   [address] Hematology Oncology  [address] [address]  Office : [phone]  Fax : [phone]   I personally saw, exammed and counselled the patient, and discussed with NP, agree with above history/assessment/plan. 46 y. o.female triple negative breast cancer status post bilateral mastectomy and adjuvant chemo now had local recurrence status post resection again now on adjuvant TC admitted for neutropenic fever, ANC 0, shivering and appeared ill, broad-spectrum antibiotics with cefepime and vancomycin, Granix daily, responded properly and felt much better, abdominal pain resolved and no need for CT evaluation, continue antibiotics/Granix and IV fluid, afebrile over 24 hours, supportive care per Jeet Mancilla M.D.   [address], [address] [place] [address]  Office : [phone]  Fax : [phone]

## 2021-06-27 ENCOUNTER — HOSPITAL ENCOUNTER (INPATIENT)
Age: 41
LOS: 1 days | Discharge: ANOTHER HOSPITAL | DRG: 871 | End: 2021-06-27
Attending: INTERNAL MEDICINE | Admitting: INTERNAL MEDICINE
Payer: COMMERCIAL

## 2021-06-27 VITALS
OXYGEN SATURATION: 98 % | SYSTOLIC BLOOD PRESSURE: 114 MMHG | RESPIRATION RATE: 16 BRPM | BODY MASS INDEX: 28.06 KG/M2 | HEIGHT: 64 IN | HEART RATE: 85 BPM | DIASTOLIC BLOOD PRESSURE: 72 MMHG | TEMPERATURE: 98.1 F | WEIGHT: 164.38 LBS

## 2021-06-27 VITALS
DIASTOLIC BLOOD PRESSURE: 76 MMHG | OXYGEN SATURATION: 100 % | TEMPERATURE: 98.5 F | RESPIRATION RATE: 18 BRPM | SYSTOLIC BLOOD PRESSURE: 134 MMHG | HEART RATE: 87 BPM

## 2021-06-27 DIAGNOSIS — C50.312 MALIGNANT NEOPLASM OF LOWER-INNER QUADRANT OF LEFT BREAST IN FEMALE, ESTROGEN RECEPTOR NEGATIVE (HCC): ICD-10-CM

## 2021-06-27 DIAGNOSIS — R50.81 NEUTROPENIC FEVER (HCC): ICD-10-CM

## 2021-06-27 DIAGNOSIS — C50.912 RECURRENT BREAST CANCER, LEFT (HCC): ICD-10-CM

## 2021-06-27 DIAGNOSIS — D70.9 NEUTROPENIC FEVER (HCC): ICD-10-CM

## 2021-06-27 DIAGNOSIS — Z17.1 MALIGNANT NEOPLASM OF LOWER-INNER QUADRANT OF LEFT BREAST IN FEMALE, ESTROGEN RECEPTOR NEGATIVE (HCC): ICD-10-CM

## 2021-06-27 DIAGNOSIS — E86.0 DEHYDRATION: ICD-10-CM

## 2021-06-27 LAB
ALBUMIN SERPL-MCNC: 2.4 G/DL (ref 3.5–5)
ALBUMIN/GLOB SERPL: 0.7 {RATIO} (ref 1.2–3.5)
ALP SERPL-CCNC: 61 U/L (ref 50–136)
ALT SERPL-CCNC: 29 U/L (ref 12–65)
ANION GAP SERPL CALC-SCNC: 6 MMOL/L (ref 7–16)
AST SERPL-CCNC: 13 U/L (ref 15–37)
BASOPHILS # BLD: 0.1 K/UL (ref 0–0.2)
BASOPHILS NFR BLD: 1 % (ref 0–2)
BILIRUB SERPL-MCNC: 0.4 MG/DL (ref 0.2–1.1)
BUN SERPL-MCNC: 6 MG/DL (ref 6–23)
CALCIUM SERPL-MCNC: 8.6 MG/DL (ref 8.3–10.4)
CHLORIDE SERPL-SCNC: 108 MMOL/L (ref 98–107)
CO2 SERPL-SCNC: 26 MMOL/L (ref 21–32)
CREAT SERPL-MCNC: 0.48 MG/DL (ref 0.6–1)
DIFFERENTIAL METHOD BLD: ABNORMAL
EOSINOPHIL # BLD: 0 K/UL (ref 0–0.8)
EOSINOPHIL NFR BLD: 0 % (ref 0.5–7.8)
ERYTHROCYTE [DISTWIDTH] IN BLOOD BY AUTOMATED COUNT: 15.1 % (ref 11.9–14.6)
GLOBULIN SER CALC-MCNC: 3.3 G/DL (ref 2.3–3.5)
GLUCOSE SERPL-MCNC: 109 MG/DL (ref 65–100)
HCT VFR BLD AUTO: 22.4 % (ref 35.8–46.3)
HGB BLD-MCNC: 7.6 G/DL (ref 11.7–15.4)
IMM GRANULOCYTES # BLD AUTO: 0.1 K/UL (ref 0–0.5)
IMM GRANULOCYTES NFR BLD AUTO: 1 % (ref 0–5)
LYMPHOCYTES # BLD: 1.5 K/UL (ref 0.5–4.6)
LYMPHOCYTES NFR BLD: 20 % (ref 13–44)
MAGNESIUM SERPL-MCNC: 1.6 MG/DL (ref 1.8–2.4)
MCH RBC QN AUTO: 30.5 PG (ref 26.1–32.9)
MCHC RBC AUTO-ENTMCNC: 33.9 G/DL (ref 31.4–35)
MCV RBC AUTO: 90 FL (ref 79.6–97.8)
MONOCYTES # BLD: 0.5 K/UL (ref 0.1–1.3)
MONOCYTES NFR BLD: 7 % (ref 4–12)
NEUTS SEG # BLD: 5.3 K/UL (ref 1.7–8.2)
NEUTS SEG NFR BLD: 71 % (ref 43–78)
NRBC # BLD: 0 K/UL (ref 0–0.2)
PLATELET # BLD AUTO: 48 K/UL (ref 150–450)
PLATELET COMMENTS,PCOM: ABNORMAL
PMV BLD AUTO: 10.7 FL (ref 9.4–12.3)
POTASSIUM SERPL-SCNC: 3.3 MMOL/L (ref 3.5–5.1)
PROT SERPL-MCNC: 5.7 G/DL (ref 6.3–8.2)
RBC # BLD AUTO: 2.49 M/UL (ref 4.05–5.2)
RBC MORPH BLD: ABNORMAL
SODIUM SERPL-SCNC: 140 MMOL/L (ref 136–145)
WBC # BLD AUTO: 7.5 K/UL (ref 4.3–11.1)
WBC MORPH BLD: ABNORMAL

## 2021-06-27 PROCEDURE — 80053 COMPREHEN METABOLIC PANEL: CPT

## 2021-06-27 PROCEDURE — 36415 COLL VENOUS BLD VENIPUNCTURE: CPT

## 2021-06-27 PROCEDURE — 74011250637 HC RX REV CODE- 250/637: Performed by: INTERNAL MEDICINE

## 2021-06-27 PROCEDURE — 74011250637 HC RX REV CODE- 250/637: Performed by: NURSE PRACTITIONER

## 2021-06-27 PROCEDURE — 74011250636 HC RX REV CODE- 250/636: Performed by: NURSE PRACTITIONER

## 2021-06-27 PROCEDURE — 74011000258 HC RX REV CODE- 258: Performed by: NURSE PRACTITIONER

## 2021-06-27 PROCEDURE — 83735 ASSAY OF MAGNESIUM: CPT

## 2021-06-27 PROCEDURE — APPSS180 APP SPLIT SHARED TIME > 60 MINUTES: Performed by: NURSE PRACTITIONER

## 2021-06-27 PROCEDURE — 85025 COMPLETE CBC W/AUTO DIFF WBC: CPT

## 2021-06-27 PROCEDURE — 65270000029 HC RM PRIVATE

## 2021-06-27 PROCEDURE — 74011250636 HC RX REV CODE- 250/636: Performed by: INTERNAL MEDICINE

## 2021-06-27 RX ORDER — PANTOPRAZOLE SODIUM 40 MG/1
40 TABLET, DELAYED RELEASE ORAL
Qty: 30 TABLET | Refills: 0 | Status: SHIPPED | OUTPATIENT
Start: 2021-06-28 | End: 2021-07-07

## 2021-06-27 RX ORDER — POTASSIUM CHLORIDE 20 MEQ/1
20 TABLET, EXTENDED RELEASE ORAL 2 TIMES DAILY
Status: DISCONTINUED | OUTPATIENT
Start: 2021-06-27 | End: 2021-06-27

## 2021-06-27 RX ORDER — MORPHINE SULFATE 15 MG/1
15 TABLET ORAL
Status: DISCONTINUED | OUTPATIENT
Start: 2021-06-27 | End: 2021-06-27 | Stop reason: HOSPADM

## 2021-06-27 RX ORDER — LANOLIN ALCOHOL/MO/W.PET/CERES
400 CREAM (GRAM) TOPICAL 2 TIMES DAILY
Status: DISCONTINUED | OUTPATIENT
Start: 2021-06-27 | End: 2021-06-27

## 2021-06-27 RX ORDER — ENOXAPARIN SODIUM 100 MG/ML
40 INJECTION SUBCUTANEOUS EVERY 24 HOURS
Status: DISCONTINUED | OUTPATIENT
Start: 2021-06-27 | End: 2021-06-27 | Stop reason: HOSPADM

## 2021-06-27 RX ORDER — LORATADINE 10 MG/1
10 TABLET ORAL DAILY
Status: DISCONTINUED | OUTPATIENT
Start: 2021-06-28 | End: 2021-06-27 | Stop reason: HOSPADM

## 2021-06-27 RX ORDER — DICYCLOMINE HYDROCHLORIDE 20 MG/1
20 TABLET ORAL
Status: DISCONTINUED | OUTPATIENT
Start: 2021-06-27 | End: 2021-06-27 | Stop reason: HOSPADM

## 2021-06-27 RX ORDER — LANOLIN ALCOHOL/MO/W.PET/CERES
400 CREAM (GRAM) TOPICAL 3 TIMES DAILY
Qty: 21 TABLET | Refills: 0 | Status: SHIPPED | OUTPATIENT
Start: 2021-06-27 | End: 2022-01-24

## 2021-06-27 RX ORDER — POTASSIUM CHLORIDE 20 MEQ/1
40 TABLET, EXTENDED RELEASE ORAL 2 TIMES DAILY
Qty: 20 TABLET | Refills: 0 | Status: SHIPPED | OUTPATIENT
Start: 2021-06-27 | End: 2022-01-24

## 2021-06-27 RX ORDER — ACETAMINOPHEN 325 MG/1
650 TABLET ORAL
Status: DISCONTINUED | OUTPATIENT
Start: 2021-06-27 | End: 2021-06-27 | Stop reason: HOSPADM

## 2021-06-27 RX ORDER — POTASSIUM CHLORIDE 20 MEQ/1
40 TABLET, EXTENDED RELEASE ORAL 2 TIMES DAILY
Status: DISCONTINUED | OUTPATIENT
Start: 2021-06-27 | End: 2021-06-27 | Stop reason: HOSPADM

## 2021-06-27 RX ORDER — LEVOFLOXACIN 500 MG/1
500 TABLET, FILM COATED ORAL DAILY
Qty: 7 TABLET | Refills: 0 | Status: SHIPPED | OUTPATIENT
Start: 2021-06-27 | End: 2022-01-24 | Stop reason: ALTCHOICE

## 2021-06-27 RX ORDER — DICYCLOMINE HYDROCHLORIDE 10 MG/5ML
20 SOLUTION ORAL 4 TIMES DAILY
Qty: 280 ML | Refills: 0 | Status: SHIPPED | OUTPATIENT
Start: 2021-06-27 | End: 2022-01-24

## 2021-06-27 RX ORDER — LORAZEPAM 1 MG/1
1 TABLET ORAL
Status: DISCONTINUED | OUTPATIENT
Start: 2021-06-27 | End: 2021-06-27 | Stop reason: HOSPADM

## 2021-06-27 RX ORDER — LANOLIN ALCOHOL/MO/W.PET/CERES
400 CREAM (GRAM) TOPICAL 3 TIMES DAILY
Status: DISCONTINUED | OUTPATIENT
Start: 2021-06-27 | End: 2021-06-27 | Stop reason: HOSPADM

## 2021-06-27 RX ORDER — VANCOMYCIN HYDROCHLORIDE
1250 EVERY 12 HOURS
Status: DISCONTINUED | OUTPATIENT
Start: 2021-06-27 | End: 2021-06-27 | Stop reason: HOSPADM

## 2021-06-27 RX ORDER — PANTOPRAZOLE SODIUM 40 MG/1
40 TABLET, DELAYED RELEASE ORAL
Status: DISCONTINUED | OUTPATIENT
Start: 2021-06-28 | End: 2021-06-27 | Stop reason: HOSPADM

## 2021-06-27 RX ADMIN — MAGNESIUM OXIDE TAB 400 MG (241.3 MG ELEMENTAL MG) 400 MG: 400 (241.3 MG) TAB at 08:00

## 2021-06-27 RX ADMIN — PANTOPRAZOLE SODIUM 40 MG: 40 TABLET, DELAYED RELEASE ORAL at 06:06

## 2021-06-27 RX ADMIN — MORPHINE SULFATE 15 MG: 15 TABLET ORAL at 01:42

## 2021-06-27 RX ADMIN — POTASSIUM CHLORIDE 20 MEQ: 1500 TABLET, EXTENDED RELEASE ORAL at 08:00

## 2021-06-27 RX ADMIN — DICYCLOMINE HYDROCHLORIDE 20 MG: 20 TABLET ORAL at 11:44

## 2021-06-27 RX ADMIN — LORATADINE 10 MG: 10 TABLET ORAL at 08:00

## 2021-06-27 RX ADMIN — DICYCLOMINE HYDROCHLORIDE 20 MG: 20 TABLET ORAL at 06:06

## 2021-06-27 RX ADMIN — CEFEPIME HYDROCHLORIDE 2 G: 2 INJECTION, POWDER, FOR SOLUTION INTRAVENOUS at 04:01

## 2021-06-27 RX ADMIN — Medication 400 MG: at 11:44

## 2021-06-27 RX ADMIN — Medication 1 AMPULE: at 08:00

## 2021-06-27 RX ADMIN — VANCOMYCIN HYDROCHLORIDE 1250 MG: 10 INJECTION, POWDER, LYOPHILIZED, FOR SOLUTION INTRAVENOUS at 04:58

## 2021-06-27 NOTE — DISCHARGE SUMMARY
[facility] Hematology & Oncology: Inpatient Hematology / Oncology Discharge Summary Note    Patient ID:  Carrie Cates  [id number]  85 y.o.  1980    Admit Date: 6/27/2021    Discharge Date: 6/27/2021    Admission Diagnoses: Neutropenic fever ([facility] [D70.9, R50.81]    Discharge Diagnoses:  Principal Diagnosis: <principal problem not specified>  Active Problems:    Neutropenic fever ([address] [facility] (6/24/2021)        Hospital Course:  Ms. Lottie Hanna is a 46 y.o. female admitted on June 24, 2021 with a primary diagnosis of neutropenic fever.      She has a history of breast cancer originally diagnosed in 2017: 2.6 cm, g2, IDC, ER 2%, DC negative, HER2 negative.  S/p bilateral mastectomy and L sentinel node, negative.  [id number] (stage IIA).  She completed ddAC-wP in March 2017 and tolerated treatment very well.  In March 2021, she noted a new nodule in the left breast, MRI imaging showed the mass to be 1.3 cm in size and was suspicious enough that biopsy was recommended.  Unfortunately, this confirmed breast cancer, high grade, triple negative.  MRI did not show any distant disease.  We recommended surgical evaluation to discuss resection.  Path reviewed and showed the tumor to be 1.6 cm, 9 lymph nodes were negative ([id number]).  Her updated genetics were negative. It was decided to proceed with Taxotere and carboplatin q3w for 6 cycles.      She is status post cycle cycle 3 without G-CSF support due to severe pain after cycle 1. She started with fever last night 100.5 and then again this morning up to 101. She called our office and was brought in for work-up. Labs revealed ANC 0.0, WBC 0.5, Hgb 9.6, platelets 884,425. Pan-cultures were obtained and she was taken to our outpatient infusion for urgent antibiotics. She denies any infectious symptoms including cough, dyspnea, mucositis, dysuria, wounds, etc. No nausea, vomiting. She does have some diarrhea this morning after taking stimulants for constipation.  Though G-CSF has been omitted, she is still having a lot of generalized pain presently most likely related to Taxotere. She is taking Percocet 10/325 every6 hours without much relief. She was transferred to [facility] on 6/27 for continuation of care. She has remained afebrile for >48 hours. BCx-NGTD. UCx with mixed skin ashleigh. CXR neg. ANC up to 5.3 s/p Granix. She is feeling well and is ready for discharge home. She will continue Levaquin upon discharge to complete ABT. Pt requests scripts for Protonix and Bentyl as it has been helping with the abdominal cramping she was experiencing with meals. She will f/u as previously scheduled on 7/7. Advised to call with fever, chills, uncontrollable symptoms, or with any other concerns. Consults:  None    Pertinent Diagnostic Studies:   Labs:    Recent Labs     06/27/21  0515 06/26/21  0449 06/25/21  0530   WBC 7.5 3.6* 1.4*   HGB 7.6* 7.4* 7.1*   PLT 48* 65* 71*   ANEU 5.3 1.7 0.2*      Recent Labs     06/27/21  0515 06/26/21  0820 06/26/21  0449 06/25/21  0530    140  --  136   K 3.3* 3.3*  --  3.5   * 105  --  104   CO2 26 28  --  25   * 83  --  87   BUN 6 7  --  7   CREA 0.48* 0.53*  --  0.51*   CA 8.6 8.9  --  8.4   AP 61 59  --  54   TP 5.7* 6.4  --  5.8*   ALB 2.4* 2.7*  --  2.5*   MG 1.6*  --  1.9 1.7*       Imaging:  Two view chest     History: Neutropenic fever. Eval for infiltrate.      Comparison: None     Findings: A right internal jugular Mediport catheter tip overlies the distal  superior vena cava/right atrial junction. The cardiac and mediastinal silhouette  are normal in size and configuration. The lungs and pleural spaces are clear. The pulmonary vascularity is within normal limits. The visualized osseous  structures are unremarkable.     IMPRESSION  No active disease in the chest.    Current Discharge Medication List      START taking these medications    Details   levoFLOXacin (Levaquin) 500 mg tablet Take 1 Tablet by mouth daily.   Qty: 7 Tablet, Refills: 0  Start date: 2021      magnesium oxide (MAG-OX) 400 mg tablet Take 1 Tablet by mouth three (3) times daily. Qty: 21 Tablet, Refills: 0  Start date: 2021      potassium chloride (K-DUR, KLOR-CON) 20 mEq tablet Take 2 Tablets by mouth two (2) times a day. Qty: 20 Tablet, Refills: 0  Start date: 2021      pantoprazole (PROTONIX) 40 mg tablet Take 1 Tablet by mouth Daily (before breakfast). Qty: 30 Tablet, Refills: 0  Start date: 2021      dicyclomine (BENTYL) 10 mg/5 mL soln oral solution Take 10 mL by mouth four (4) times daily. Qty: 280 mL, Refills: 0  Start date: 2021         CONTINUE these medications which have NOT CHANGED    Details   diclofenac (VOLTAREN XR) 100 mg ER tablet Take 1 Tablet by mouth daily. Qty: 30 Tablet, Refills: 1      dexAMETHasone (Decadron) 4 mg tablet Take 2 tablets twice a day - day before, day of, and day after chemo treatment  Qty: 72 Tab, Refills: 0      LORazepam (ATIVAN) 1 mg tablet Take 1 Tab by mouth every four (4) hours as needed for Anxiety. Max Daily Amount: 6 mg. As needed for nausea, vomiting or anxiety  Qty: 60 Tab, Refills: 1    Associated Diagnoses: Malignant neoplasm of lower-inner quadrant of left breast in female, estrogen receptor positive (HCC)      lidocaine-prilocaine (EMLA) topical cream Apply  to affected area as needed for Pain. Place over port site 30-45 minutes prior to chemotherapy  Qty: 30 g, Refills: 1      clindamycin (CLINDAGEL) 1 % topical gel 1 APPLICATION ONCE A DAY TO FACE EXTERNALLY 30 DAYS      LEVONORGESTREL (MIRENA IU) by IntraUTERine route. Indications: placed              OBJECTIVE:  Patient Vitals for the past 8 hrs:   BP Temp Pulse Resp SpO2   21 0911 134/76 98.5 °F (36.9 °C) 87 18 100 %     Temp (24hrs), Av.4 °F (36.9 °C), Min:97.9 °F (36.6 °C), Max:98.9 °F (37.2 °C)    No intake/output data recorded.     Physical Exam:  Constitutional: Well developed, well nourished female in no acute distress, sitting comfortably on the hospital bed. HEENT: Normocephalic and atraumatic. Oropharynx is clear, mucous membranes are moist.  Extraocular muscles are intact. Sclerae anicteric. Neck supple without JVD. No thyromegaly present. Skin Warm and dry. No bruising and no rash noted. No erythema. No pallor. Respiratory Lungs are clear to auscultation bilaterally without wheezes, rales or rhonchi, normal air exchange without accessory muscle use. CVS Normal rate, regular rhythm and normal S1 and S2. No murmurs, gallops, or rubs. Abdomen Soft, nontender and nondistended, normoactive bowel sounds. No palpable mass. No hepatosplenomegaly. Neuro Grossly nonfocal with no obvious sensory or motor deficits. MSK Normal range of motion in general.  No edema and no tenderness. Psych Appropriate mood and affect. ASSESSMENT:    Active Problems:    Neutropenic fever ([address] (6/24/2021)        DISPOSITION:  Follow-up Appointments   Procedures    FOLLOW UP VISIT Appointment in: Other (Mik Lopez) Follow up as previously scheduled on 7/7     Follow up as previously scheduled on 7/7     Standing Status:   Standing     Number of Occurrences:   1     Order Specific Question:   Appointment in     Answer: Other (Specify)       60 minutes was spent in discharge planning and coordination of care. Robert Barnett NP  [facility] Hematology & Oncology  [address] [place] [address]  Office : [phone]  Fax : [phone]   I personally saw, exammed and counselled the patient, and discussed with NP, agree with above history/assessment/plan. 41 y. o.female triple negative breast cancer status post bilateral mastectomy and adjuvant chemo now had local recurrence status post resection again now on adjuvant TC admitted for neutropenic fever, ANC 0, shivering and appeared ill, broad-spectrum antibiotics with cefepime and vancomycin, Granix daily, responded properly and felt much better, abdominal pain resolved and no need for CT evaluation, WBC recovered and stop Granix, afebrile over 48 hours, arrange DC home with oral antibiotics. Scheduled follow in office in a week.       Fabio Llanes M.D.   [address]  Office : [phone]  Fax : [phone]

## 2021-06-27 NOTE — PROGRESS NOTES
Patient arrived to [facility] room 521. VSS.  Pt resting comfortably with no needs voiced at this time

## 2021-06-27 NOTE — PROGRESS NOTES
06/27/21 0918   Dual Skin Pressure Injury Assessment   Dual Skin Pressure Injury Assessment WDL   Second Care Provider (Based on [address] [place] [address]) Simeon Puckett RN   Skin Integumentary   Skin Integumentary (WDL) WDL    Pressure  Injury Documentation No Pressure Injury Noted-Pressure Ulcer Prevention Initiated   Skin Color Appropriate for ethnicity   Skin Integrity Scars (comment)

## 2021-06-27 NOTE — H&P
[facility]        Inpatient Hematology / Oncology History and Physical    Reason for Admission:  Neutropenic fever    History of Present Illness:  Ms. Russell is a 41 y.o. female admitted on June 24, 2021 with a primary diagnosis of neutropenic fever.      She has a history of breast cancer originally diagnosed in 2017: 2.6 cm, g2, IDC, ER 2%, OK negative, HER2 negative.  S/p bilateral mastectomy and L sentinel node, negative.  [id number] (stage IIA).  She completed ddAC-wP in March 2017 and tolerated treatment very well.  In March 2021, she noted a new nodule in the left breast, MRI imaging showed the mass to be 1.3 cm in size and was suspicious enough that biopsy was recommended.  Unfortunately, this confirmed breast cancer, high grade, triple negative.  MRI did not show any distant disease.  We recommended surgical evaluation to discuss resection.  Path reviewed and showed the tumor to be 1.6 cm, 9 lymph nodes were negative ([id number]).  Her updated genetics were negative. It was decided to proceed with Taxotere and carboplatin q3w for 6 cycles.      She is status post cycle cycle 3 without G-CSF support due to severe pain after cycle 1. She started with fever last night 100.5 and then again this morning up to 101. She called our office and was brought in for work-up. Labs revealed ANC 0.0, WBC 0.5, Hgb 9.6, platelets 275,009. Pan-cultures were obtained and she was taken to our outpatient infusion for urgent antibiotics. She denies any infectious symptoms including cough, dyspnea, mucositis, dysuria, wounds, etc. No nausea, vomiting. She does have some diarrhea this morning after taking stimulants for constipation. Though G-CSF has been omitted, she is still having a lot of generalized pain presently most likely related to Taxotere. She is taking Percocet 10/325 every6 hours without much relief.         She was transferred to [facility] on 6/27 for continuation of care.       Review of Systems:  Constitutional Denies fever, chills, weight loss, appetite changes, fatigue, night sweats. HEENT Denies trauma, blurry vision, hearing loss, ear pain, nosebleeds, sore throat, neck pain and ear discharge. Skin Denies lesions or rashes. Lungs Denies dyspnea, cough, sputum production or hemoptysis. Cardiovascular Denies chest pain, palpitations, or lower extremity edema. Gastrointestinal Denies nausea, vomiting, changes in bowel habits, bloody or black stools, abdominal pain.  Denies dysuria, frequency or hesitancy of urination. Neuro Denies headaches, visual changes or ataxia. Denies dizziness, tingling, tremors, sensory change, speech change, focal weakness or headaches. Hematology Denies easy bruising or bleeding, denies gingival bleeding or epistaxis. Endo Denies heat/cold intolerance, denies diabetes or thyroid abnormalities. MSK Denies back pain, arthralgias, myalgias or frequent falls. Psychiatric/Behavioral Denies depression and substance abuse. The patient is not nervous/anxious.          Allergies   Allergen Reactions    Lortab [Hydrocodone-Acetaminophen] Itching    Zofran [Ondansetron Hcl (Pf)] Unknown (comments)     \"severe migraines\"     Past Medical History:   Diagnosis Date    [facility]     Cancer ([facility] dx--7/28/16    left breast cancer--- surgery and chemo     MRSA infection 2014    LEFT EAR     Past Surgical History:   Procedure Laterality Date    HX BREAST BIOPSY Left 03/02/2021    HX BREAST LUMPECTOMY Left 3/31/2021    LEFT BREAST LUMPECTOMY performed by Puneet Carreon MD at [facility] MA[facility] OR    HX BREAST RECONSTRUCTION Bilateral 8/30/2016    BREAST TISSUE EXPANDER INSERTION AND NIPPLE SPARING/ BILATERAL  performed by Kary Boxer, MD at [facility] MAIN OR    HX BREAST RECONSTRUCTION Bilateral 4/5/2017    BILATERAL BREAST TISSUE EXPANDER REMOVAL /PLACEMENT OF PERMANENT IMPLANTS performed by Kary Boxer, MD at [address] [place] [address] BREAST RECONSTRUCTION Bilateral 2020    BILATERAL REVISION OF BREAST RECONSTRUCTION/ performed by Bel Fuller MD at [address] HX BREAST RECONSTRUCTION Left 3/31/2021    LEFT BREAST COMPLEX CLOSURE performed by Bel Fuller MD at [facility] MAIN OR    HX CERVICAL POLYPECTOMY      UTERINE    HX  SECTION  2012    x 1     HX KNEE ARTHROSCOPY Right 2004    HX MASTECTOMY Bilateral 2016    BREAST MASTECTOMY SIMPLE BILATERAL performed by Sherri Amaral MD at [address] HX ORTHOPAEDIC Right     cyst removed from foot    HX ORTHOPAEDIC Right 08/15/2019    RIGHT KIDNER PROCEDURE CHOICE ANES (Right Foot)    HX OTHER SURGICAL  2019    RIGHT FOOT EXCISION SOFT TISSUE MASS (Right Foot)     HX VASCULAR ACCESS  2016    life port, and removed    HX WISDOM TEETH EXTRACTION      IR INSERT TUNL CVC W PORT OVER 5 YEARS  4/15/2021     Family History   Problem Relation Age of Onset    Hypertension Mother     Cancer Sister         THYROID    Bipolar Disorder Sister     Diabetes Paternal Grandmother     Stroke Paternal Grandmother     Heart Attack Maternal Grandfather     Heart Disease Maternal Grandfather      Social History     Socioeconomic History    Marital status:      Spouse name: Not on file    Number of children: Not on file    Years of education: Not on file    Highest education level: Not on file   Occupational History    Not on file   Tobacco Use    Smoking status: Never Smoker    Smokeless tobacco: Never Used   Substance and Sexual Activity    Alcohol use: Yes     Alcohol/week: 0.0 standard drinks     Comment: rare    Drug use: No    Sexual activity: Yes     Birth control/protection: I.U.D.    Other Topics Concern    Not on file   Social History Narrative    Not on file     Social Determinants of Health     Financial Resource Strain:     Difficulty of Paying Living Expenses:    Food Insecurity:     Worried About Running Out of Food in the Last Year:    [address] in the Last Year:    Transportation Needs:     Lack of Transportation (Medical):  Lack of Transportation (Non-Medical):    Physical Activity:     Days of Exercise per Week:     Minutes of Exercise per Session:    Stress:     Feeling of Stress :    Social Connections:     Frequency of Communication with Friends and Family:     Frequency of Social Gatherings with Friends and Family:     Attends Voodoo Services:     Active Member of Clubs or Organizations:     Attends Club or Organization Meetings:     Marital Status:    Intimate Partner Violence:     Fear of Current or Ex-Partner:     Emotionally Abused:     Physically Abused:     Sexually Abused:      Current Outpatient Medications   Medication Sig Dispense Refill    diclofenac (VOLTAREN XR) 100 mg ER tablet Take 1 Tablet by mouth daily. (Patient not taking: Reported on 6/16/2021) 30 Tablet 1    dexAMETHasone (Decadron) 4 mg tablet Take 2 tablets twice a day - day before, day of, and day after chemo treatment 72 Tab 0    LORazepam (ATIVAN) 1 mg tablet Take 1 Tab by mouth every four (4) hours as needed for Anxiety. Max Daily Amount: 6 mg. As needed for nausea, vomiting or anxiety 60 Tab 1    lidocaine-prilocaine (EMLA) topical cream Apply  to affected area as needed for Pain. Place over port site 30-45 minutes prior to chemotherapy 30 g 1    clindamycin (CLINDAGEL) 1 % topical gel 1 APPLICATION ONCE A DAY TO FACE EXTERNALLY 30 DAYS      LEVONORGESTREL (MIRENA IU) by IntraUTERine route.  Indications: placed 2013       Facility-Administered Medications Ordered in Other Encounters   Medication Dose Route Frequency Provider Last Rate Last Admin    magnesium oxide (MAG-OX) tablet 400 mg  400 mg Oral BID Kylah Combs NP   400 mg at 06/27/21 0800    pantoprazole (PROTONIX) tablet 40 mg  40 mg Oral ACB Kylah Combs NP   40 mg at 06/27/21 0606    dicyclomine (BENTYL) tablet 20 mg  20 mg Oral Dana Osullivan NP 20 mg at 21 0606    LORazepam (ATIVAN) tablet 1 mg  1 mg Oral Q4H PRN Siri Dylan, NP        morphine IR (MS IR) tablet 15 mg  15 mg Oral Q4H PRN Siri Lovings, NP   15 mg at 21 0142    enoxaparin (LOVENOX) injection 40 mg  40 mg SubCUTAneous Q24H Lizton Pallas K, NP   40 mg at 21 1605    cefepime (MAXIPIME) 2 g in 0.9% sodium chloride (MBP/ADV) 100 mL MBP  2 g IntraVENous Q8H MarullSully azevedora Lo K,  mL/hr at 21 0401 2 g at 21 0401    loratadine (CLARITIN) tablet 10 mg  10 mg Oral DAILY Siri Dylan, NP   10 mg at 21 0800    alcohol 62% (NOZIN) nasal  1 Ampule  1 Ampule Topical Q12H Jeannine Isaacs MD   1 Ampule at 21 0800    vancomycin (VANCOCIN) 1250 mg in  ml infusion  1,250 mg IntraVENous Q12H Jeannine Isaacs .7 mL/hr at 21 0458 1,250 mg at 21 0458    acetaminophen (TYLENOL) tablet 650 mg  650 mg Oral Q4H PRN Jeannine Isaacs MD   650 mg at 21 1604    potassium chloride (K-DUR, KLOR-CON) SR tablet 20 mEq  20 mEq Oral BID Jeannine Isaacs MD   20 mEq at 21 0800       OBJECTIVE:  No data found. Temp (24hrs), Av.3 °F (36.8 °C), Min:97.9 °F (36.6 °C), Max:98.9 °F (37.2 °C)     07 -  1900  In: 240 [P.O.:240]  Out: -     Physical Exam:  Constitutional: Well developed, well nourished female in no acute distress, sitting comfortably in the hospital bed. HEENT: Normocephalic and atraumatic. Oropharynx is clear, mucous membranes are moist.  Extraocular muscles are intact. Sclerae anicteric. Neck supple without JVD. No thyromegaly present. Skin Warm and dry. No bruising and no rash noted. No erythema. No pallor. Respiratory Lungs are clear to auscultation bilaterally without wheezes, rales or rhonchi, normal air exchange without accessory muscle use. CVS Normal rate, regular rhythm and normal S1 and S2. No murmurs, gallops, or rubs.    Abdomen Soft, nontender and nondistended, normoactive bowel sounds. No palpable mass. No hepatosplenomegaly. Neuro Grossly nonfocal with no obvious sensory or motor deficits. MSK Normal range of motion in general.  No edema and no tenderness. Psych Appropriate mood and affect. Labs:    Recent Results (from the past 24 hour(s))   MAGNESIUM    Collection Time: 06/27/21  5:15 AM   Result Value Ref Range    Magnesium 1.6 (L) 1.8 - 2.4 mg/dL   CBC WITH AUTOMATED DIFF    Collection Time: 06/27/21  5:15 AM   Result Value Ref Range    WBC 7.5 4.3 - 11.1 K/uL    RBC 2.49 (L) 4.05 - 5.2 M/uL    HGB 7.6 (L) 11.7 - 15.4 g/dL    HCT 22.4 (L) 35.8 - 46.3 %    MCV 90.0 79.6 - 97.8 FL    MCH 30.5 26.1 - 32.9 PG    MCHC 33.9 31.4 - 35.0 g/dL    RDW 15.1 (H) 11.9 - 14.6 %    PLATELET 48 (L) 819 - 450 K/uL    MPV 10.7 9.4 - 12.3 FL    ABSOLUTE NRBC 0.00 0.0 - 0.2 K/uL    NEUTROPHILS 71 43 - 78 %    LYMPHOCYTES 20 13 - 44 %    MONOCYTES 7 4.0 - 12.0 %    EOSINOPHILS 0 (L) 0.5 - 7.8 %    BASOPHILS 1 0.0 - 2.0 %    IMMATURE GRANULOCYTES 1 0.0 - 5.0 %    ABS. NEUTROPHILS 5.3 1.7 - 8.2 K/UL    ABS. LYMPHOCYTES 1.5 0.5 - 4.6 K/UL    ABS. MONOCYTES 0.5 0.1 - 1.3 K/UL    ABS. EOSINOPHILS 0.0 0.0 - 0.8 K/UL    ABS. BASOPHILS 0.1 0.0 - 0.2 K/UL    ABS. IMM.  GRANS. 0.1 0.0 - 0.5 K/UL    RBC COMMENTS SLIGHT  ANISOCYTOSIS + POIKILOCYTOSIS        RBC COMMENTS SLIGHT  POLYCHROMASIA        RBC COMMENTS SLIGHT  MACROCYTOSIS        WBC COMMENTS Result Confirmed By Smear      PLATELET COMMENTS DECREASED      DF AUTOMATED     METABOLIC PANEL, COMPREHENSIVE    Collection Time: 06/27/21  5:15 AM   Result Value Ref Range    Sodium 140 136 - 145 mmol/L    Potassium 3.3 (L) 3.5 - 5.1 mmol/L    Chloride 108 (H) 98 - 107 mmol/L    CO2 26 21 - 32 mmol/L    Anion gap 6 (L) 7 - 16 mmol/L    Glucose 109 (H) 65 - 100 mg/dL    BUN 6 6 - 23 MG/DL    Creatinine 0.48 (L) 0.6 - 1.0 MG/DL    GFR est AA >60 >60 ml/min/1.73m2    GFR est non-AA >60 >60 ml/min/1.73m2    Calcium 8.6 8.3 - 10.4 MG/DL Bilirubin, total 0.4 0.2 - 1.1 MG/DL    ALT (SGPT) 29 12 - 65 U/L    AST (SGOT) 13 (L) 15 - 37 U/L    Alk. phosphatase 61 50 - 136 U/L    Protein, total 5.7 (L) 6.3 - 8.2 g/dL    Albumin 2.4 (L) 3.5 - 5.0 g/dL    Globulin 3.3 2.3 - 3.5 g/dL    A-G Ratio 0.7 (L) 1.2 - 3.5         Imaging:  XR CHEST PA AND LATERAL [[id number]] Collected: 06/24/21 1551   Order Status: Completed Updated: 06/24/21 1553   Narrative:     Two view chest     History: Neutropenic fever. Eval for infiltrate. Comparison: None     Findings: A right internal jugular Mediport catheter tip overlies the distal   superior vena cava/right atrial junction. The cardiac and mediastinal silhouette   are normal in size and configuration. The lungs and pleural spaces are clear. The pulmonary vascularity is within normal limits. The visualized osseous   structures are unremarkable. Impression:     No active disease in the chest.          ASSESSMENT:  Problem List  Date Reviewed: 6/24/2021        Codes Class Noted    Pain, generalized ICD-10-CM: R52  ICD-9-CM: 780.96  6/24/2021        Neutropenic fever ([facility] ICD-10-CM: D70.9, R50.81  ICD-9-CM: 288.00, 780.61  6/24/2021        Dehydration ICD-10-CM: E86.0  ICD-9-CM: 276.51  6/22/2021        Recurrent breast cancer, left ([facility] ICD-10-CM: O25.640  ICD-9-CM: 174.9  3/16/2021        Encounter for IUD removal and reinsertion ICD-10-CM: Z30.433  ICD-9-CM: V25.13  2/16/2018        S/P bilateral mastectomy ICD-10-CM: Z90.13  ICD-9-CM: V45.71  8/31/2016        Malignant neoplasm of lower-inner quadrant of left female breast [facility]) ICD-10-CM: C50.312  ICD-9-CM: 174.3  8/4/2016        Post term pregnancy over 40 weeks ICD-10-CM: O48.0  ICD-9-CM: 645.10  7/24/2012                PLAN:  Breast Cancer  * Status post cycle 3 TC without G-CSF on 6/16. * Cycle 4 due July 7. Neutropenic Fever  * WBC 0.5, ANC 0.0, Hgb 9.6, platelet 059,850. * Follow pan-cultures, obtain chest xray.    * Continue vancomycin/cefepime and follow cultures. * Start G-CSF daily with Claritin. 6/25 Tmax 102.7. CXR neg. BCx-NGTD. UCx pending. On Cef/Vanc. ANC up to 200. Con't Granix. 6/26 Afebrile. UCX with mixed skin ashleigh. BCx-NGTD. Con't Cef/Vanc (D3). ANC up to 1700. Give one more dose of Granix. 6/27 Remains afebrile. BCx-NGTD. ANC up to 5.3. Generalized Pain   * Likely related to Taxotere. * Oxycodone not effective. * Try Morphine 15 mg every 4 hours PRN. * Should not need narcotics at discharge as Taxotere related and should resolve - when fevers resolve, can utilize anti-inflammatories. Pancytopenia secondary to chemotherapy  - Transfuse prn per Nadya SOPs    Continue home meds  Nadya SOP's  DVT prophylaxis with Lovenox     Goals and plan of care reviewed with the patient. All questions answered to the best of our ability.     Disposition:  Anticipate discharge home today. Andres Sheffield NP   [facility]  [address] [address]  Office : [phone]  Fax : [phone]   I personally saw, exammed and counselled the patient, and discussed with NP, agree with above history/assessment/plan. 41 y. o.female triple negative breast cancer status post bilateral mastectomy and adjuvant chemo now had local recurrence status post resection again now on adjuvant TC admitted for neutropenic fever, ANC 0, shivering and appeared ill, broad-spectrum antibiotics with cefepime and vancomycin, Granix daily, responded properly and felt much better, abdominal pain resolved and no need for CT evaluation, WBC recovered and stop Granix, afebrile over 48 hours, arrange DC home with oral antibiotics.       Jerline Sicard, M.D.   [address]  Office : [phone]  Fax : [phone]

## 2021-06-27 NOTE — PROGRESS NOTES
Problem: Falls - Risk of  Goal: *Absence of Falls  Description: Document Ninfa Anastasiya Fall Risk and appropriate interventions in the flowsheet.   Outcome: Progressing Towards Goal  Note: Fall Risk Interventions:            Medication Interventions: Teach patient to arise slowly

## 2021-06-27 NOTE — PROGRESS NOTES
END OF SHIFT NOTE:    Intake/Output  06/26 1901 - 06/27 0700  In: 835 [P.O.:480; I.V.:355]  Out: 1100 [Urine:1100]   Voiding: YES  Catheter: NO  Drain:              Stool:  0 occurrences. Stool Assessment  Stool Color: Other (Comment) (not assessed;per pt) (06/25/21 2200)  Stool Appearance: Soft (per pt) (06/25/21 2200)  Stool Amount: Small (per pt) (06/25/21 2200)  Stool Source/Status: Rectum (06/25/21 2200)    Emesis:  0 occurrences. VITAL SIGNS  Patient Vitals for the past 12 hrs:   Temp Pulse Resp BP SpO2   06/27/21 0236 97.9 °F (36.6 °C) 98 16 122/62 99 %   06/26/21 2318 98.9 °F (37.2 °C) 88 16 121/63 99 %   06/26/21 1918 98.4 °F (36.9 °C) 96 16 (!) 144/82 99 %       Pain Assessment  Pain 1  Pain Scale 1: Numeric (0 - 10) (06/27/21 0240)  Pain Intensity 1: 0 (06/27/21 0240)  Patient Stated Pain Goal: 0 (06/27/21 0240)  Pain Reassessment 1: Yes (06/27/21 0240)  Pain Onset 1: pta (06/27/21 0142)  Pain Location 1: Back (06/27/21 0142)  Pain Orientation 1: Lower (06/27/21 0142)  Pain Description 1: Aching (06/27/21 0142)  Pain Intervention(s) 1: Medication (see MAR); Rest (06/27/21 0142)    Ambulating  Yes    Additional Information:   Afebrile overnight. Anticipates d/c today. Shift report given to oncoming nurse Rae RN at the bedside.     Nasrin Lerner RN

## 2021-06-29 ENCOUNTER — PATIENT OUTREACH (OUTPATIENT)
Dept: CASE MANAGEMENT | Age: 41
End: 2021-06-29

## 2021-06-29 LAB
BACTERIA SPEC CULT: NORMAL
BACTERIA SPEC CULT: NORMAL
SERVICE CMNT-IMP: NORMAL
SERVICE CMNT-IMP: NORMAL

## 2021-06-29 NOTE — PROGRESS NOTES
6/29/2021 Saw the patient with Dr Sudhir Levy. She is here to discuss treatment. We will plan to proceed with treatment next week with reduced dose. She will need udenyca this next cycle because she was admitted with neutropenic fever. She is still having a lot of stomach cramping. She is agreeable to proceed with next cycle at a reduced dose and with udenyca. Will continue to follow.

## 2021-07-07 ENCOUNTER — PATIENT OUTREACH (OUTPATIENT)
Dept: CASE MANAGEMENT | Age: 41
End: 2021-07-07

## 2021-07-07 ENCOUNTER — HOSPITAL ENCOUNTER (OUTPATIENT)
Dept: INFUSION THERAPY | Age: 41
Discharge: HOME | End: 2021-07-07
Payer: COMMERCIAL

## 2021-07-07 DIAGNOSIS — Z17.1 MALIGNANT NEOPLASM OF LOWER-INNER QUADRANT OF LEFT BREAST IN FEMALE, ESTROGEN RECEPTOR NEGATIVE (HCC): ICD-10-CM

## 2021-07-07 DIAGNOSIS — C50.312 MALIGNANT NEOPLASM OF LOWER-INNER QUADRANT OF LEFT BREAST IN FEMALE, ESTROGEN RECEPTOR NEGATIVE (HCC): ICD-10-CM

## 2021-07-07 DIAGNOSIS — E83.42 HYPOMAGNESEMIA: Primary | ICD-10-CM

## 2021-07-07 LAB
ALBUMIN SERPL-MCNC: 3.6 G/DL (ref 3.5–5)
ALBUMIN/GLOB SERPL: 1.1 {RATIO} (ref 1.2–3.5)
ALP SERPL-CCNC: 67 U/L (ref 50–136)
ALT SERPL-CCNC: 78 U/L (ref 12–65)
ANION GAP SERPL CALC-SCNC: 7 MMOL/L (ref 7–16)
AST SERPL-CCNC: 43 U/L (ref 15–37)
BASOPHILS # BLD: 0 K/UL (ref 0–0.2)
BASOPHILS NFR BLD: 0 % (ref 0–2)
BILIRUB SERPL-MCNC: 0.6 MG/DL (ref 0.2–1.1)
BUN SERPL-MCNC: 14 MG/DL (ref 6–23)
CALCIUM SERPL-MCNC: 9.4 MG/DL (ref 8.3–10.4)
CHLORIDE SERPL-SCNC: 104 MMOL/L (ref 98–107)
CO2 SERPL-SCNC: 24 MMOL/L (ref 21–32)
CREAT SERPL-MCNC: 0.6 MG/DL (ref 0.6–1)
DIFFERENTIAL METHOD BLD: ABNORMAL
EOSINOPHIL # BLD: 0 K/UL (ref 0–0.8)
EOSINOPHIL NFR BLD: 0 % (ref 0.5–7.8)
ERYTHROCYTE [DISTWIDTH] IN BLOOD BY AUTOMATED COUNT: 18.4 % (ref 11.9–14.6)
GLOBULIN SER CALC-MCNC: 3.4 G/DL (ref 2.3–3.5)
GLUCOSE SERPL-MCNC: 200 MG/DL (ref 65–100)
HCT VFR BLD AUTO: 24.7 % (ref 35.8–46.3)
HGB BLD-MCNC: 8.4 G/DL (ref 11.7–15.4)
IMM GRANULOCYTES # BLD AUTO: 0.1 K/UL (ref 0–0.5)
IMM GRANULOCYTES NFR BLD AUTO: 1 % (ref 0–5)
LYMPHOCYTES # BLD: 0.7 K/UL (ref 0.5–4.6)
LYMPHOCYTES NFR BLD: 10 % (ref 13–44)
MAGNESIUM SERPL-MCNC: 1.4 MG/DL (ref 1.8–2.4)
MCH RBC QN AUTO: 31.9 PG (ref 26.1–32.9)
MCHC RBC AUTO-ENTMCNC: 34 G/DL (ref 31.4–35)
MCV RBC AUTO: 93.9 FL (ref 79.6–97.8)
MONOCYTES # BLD: 0.1 K/UL (ref 0.1–1.3)
MONOCYTES NFR BLD: 2 % (ref 4–12)
NEUTS SEG # BLD: 5.5 K/UL (ref 1.7–8.2)
NEUTS SEG NFR BLD: 87 % (ref 43–78)
NRBC # BLD: 0 K/UL (ref 0–0.2)
PLATELET # BLD AUTO: 124 K/UL (ref 150–450)
PMV BLD AUTO: 9.9 FL (ref 9.4–12.3)
POTASSIUM SERPL-SCNC: 3.6 MMOL/L (ref 3.5–5.1)
PROT SERPL-MCNC: 7 G/DL (ref 6.3–8.2)
RBC # BLD AUTO: 2.63 M/UL (ref 4.05–5.2)
SODIUM SERPL-SCNC: 135 MMOL/L (ref 136–145)
WBC # BLD AUTO: 6.4 K/UL (ref 4.3–11.1)

## 2021-07-07 PROCEDURE — 83735 ASSAY OF MAGNESIUM: CPT

## 2021-07-07 PROCEDURE — 85025 COMPLETE CBC W/AUTO DIFF WBC: CPT

## 2021-07-07 PROCEDURE — 96368 THER/DIAG CONCURRENT INF: CPT

## 2021-07-07 PROCEDURE — 96375 TX/PRO/DX INJ NEW DRUG ADDON: CPT

## 2021-07-07 PROCEDURE — 96417 CHEMO IV INFUS EACH ADDL SEQ: CPT

## 2021-07-07 PROCEDURE — 74011250636 HC RX REV CODE- 250/636: Performed by: NURSE PRACTITIONER

## 2021-07-07 PROCEDURE — 96367 TX/PROPH/DG ADDL SEQ IV INF: CPT

## 2021-07-07 PROCEDURE — 36591 DRAW BLOOD OFF VENOUS DEVICE: CPT

## 2021-07-07 PROCEDURE — 74011000258 HC RX REV CODE- 258: Performed by: NURSE PRACTITIONER

## 2021-07-07 PROCEDURE — 80053 COMPREHEN METABOLIC PANEL: CPT

## 2021-07-07 PROCEDURE — 96413 CHEMO IV INFUSION 1 HR: CPT

## 2021-07-07 RX ORDER — SODIUM CHLORIDE 9 MG/ML
25 INJECTION, SOLUTION INTRAVENOUS CONTINUOUS
Status: ACTIVE | OUTPATIENT
Start: 2021-07-07 | End: 2021-07-07

## 2021-07-07 RX ORDER — SODIUM CHLORIDE 0.9 % (FLUSH) 0.9 %
10 SYRINGE (ML) INJECTION AS NEEDED
Status: ACTIVE | OUTPATIENT
Start: 2021-07-07 | End: 2021-07-07

## 2021-07-07 RX ORDER — MAGNESIUM SULFATE HEPTAHYDRATE 40 MG/ML
2 INJECTION, SOLUTION INTRAVENOUS ONCE
Status: COMPLETED | OUTPATIENT
Start: 2021-07-07 | End: 2021-07-07

## 2021-07-07 RX ORDER — LORAZEPAM 2 MG/ML
1 INJECTION INTRAMUSCULAR ONCE
Status: COMPLETED | OUTPATIENT
Start: 2021-07-07 | End: 2021-07-07

## 2021-07-07 RX ORDER — SODIUM CHLORIDE 0.9 % (FLUSH) 0.9 %
10 SYRINGE (ML) INJECTION AS NEEDED
Status: DISCONTINUED | OUTPATIENT
Start: 2021-07-07 | End: 2021-07-09 | Stop reason: HOSPADM

## 2021-07-07 RX ADMIN — SODIUM CHLORIDE 25 ML/HR: 9 INJECTION, SOLUTION INTRAVENOUS at 11:00

## 2021-07-07 RX ADMIN — LORAZEPAM 1 MG: 2 INJECTION INTRAMUSCULAR; INTRAVENOUS at 10:49

## 2021-07-07 RX ADMIN — DOCETAXEL ANHYDROUS 112 MG: 10 INJECTION, SOLUTION INTRAVENOUS at 11:33

## 2021-07-07 RX ADMIN — Medication 10 ML: at 09:41

## 2021-07-07 RX ADMIN — Medication 10 ML: at 13:18

## 2021-07-07 RX ADMIN — CARBOPLATIN 675 MG: 10 INJECTION, SOLUTION INTRAVENOUS at 12:36

## 2021-07-07 RX ADMIN — MAGNESIUM SULFATE HEPTAHYDRATE 2 G: 40 INJECTION, SOLUTION INTRAVENOUS at 11:21

## 2021-07-07 RX ADMIN — FOSAPREPITANT 150 MG: 150 INJECTION, POWDER, LYOPHILIZED, FOR SOLUTION INTRAVENOUS at 10:55

## 2021-07-07 RX ADMIN — Medication 10 ML: at 10:29

## 2021-07-07 NOTE — PROGRESS NOTES
7/7/2021 Saw the patient with Alan Heimlich NP. She is tearful and anxious today. She will proceed with reduced dose and we will add udenyca this cycle as well. She took ativan this morning but we will give additional dose in infusion. No additional fever since hospitalization. She is eating and drinking fairly well. She has intermittent tingling in her fingers and toes. We will refer her to radiation to discuss plan per her request. She will plan to follow up with Dr Ladarius Nguyễn on 7/28. Will continue to follow.

## 2021-07-07 NOTE — PROGRESS NOTES
Arrived to the [facility]. Carbo and Taxotere completed. Patient tolerated well. Any issues or concerns during appointment: none. Patient aware of next infusion appointment on 07/08/2021 (date) at [phone] (time). Discharged ambulatory with mom.

## 2021-07-07 NOTE — PROGRESS NOTES
Patient arrived to port lab for port access and lab draw   [address] accessed and labs drawn per protocol   *Port remains accessed for infusion  Patient discharged from port lab ambulatory*

## 2021-07-08 ENCOUNTER — HOSPITAL ENCOUNTER (OUTPATIENT)
Dept: INFUSION THERAPY | Age: 41
Discharge: HOME | End: 2021-07-08
Payer: COMMERCIAL

## 2021-07-08 VITALS
RESPIRATION RATE: 18 BRPM | OXYGEN SATURATION: 100 % | HEART RATE: 99 BPM | SYSTOLIC BLOOD PRESSURE: 126 MMHG | DIASTOLIC BLOOD PRESSURE: 77 MMHG | TEMPERATURE: 98.6 F

## 2021-07-08 DIAGNOSIS — Z17.1 MALIGNANT NEOPLASM OF LOWER-INNER QUADRANT OF LEFT BREAST IN FEMALE, ESTROGEN RECEPTOR NEGATIVE (HCC): ICD-10-CM

## 2021-07-08 DIAGNOSIS — C50.312 MALIGNANT NEOPLASM OF LOWER-INNER QUADRANT OF LEFT BREAST IN FEMALE, ESTROGEN RECEPTOR NEGATIVE (HCC): ICD-10-CM

## 2021-07-08 DIAGNOSIS — E83.42 HYPOMAGNESEMIA: Primary | ICD-10-CM

## 2021-07-08 PROCEDURE — 74011250636 HC RX REV CODE- 250/636: Performed by: NURSE PRACTITIONER

## 2021-07-08 PROCEDURE — 96372 THER/PROPH/DIAG INJ SC/IM: CPT

## 2021-07-08 RX ADMIN — PEGFILGRASTIM-CBQV 6 MG: 6 INJECTION, SOLUTION SUBCUTANEOUS at 17:42

## 2021-07-08 NOTE — PROGRESS NOTES
Pt. Discharged ambulatory. Tolerated injection well. No distress noted. To call physician with any problems or concerns. Understanding voiced. To return to Infusions on 7/28/21.

## 2021-07-13 ENCOUNTER — HOSPITAL ENCOUNTER (OUTPATIENT)
Dept: RADIATION ONCOLOGY | Age: 41
Discharge: HOME | End: 2021-07-13
Payer: COMMERCIAL

## 2021-07-13 VITALS
DIASTOLIC BLOOD PRESSURE: 76 MMHG | BODY MASS INDEX: 27.88 KG/M2 | WEIGHT: 162.4 LBS | SYSTOLIC BLOOD PRESSURE: 118 MMHG | HEART RATE: 130 BPM | TEMPERATURE: 98.6 F | OXYGEN SATURATION: 99 %

## 2021-07-13 PROCEDURE — 99211 OFF/OP EST MAY X REQ PHY/QHP: CPT

## 2021-07-13 NOTE — PROGRESS NOTES
Pt is here today for the initial RT consult with Dr. Theresa Huertas for left triple negative breast cancer. Pt was diagnosed 7/2016 with breast cancer--she underwent bilateral mastectomies, and ddAC-T chemo, but did not have RT at that time. The cancer recently recurred as high grade, triple negative on her LCW. She is followed in Med Onc by Dr. Valerio Cowart. Pt has received 4/6 cycles of Taxotere/Carbo, but because of severe side effects pt will not complete the last 2 cycles of chemo, per Dr. Valerio Cowart. Pt has consulted at [facility] and has not made any decisions about where she will receive RT. An overview of RT and the Skin Care Guidelines handout were given. Pt was instructed not to shave under her left arm while receiving RT. She is accompanied today by her . Pt has decided to receive RT here at [facility]. She is aware of the 7/27/21 CT/Sim appt. RT consents were signed.

## 2021-07-13 NOTE — PROGRESS NOTES
Patient: Alonzo Field MRN: [id number]  SSN: [id number]    YOB: 1980  Age: 46 y.o. Sex: female      Other Providers:  Dr. Domo Ferrara, Dr. Yoandy Pedroza: Left chest wall nodule    DIAGNOSIS: pT2N0 breast cancer    PREVIOUS RADIATION TREATMENT:  1) None    HISTORY OF PRESENT ILLNESS:  Alonzo Field is a 46 y.o. female who I am seeing at the request of Dr. Domo Ferrraa. Ms. Estelle Goodpasture has a history of breat cancer diagnosed after she noted a lump in the inner lower quadrant of her left breast in July 2016. She underwent a diagnostic mammogram and ultrasound which demonstrated an irregular, heterogeneously hypoechoic mass with spiculated margins measuring 1.7 x 1.3 x 1.8cm. Biopsy 7/26/2016 demonstrated infiltrating ductal carcinoma, intermediate grade, without significant in situ component or LVI. The lesion was ER positive (2%), LA negative, HER2/ noemi negative (1+). On 8/30/2016 she underwent a bilateral mastectomy by Dr. Sterling Lewis and tissue expander insertion by Dr. Arjun Fofana which confirmed invasive ductal carcinoma in the left breast, 2.6cm in greatest dimension, high grade, focally involving the anterior margin. One sentinel lymph node was negative. The left breast was negative for disease. She was then treated with ddAC followed by T and tamoxifen was started. She underwent a bilateral removal of tissue expanders and placement of permanent gel implant by Dr. Arjun Fofana on 4/5/2017. In 02/2021 she noted a new nodule in the left breast. An MRI 2/26/2021 demonstrated an enhancing mass in the medial left breast. Mammogram and ultrasound 3/2/2021 confirmed an indeterminate mass at the 8:00 position in the left breast. Biopsy 3/2/2021 demonstrated infiltrating ductal carcinoma, high grade (poorly differentiated), definite in situ component and LVI not identified, ER negative (0.9%), LA negative (0%), HER2/noemi negative (0).  Dr. Sterling Lewis performed wide resection and axillary dissection and  Josseline Wells performed a complex closure on 3/31/2021. The recurrence measured 1.6 x 1.5 x 1.5cm and was <0.1cm from the marked deep margin with a focus of LVI. 9 lymph nodes were negative for metastatic tumor. She has completed 4 cycles of Taxotere/ carboplatin on 2021. This has been complicated by neutropenic fever resulting in hospitalization as well as significant and ongoing bone pain most pronounced in the lower back and hips. She reports persistent low energy but has a good appetite and denies nausea, vomiting, and headaches. PAST MEDICAL HISTORY:    Past Medical History:   Diagnosis Date    [facility] ([facility] [address]     Cancer ([facility] [address] dx--16    left breast cancer--- surgery and chemo     MRSA infection 2014    LEFT EAR     The patient denies history of collagen vascular diseases, pacemaker insertion, and prior radiation. See HPI for details of prior chemotherapy.      PAST SURGICAL HISTORY:   Past Surgical History:   Procedure Laterality Date    HX BREAST BIOPSY Left 2021    HX BREAST LUMPECTOMY Left 3/31/2021    LEFT BREAST LUMPECTOMY performed by Preston Claude, MD at [facility] MAIN OR    HX BREAST RECONSTRUCTION Bilateral 2016    BREAST TISSUE EXPANDER INSERTION AND NIPPLE SPARING/ BILATERAL  performed by Alexy Godfrey MD at [address] [place] [address] HX BREAST RECONSTRUCTION Bilateral 2017    BILATERAL BREAST TISSUE EXPANDER REMOVAL /PLACEMENT OF PERMANENT IMPLANTS performed by Alexy Godfrey MD at [address] [place] [address] HX BREAST RECONSTRUCTION Bilateral 2020    BILATERAL REVISION OF BREAST RECONSTRUCTION/ performed by Star Adam MD at [address] [place] [address] HX BREAST RECONSTRUCTION Left 3/31/2021    LEFT BREAST COMPLEX CLOSURE performed by Star Adam MD at [facility] MAIN OR    HX CERVICAL POLYPECTOMY      UTERINE    HX  SECTION  2012    x 1     HX KNEE ARTHROSCOPY Right 2004    HX MASTECTOMY Bilateral 2016    BREAST MASTECTOMY SIMPLE BILATERAL performed by Ora Cleary MD at [address] HX ORTHOPAEDIC Right 2013    cyst removed from foot    HX ORTHOPAEDIC Right 08/15/2019    RIGHT KIDNER PROCEDURE CHOICE ANES (Right Foot)    HX OTHER SURGICAL  01/14/2019    RIGHT FOOT EXCISION SOFT TISSUE MASS (Right Foot)     HX VASCULAR ACCESS  2016    life port, and removed    HX WISDOM TEETH EXTRACTION      IR INSERT TUNL CVC W PORT OVER 5 YEARS  4/15/2021     MEDICATIONS:     Current Outpatient Medications:     levoFLOXacin (Levaquin) 500 mg tablet, Take 1 Tablet by mouth daily. , Disp: 7 Tablet, Rfl: 0    magnesium oxide (MAG-OX) 400 mg tablet, Take 1 Tablet by mouth three (3) times daily. , Disp: 21 Tablet, Rfl: 0    potassium chloride (K-DUR, KLOR-CON) 20 mEq tablet, Take 2 Tablets by mouth two (2) times a day., Disp: 20 Tablet, Rfl: 0    dicyclomine (BENTYL) 10 mg/5 mL soln oral solution, Take 10 mL by mouth four (4) times daily. , Disp: 280 mL, Rfl: 0    diclofenac (VOLTAREN XR) 100 mg ER tablet, Take 1 Tablet by mouth daily. , Disp: 30 Tablet, Rfl: 1    dexAMETHasone (Decadron) 4 mg tablet, Take 2 tablets twice a day - day before, day of, and day after chemo treatment, Disp: 72 Tab, Rfl: 0    LORazepam (ATIVAN) 1 mg tablet, Take 1 Tab by mouth every four (4) hours as needed for Anxiety. Max Daily Amount: 6 mg. As needed for nausea, vomiting or anxiety, Disp: 60 Tab, Rfl: 1    lidocaine-prilocaine (EMLA) topical cream, Apply  to affected area as needed for Pain. Place over port site 30-45 minutes prior to chemotherapy, Disp: 30 g, Rfl: 1    clindamycin (CLINDAGEL) 1 % topical gel, 1 APPLICATION ONCE A DAY TO FACE EXTERNALLY 30 DAYS, Disp: , Rfl:     LEVONORGESTREL (MIRENA IU), by IntraUTERine route.  Indications: placed 2013, Disp: , Rfl:     ALLERGIES:   Allergies   Allergen Reactions    Lortab [Hydrocodone-Acetaminophen] Itching    Mucinex [Guaifenesin] Hives    Zofran [Ondansetron Hcl (Pf)] Unknown (comments)     \"severe migraines\" SOCIAL HISTORY:   Social History     Socioeconomic History    Marital status:      Spouse name: Not on file    Number of children: Not on file    Years of education: Not on file    Highest education level: Not on file   Occupational History    Not on file   Tobacco Use    Smoking status: Never Smoker    Smokeless tobacco: Never Used   Substance and Sexual Activity    Alcohol use: Yes     Alcohol/week: 0.0 standard drinks     Comment: rare    Drug use: No    Sexual activity: Yes     Birth control/protection: I.U.D. Other Topics Concern    Not on file   Social History Narrative    Not on file     Social Determinants of Health     Financial Resource Strain:     Difficulty of Paying Living Expenses:    Food Insecurity:     Worried About Running Out of Food in the Last Year:     [address] in the Last Year:    Transportation Needs:     Lack of Transportation (Medical):      Lack of Transportation (Non-Medical):    Physical Activity:     Days of Exercise per Week:     Minutes of Exercise per Session:    Stress:     Feeling of Stress :    Social Connections:     Frequency of Communication with Friends and Family:     Frequency of Social Gatherings with Friends and Family:     Attends [employer] Services:     Active Member of Clubs or Organizations:     Attends Club or Organization Meetings:     Marital Status:    Intimate Partner Violence:     Fear of Current or Ex-Partner:     Emotionally Abused:     Physically Abused:     Sexually Abused:        FAMILY HISTORY:   Family History   Problem Relation Age of Onset    Hypertension Mother     Cancer Sister         THYROID    Bipolar Disorder Sister     Diabetes Paternal Grandmother     Stroke Paternal Grandmother     Heart Attack Maternal Grandfather     Heart Disease Maternal Grandfather        REVIEW OF SYSTEMS:   A full 12-point review of systems was completed and was negative unless noted in the history of present illness. PHYSICAL EXAMINATION:   ECOG Performance status 1  VITAL SIGNS:   Visit Vitals  /76   Pulse (!) 130   Temp 98.6 °F (37 °C)   Wt 73.7 kg (162 lb 6.4 oz)   SpO2 99%   BMI 27.88 kg/m²        General: well developed/nourished adult Female in no acute distress; appears stated age  [de-identified]: normocephalic, atraumatic; EOMI  Neck: supple with full ROM; no cervical lymphadenopathy  Extremities: no cyanosis, clubbing, or edema  Musculoskeletal: mobility intact x4; normal ROM in all joints  Skin: no skin lesions identified  Neuro: AOx3; sensation intact x 4; CNII-XII grossly intact  Psych: appropriate affect, insight, and judgement  GI: abdomen soft, non-distended  : not indicated   Breast: Breasts symmetric bilaterally without skin changes or nipple discharge. Left breast iraida-areolar and axillary incisions well healed without palpable mass or tenderness to palpation. Right breast mastectomy incision well healed without palpable mass or tenderness to palpation. PATHOLOGY:    I personally reviewed the pathology reports as documented in the HPI.     LABORATORY:   Lab Results   Component Value Date/Time    Sodium 135 (L) 07/07/2021 09:31 AM    Potassium 3.6 07/07/2021 09:31 AM    Chloride 104 07/07/2021 09:31 AM    CO2 24 07/07/2021 09:31 AM    Anion gap 7 07/07/2021 09:31 AM    Glucose 200 (H) 07/07/2021 09:31 AM    BUN 14 07/07/2021 09:31 AM    Creatinine 0.60 07/07/2021 09:31 AM    GFR est AA >60 07/07/2021 09:31 AM    GFR est non-AA >60 07/07/2021 09:31 AM    Calcium 9.4 07/07/2021 09:31 AM    Magnesium 1.4 (L) 07/07/2021 09:31 AM    Albumin 3.6 07/07/2021 09:31 AM    Protein, total 7.0 07/07/2021 09:31 AM    Globulin 3.4 07/07/2021 09:31 AM    A-G Ratio 1.1 (L) 07/07/2021 09:31 AM    ALT (SGPT) 78 (H) 07/07/2021 09:31 AM     Lab Results   Component Value Date/Time    WBC 6.4 07/07/2021 09:31 AM    HGB 8.4 (L) 07/07/2021 09:31 AM    HCT 24.7 (L) 07/07/2021 09:31 AM    PLATELET 028 (L) 52/57/6162 09:31 AM Hgb, External 12.8 12/01/2011 12:00 AM    Hct, External 37.6 12/01/2011 12:00 AM    Platelet cnt., External 210 12/01/2011 12:00 AM     RADIOLOGY:    I personally reviewed the images and agree with the findings as documented in the HPI. IMPRESSION:  Briseida Ortiz is a 46 y.o. female with left breast pT2N0 invasive ductal carcinoma diagnosed originally in 2016 s/p bilateral mastectomy with recurrence in the left chest wall In 2021 s/p wide resection and negative ALND presenting for discussion of radiation therapy. I had a long discussion with Briseida Ortiz regarding treatment options, specifically adjuvant radiation therapy to the chest wall and comprehensive regional lymph nodes in order to minimize the risk of loco-regional recurrence. I reviewed the logistics of treatment and potential acute and late toxicities including dermatitis, esophagitis, fatigue, pneumonitis, lymphedema, hypothyroidism, contracture or loss of her left breast implant, and damage to the heart as well as use of breath hold during treatment to minimize this risk. I discussed that even with radiation therapy a risk of local and distant recurrence remains. Ms. Mensah Neither has been to MD Lamine Vincent for a second opinion, where a dose-escalation regimen of 54Gy to the nodes and boost to 66Gy to are of recurrence was recommended Fernando Oneal et al, Radiation Oncology 2013). Given the lack of benefit seen in their publication and risks associated with increasing dose to the lungs and heart I have recommended a standard treatment course to 60Gy and will reach out to other facilities for additional opinions regarding dose escalation in this setting. I also reviewed the potential role of proton therapy for locally advanced breast cancer should a photon plan using breath hold not provide adequate target coverage or appropriately spare normal structures.  Briseida Ortiz and her family had the opportunity to ask questions which appeared to be answered to their satisfaction. They are in agreement with radiation treatment planning in approximately two weeks and were encouraged to contact our department with any additional questions or concerns prior to this appointment. PLAN:    1) Consented patient for treatment with external beam radiation after discussing risk, benefits, and side effects from treatment.   2) CT simulation to be scheduled    Pedro Simmons MD   July 13, 2021

## 2021-07-27 ENCOUNTER — HOSPITAL ENCOUNTER (OUTPATIENT)
Dept: RADIATION ONCOLOGY | Age: 41
Discharge: HOME | End: 2021-07-27
Payer: COMMERCIAL

## 2021-07-27 VITALS
DIASTOLIC BLOOD PRESSURE: 75 MMHG | HEART RATE: 85 BPM | OXYGEN SATURATION: 100 % | TEMPERATURE: 98.7 F | SYSTOLIC BLOOD PRESSURE: 109 MMHG

## 2021-07-27 PROCEDURE — 77290 THER RAD SIMULAJ FIELD CPLX: CPT

## 2021-07-27 PROCEDURE — 77332 RADIATION TREATMENT AID(S): CPT

## 2021-07-28 ENCOUNTER — PATIENT OUTREACH (OUTPATIENT)
Dept: CASE MANAGEMENT | Age: 41
End: 2021-07-28

## 2021-07-28 ENCOUNTER — HOSPITAL ENCOUNTER (OUTPATIENT)
Dept: LAB | Age: 41
Discharge: HOME | End: 2021-07-28

## 2021-07-28 NOTE — PROGRESS NOTES
7/28/2021 The patient called and she is having significant hot flashes. We discussed her taking Effexor and she is agreeable. We will send in 37.5mg for one week and then increase to 75 mg after that. Both prescriptions sent.

## 2021-08-05 ENCOUNTER — HOSPITAL ENCOUNTER (OUTPATIENT)
Dept: INTERVENTIONAL RADIOLOGY/VASCULAR | Age: 41
Discharge: HOME | End: 2021-08-05
Attending: INTERNAL MEDICINE
Payer: COMMERCIAL

## 2021-08-05 VITALS
HEART RATE: 89 BPM | RESPIRATION RATE: 16 BRPM | SYSTOLIC BLOOD PRESSURE: 110 MMHG | DIASTOLIC BLOOD PRESSURE: 67 MMHG | OXYGEN SATURATION: 99 % | TEMPERATURE: 98.1 F

## 2021-08-05 DIAGNOSIS — Z17.1 MALIGNANT NEOPLASM OF LOWER-INNER QUADRANT OF LEFT BREAST IN FEMALE, ESTROGEN RECEPTOR NEGATIVE (HCC): ICD-10-CM

## 2021-08-05 DIAGNOSIS — C50.312 MALIGNANT NEOPLASM OF LOWER-INNER QUADRANT OF LEFT BREAST IN FEMALE, ESTROGEN RECEPTOR NEGATIVE (HCC): ICD-10-CM

## 2021-08-05 PROCEDURE — 36590 REMOVAL TUNNELED CV CATH: CPT

## 2021-08-05 PROCEDURE — 77030010507 HC ADH SKN DERMBND J&J -B

## 2021-08-05 PROCEDURE — 77030031139 HC SUT VCRL2 J&J -A

## 2021-08-05 PROCEDURE — 77030031131 HC SUT MXN P COVD -B

## 2021-08-05 RX ORDER — LIDOCAINE HYDROCHLORIDE AND EPINEPHRINE 20; 10 MG/ML; UG/ML
20-200 INJECTION, SOLUTION INFILTRATION; PERINEURAL ONCE
Status: DISCONTINUED | OUTPATIENT
Start: 2021-08-05 | End: 2021-08-06 | Stop reason: HOSPADM

## 2021-08-05 NOTE — DISCHARGE INSTRUCTIONS
[address]  Department of Interventional Radiology  [facility]  [phone] Office  [phone] Fax    DRAIN/PORT/CATHETER REMOVAL  DISCHARGE INSTRUCTIONS    General Information:     Your doctor has ordered for us to remove your drain, port, or catheter. This could be that you do not need it anymore, it is not doing its job, your physician has decided on another plan for your treatment and/or it may need replacing. Home Care Instructions: You can resume your regular diet and medication regimen. Do not drink alcohol, drive, or make any important legal decisions in the next 24 hours. Do not lift anything heavier than a gallon of milk, or do anything strenuous for the next 24 hours. You will notice a dressing over the site of the removal. This dressing should stay in place until the site is healed. The dressing should be changed at least every 48 hours. You should change the dressing sooner if it becomes soiled or wet. The nurse who discharges you to home should review with you any wound care instructions. Resume your normal level of activity slowly. You may shower after 24 hours, but do not take a bath, swim or immerse yourself in water until the site has healed, and keep the dressing dry with plastic wrap while showering. The site may ooze for a couple of days. This drainage should lessen with each passing day. Call If:     You should call your Physician and/or the Radiology Nurse if you have any bleeding other than a small spot on your bandage. Call if you have any signs of infection, fever, or increased pain at the site of the tube. Call if the oozing increases, if it changes color, or begins to have an odor. Follow-Up Instructions: Please see your ordering doctor as he/she has requested. To Reach Us: If you have any questions about your procedure, please call the Interventional Radiology department at [phone].  After business hours (5pm) and weekends, call the answering service at [phone] and ask for the Radiologist on call to be paged. Si tiene Preguntas acerca del procedimiento, por favor llame al departamento de Radiología Intervencional al [phone]. Después de horas de oficina (5 pm) y los fines de [place], llamar al Carlene Brantley al [phone] y pregunte por el Radiologo de Iliana Walton. Interventional Radiology General Nurse Discharge    After general anesthesia or intravenous sedation, for 24 hours or while taking prescription Narcotics:  · Limit your activities  · Do not drive and operate hazardous machinery  · Do not make important personal or business decisions  · Do  not drink alcoholic beverages  · If you have not urinated within 8 hours after discharge, please contact your surgeon on call. * Please give a list of your current medications to your Primary Care Provider. * Please update this list whenever your medications are discontinued, doses are     changed, or new medications (including over-the-counter products) are added. * Please carry medication information at all times in case of emergency situations. These are general instructions for a healthy lifestyle:    No smoking/ No tobacco products/ Avoid exposure to second hand smoke  Surgeon General's Warning:  Quitting smoking now greatly reduces serious risk to your health. Obesity, smoking, and sedentary lifestyle greatly increases your risk for illness  A healthy diet, regular physical exercise & weight monitoring are important for maintaining a healthy lifestyle    You may be retaining fluid if you have a history of heart failure or if you experience any of the following symptoms:  Weight gain of 3 pounds or more overnight or 5 pounds in a week, increased swelling in our hands or feet or shortness of breath while lying flat in bed.   Please call your doctor as soon as you notice any of these symptoms; do not wait until your next office visit. Recognize signs and symptoms of STROKE:  F-face looks uneven    A-arms unable to move or move unevenly    S-speech slurred or non-existent    T-time-call 911 as soon as signs and symptoms begin-DO NOT go       Back to bed or wait to see if you get better-TIME IS BRAIN.

## 2021-08-09 ENCOUNTER — HOSPITAL ENCOUNTER (OUTPATIENT)
Dept: RADIATION ONCOLOGY | Age: 41
Discharge: HOME | End: 2021-08-09
Payer: COMMERCIAL

## 2021-08-09 PROCEDURE — 77334 RADIATION TREATMENT AID(S): CPT

## 2021-08-09 PROCEDURE — 77300 RADIATION THERAPY DOSE PLAN: CPT

## 2021-08-09 PROCEDURE — 77399 UNLISTED PX MED RADJ PHYSICS: CPT

## 2021-08-09 PROCEDURE — 77295 3-D RADIOTHERAPY PLAN: CPT

## 2021-08-12 ENCOUNTER — HOSPITAL ENCOUNTER (OUTPATIENT)
Dept: RADIATION ONCOLOGY | Age: 41
Discharge: HOME | End: 2021-08-12
Payer: COMMERCIAL

## 2021-08-12 PROCEDURE — 77387 GUIDANCE FOR RADJ TX DLVR: CPT

## 2021-08-12 PROCEDURE — 77412 RADIATION TX DELIVERY LVL 3: CPT

## 2021-08-12 PROCEDURE — 77280 THER RAD SIMULAJ FIELD SMPL: CPT

## 2021-08-13 ENCOUNTER — HOSPITAL ENCOUNTER (OUTPATIENT)
Dept: RADIATION ONCOLOGY | Age: 41
Discharge: HOME | End: 2021-08-13
Payer: COMMERCIAL

## 2021-08-13 VITALS
OXYGEN SATURATION: 100 % | DIASTOLIC BLOOD PRESSURE: 73 MMHG | TEMPERATURE: 98.3 F | BODY MASS INDEX: 27.88 KG/M2 | WEIGHT: 162.4 LBS | SYSTOLIC BLOOD PRESSURE: 126 MMHG | HEART RATE: 85 BPM

## 2021-08-13 PROCEDURE — 77387 GUIDANCE FOR RADJ TX DLVR: CPT

## 2021-08-13 PROCEDURE — 77412 RADIATION TX DELIVERY LVL 3: CPT

## 2021-08-13 NOTE — PROGRESS NOTES
Patient: Denisse Sanderson MRN: [id number]  SSN: [id number]    YOB: 1980  Age: 46 y.o. Sex: female      DIAGNOSIS:  Recurrent breast cancer    TREATMENT SITE:  L chest wall    DOSE and FRACTIONATION:  2 of 30 fractions; 400 of 6000cGy    INTERVAL HISTORY:  Denisse Sanderson is a 46 y.o. female being treated for recurrent L breast cancer. Week 1: No complaints. OBJECTIVE:  NAD  Visit Vitals  /73   Pulse 85   Temp 98.3 °F (36.8 °C)   Wt 73.7 kg (162 lb 6.4 oz)   SpO2 100%   BMI 27.88 kg/m²       Lab Results   Component Value Date/Time    Sodium 139 07/28/2021 07:50 AM    Potassium 3.8 07/28/2021 07:50 AM    Chloride 106 07/28/2021 07:50 AM    CO2 25 07/28/2021 07:50 AM    Anion gap 8 07/28/2021 07:50 AM    Glucose 93 07/28/2021 07:50 AM    BUN 9 07/28/2021 07:50 AM    Creatinine 0.60 07/28/2021 07:50 AM    GFR est AA >60 07/28/2021 07:50 AM    GFR est non-AA >60 07/28/2021 07:50 AM    Calcium 8.5 07/28/2021 07:50 AM    Magnesium 1.8 07/28/2021 07:50 AM    Albumin 3.5 07/28/2021 07:50 AM    Protein, total 6.3 07/28/2021 07:50 AM    Globulin 2.8 07/28/2021 07:50 AM    A-G Ratio 1.3 07/28/2021 07:50 AM    ALT (SGPT) 54 07/28/2021 07:50 AM     Lab Results   Component Value Date/Time    WBC 4.6 07/28/2021 07:50 AM    HGB 7.9 (L) 07/28/2021 07:50 AM    HCT 24.2 (L) 07/28/2021 07:50 AM    PLATELET 701 (L) 19/68/1359 07:50 AM    Hgb, External 12.8 12/01/2011 12:00 AM    Hct, External 37.6 12/01/2011 12:00 AM    Platelet cnt., External 210 12/01/2011 12:00 AM       ASSESSMENT and PLAN:  Denisse Sanderson is tolerating radiation as anticipated for the current dose and fraction. We will continue on as planned with another treatment visit anticipated next week.     - Reviewed moisturizer for skin care and anticipated toxicities of treatment    Michael Delgado MD   August 13, 2021

## 2021-08-16 ENCOUNTER — HOSPITAL ENCOUNTER (OUTPATIENT)
Dept: RADIATION ONCOLOGY | Age: 41
Discharge: HOME | End: 2021-08-16
Payer: COMMERCIAL

## 2021-08-16 PROCEDURE — 77387 GUIDANCE FOR RADJ TX DLVR: CPT

## 2021-08-16 PROCEDURE — 77412 RADIATION TX DELIVERY LVL 3: CPT

## 2021-08-17 ENCOUNTER — HOSPITAL ENCOUNTER (OUTPATIENT)
Dept: RADIATION ONCOLOGY | Age: 41
Discharge: HOME | End: 2021-08-17
Payer: COMMERCIAL

## 2021-08-17 PROCEDURE — 77387 GUIDANCE FOR RADJ TX DLVR: CPT

## 2021-08-17 PROCEDURE — 77412 RADIATION TX DELIVERY LVL 3: CPT

## 2021-08-17 RX ORDER — SODIUM CHLORIDE 0.9 % (FLUSH) 0.9 %
10 SYRINGE (ML) INJECTION ONCE
Status: CANCELLED | OUTPATIENT
Start: 2021-08-17 | End: 2021-08-18

## 2021-08-18 ENCOUNTER — HOSPITAL ENCOUNTER (OUTPATIENT)
Dept: RADIATION ONCOLOGY | Age: 41
Discharge: HOME | End: 2021-08-18
Payer: COMMERCIAL

## 2021-08-18 ENCOUNTER — HOSPITAL ENCOUNTER (OUTPATIENT)
Dept: INFUSION THERAPY | Age: 41
End: 2021-08-18

## 2021-08-18 PROCEDURE — 77412 RADIATION TX DELIVERY LVL 3: CPT

## 2021-08-18 PROCEDURE — 77387 GUIDANCE FOR RADJ TX DLVR: CPT

## 2021-08-19 ENCOUNTER — HOSPITAL ENCOUNTER (OUTPATIENT)
Dept: RADIATION ONCOLOGY | Age: 41
Discharge: HOME | End: 2021-08-19
Payer: COMMERCIAL

## 2021-08-19 PROCEDURE — 77412 RADIATION TX DELIVERY LVL 3: CPT

## 2021-08-19 PROCEDURE — 77387 GUIDANCE FOR RADJ TX DLVR: CPT

## 2021-08-19 PROCEDURE — 77336 RADIATION PHYSICS CONSULT: CPT

## 2021-08-20 ENCOUNTER — HOSPITAL ENCOUNTER (OUTPATIENT)
Dept: RADIATION ONCOLOGY | Age: 41
Discharge: HOME | End: 2021-08-20
Payer: COMMERCIAL

## 2021-08-20 VITALS
HEART RATE: 72 BPM | BODY MASS INDEX: 27.52 KG/M2 | TEMPERATURE: 98.4 F | WEIGHT: 160.3 LBS | DIASTOLIC BLOOD PRESSURE: 74 MMHG | OXYGEN SATURATION: 100 % | SYSTOLIC BLOOD PRESSURE: 106 MMHG

## 2021-08-20 DIAGNOSIS — C50.312 MALIGNANT NEOPLASM OF LOWER-INNER QUADRANT OF LEFT BREAST IN FEMALE, ESTROGEN RECEPTOR NEGATIVE (HCC): Primary | ICD-10-CM

## 2021-08-20 DIAGNOSIS — Z17.1 MALIGNANT NEOPLASM OF LOWER-INNER QUADRANT OF LEFT BREAST IN FEMALE, ESTROGEN RECEPTOR NEGATIVE (HCC): Primary | ICD-10-CM

## 2021-08-20 PROCEDURE — 77387 GUIDANCE FOR RADJ TX DLVR: CPT

## 2021-08-20 PROCEDURE — 77412 RADIATION TX DELIVERY LVL 3: CPT

## 2021-08-20 RX ORDER — TRAMADOL HYDROCHLORIDE 50 MG/1
50 TABLET ORAL
Qty: 56 TABLET | Refills: 0 | Status: SHIPPED | OUTPATIENT
Start: 2021-08-20 | End: 2021-09-03

## 2021-08-23 ENCOUNTER — HOSPITAL ENCOUNTER (OUTPATIENT)
Dept: RADIATION ONCOLOGY | Age: 41
Discharge: HOME | End: 2021-08-23
Payer: COMMERCIAL

## 2021-08-23 PROCEDURE — 77387 GUIDANCE FOR RADJ TX DLVR: CPT

## 2021-08-23 PROCEDURE — 77412 RADIATION TX DELIVERY LVL 3: CPT

## 2021-08-24 ENCOUNTER — HOSPITAL ENCOUNTER (OUTPATIENT)
Dept: RADIATION ONCOLOGY | Age: 41
Discharge: HOME | End: 2021-08-24
Payer: COMMERCIAL

## 2021-08-24 PROCEDURE — 77412 RADIATION TX DELIVERY LVL 3: CPT

## 2021-08-24 PROCEDURE — 77387 GUIDANCE FOR RADJ TX DLVR: CPT

## 2021-08-25 ENCOUNTER — HOSPITAL ENCOUNTER (OUTPATIENT)
Dept: RADIATION ONCOLOGY | Age: 41
Discharge: HOME | End: 2021-08-25
Payer: COMMERCIAL

## 2021-08-25 PROCEDURE — 77387 GUIDANCE FOR RADJ TX DLVR: CPT

## 2021-08-25 PROCEDURE — 77336 RADIATION PHYSICS CONSULT: CPT

## 2021-08-25 PROCEDURE — 77412 RADIATION TX DELIVERY LVL 3: CPT

## 2021-08-26 ENCOUNTER — HOSPITAL ENCOUNTER (OUTPATIENT)
Dept: RADIATION ONCOLOGY | Age: 41
Discharge: HOME | End: 2021-08-26
Payer: COMMERCIAL

## 2021-08-26 PROCEDURE — 77387 GUIDANCE FOR RADJ TX DLVR: CPT

## 2021-08-26 PROCEDURE — 77412 RADIATION TX DELIVERY LVL 3: CPT

## 2021-08-27 ENCOUNTER — HOSPITAL ENCOUNTER (OUTPATIENT)
Dept: RADIATION ONCOLOGY | Age: 41
Discharge: HOME | End: 2021-08-27
Payer: COMMERCIAL

## 2021-08-27 VITALS
RESPIRATION RATE: 16 BRPM | DIASTOLIC BLOOD PRESSURE: 78 MMHG | SYSTOLIC BLOOD PRESSURE: 118 MMHG | TEMPERATURE: 98.3 F | BODY MASS INDEX: 28.18 KG/M2 | OXYGEN SATURATION: 100 % | WEIGHT: 164.2 LBS | HEART RATE: 81 BPM

## 2021-08-27 PROCEDURE — 77387 GUIDANCE FOR RADJ TX DLVR: CPT

## 2021-08-27 PROCEDURE — 77412 RADIATION TX DELIVERY LVL 3: CPT

## 2021-08-27 NOTE — PROGRESS NOTES
Patient: Carrie Cates MRN: [id number]  SSN: [id number]    YOB: 1980  Age: 46 y.o. Sex: female      DIAGNOSIS:  Recurrent breast cancer    TREATMENT SITE:  L chest wall    DOSE and FRACTIONATION:  12 of 30 fractions; 2400 of 6000cGy    INTERVAL HISTORY:  Carrie Cates is a 46 y.o. female being treated for recurrent L breast cancer. Week 1: No complaints. Week 2: L shoulder stiffness and pain with treatment positioning that lasts throughout the afternoon. Week 3: Shoulder stiffness managed with Tylenol, new onset esophagitis. OBJECTIVE:  NAD  Visit Vitals  /78 (BP 1 Location: Left upper arm, BP Patient Position: Sitting)   Pulse 81   Temp 98.3 °F (36.8 °C)   Resp 16   Wt 74.5 kg (164 lb 3.2 oz)   SpO2 100%   BMI 28.18 kg/m²       Lab Results   Component Value Date/Time    Sodium 139 07/28/2021 07:50 AM    Potassium 3.8 07/28/2021 07:50 AM    Chloride 106 07/28/2021 07:50 AM    CO2 25 07/28/2021 07:50 AM    Anion gap 8 07/28/2021 07:50 AM    Glucose 93 07/28/2021 07:50 AM    BUN 9 07/28/2021 07:50 AM    Creatinine 0.60 07/28/2021 07:50 AM    GFR est AA >60 07/28/2021 07:50 AM    GFR est non-AA >60 07/28/2021 07:50 AM    Calcium 8.5 07/28/2021 07:50 AM    Magnesium 1.8 07/28/2021 07:50 AM    Albumin 3.5 07/28/2021 07:50 AM    Protein, total 6.3 07/28/2021 07:50 AM    Globulin 2.8 07/28/2021 07:50 AM    A-G Ratio 1.3 07/28/2021 07:50 AM    ALT (SGPT) 54 07/28/2021 07:50 AM     Lab Results   Component Value Date/Time    WBC 4.6 07/28/2021 07:50 AM    HGB 7.9 (L) 07/28/2021 07:50 AM    HCT 24.2 (L) 07/28/2021 07:50 AM    PLATELET 351 (L) 38/86/0163 07:50 AM    Hgb, External 12.8 12/01/2011 12:00 AM    Hct, External 37.6 12/01/2011 12:00 AM    Platelet cnt., External 210 12/01/2011 12:00 AM       ASSESSMENT and PLAN:  Carrieleatha Cates is tolerating radiation as anticipated for the current dose and fraction.   We will continue on as planned with another treatment visit anticipated next week.     - Continue Tylenol for stiffness and esophagitis  - Will prescribe MMW for esophagitis  - Tramadol available if symptoms not alleviated with anti-inflammatories and MMW    Tejal Wild MD   August 27, 2021

## 2021-08-30 ENCOUNTER — HOSPITAL ENCOUNTER (OUTPATIENT)
Dept: RADIATION ONCOLOGY | Age: 41
Discharge: HOME | End: 2021-08-30
Payer: COMMERCIAL

## 2021-08-30 PROCEDURE — 77387 GUIDANCE FOR RADJ TX DLVR: CPT

## 2021-08-30 PROCEDURE — 77412 RADIATION TX DELIVERY LVL 3: CPT

## 2021-08-31 ENCOUNTER — HOSPITAL ENCOUNTER (OUTPATIENT)
Dept: RADIATION ONCOLOGY | Age: 41
Discharge: HOME | End: 2021-08-31
Payer: COMMERCIAL

## 2021-08-31 PROCEDURE — 77412 RADIATION TX DELIVERY LVL 3: CPT

## 2021-08-31 PROCEDURE — 77387 GUIDANCE FOR RADJ TX DLVR: CPT

## 2021-09-01 ENCOUNTER — HOSPITAL ENCOUNTER (OUTPATIENT)
Dept: RADIATION ONCOLOGY | Age: 41
Discharge: HOME | End: 2021-09-01
Payer: COMMERCIAL

## 2021-09-01 PROCEDURE — 77336 RADIATION PHYSICS CONSULT: CPT

## 2021-09-01 PROCEDURE — 77387 GUIDANCE FOR RADJ TX DLVR: CPT

## 2021-09-01 PROCEDURE — 77412 RADIATION TX DELIVERY LVL 3: CPT

## 2021-09-02 ENCOUNTER — HOSPITAL ENCOUNTER (OUTPATIENT)
Dept: RADIATION ONCOLOGY | Age: 41
Discharge: HOME | End: 2021-09-02
Payer: COMMERCIAL

## 2021-09-02 PROCEDURE — 77387 GUIDANCE FOR RADJ TX DLVR: CPT

## 2021-09-02 PROCEDURE — 77412 RADIATION TX DELIVERY LVL 3: CPT

## 2021-09-03 ENCOUNTER — HOSPITAL ENCOUNTER (OUTPATIENT)
Dept: RADIATION ONCOLOGY | Age: 41
Discharge: HOME | End: 2021-09-03
Payer: COMMERCIAL

## 2021-09-03 VITALS
DIASTOLIC BLOOD PRESSURE: 69 MMHG | BODY MASS INDEX: 28.1 KG/M2 | HEART RATE: 78 BPM | SYSTOLIC BLOOD PRESSURE: 109 MMHG | OXYGEN SATURATION: 99 % | WEIGHT: 163.7 LBS | TEMPERATURE: 98.5 F

## 2021-09-03 PROCEDURE — 77412 RADIATION TX DELIVERY LVL 3: CPT

## 2021-09-03 PROCEDURE — 77387 GUIDANCE FOR RADJ TX DLVR: CPT

## 2021-09-03 NOTE — PROGRESS NOTES
Patient: Michelle Myrick MRN: [id number]  SSN: [id number]    YOB: 1980  Age: 46 y.o. Sex: female      DIAGNOSIS:  Recurrent breast cancer    TREATMENT SITE:  L chest wall    DOSE and FRACTIONATION:  17 of 30 fractions; 3400 of 6000cGy    INTERVAL HISTORY:  Michelle Myrick is a 46 y.o. female being treated for recurrent L breast cancer. Week 1: No complaints. Week 2: L shoulder stiffness and pain with treatment positioning that lasts throughout the afternoon. Week 3: Shoulder stiffness managed with Tylenol, new onset esophagitis. Week 4: no complaints    OBJECTIVE:  NAD  Visit Vitals  /69 (BP 1 Location: Right upper arm, BP Patient Position: Sitting)   Pulse 78   Temp 98.5 °F (36.9 °C)   Wt 74.3 kg (163 lb 11.2 oz)   SpO2 99%   BMI 28.10 kg/m²       Lab Results   Component Value Date/Time    Sodium 139 07/28/2021 07:50 AM    Potassium 3.8 07/28/2021 07:50 AM    Chloride 106 07/28/2021 07:50 AM    CO2 25 07/28/2021 07:50 AM    Anion gap 8 07/28/2021 07:50 AM    Glucose 93 07/28/2021 07:50 AM    BUN 9 07/28/2021 07:50 AM    Creatinine 0.60 07/28/2021 07:50 AM    GFR est AA >60 07/28/2021 07:50 AM    GFR est non-AA >60 07/28/2021 07:50 AM    Calcium 8.5 07/28/2021 07:50 AM    Magnesium 1.8 07/28/2021 07:50 AM    Albumin 3.5 07/28/2021 07:50 AM    Protein, total 6.3 07/28/2021 07:50 AM    Globulin 2.8 07/28/2021 07:50 AM    A-G Ratio 1.3 07/28/2021 07:50 AM    ALT (SGPT) 54 07/28/2021 07:50 AM     Lab Results   Component Value Date/Time    WBC 4.6 07/28/2021 07:50 AM    HGB 7.9 (L) 07/28/2021 07:50 AM    HCT 24.2 (L) 07/28/2021 07:50 AM    PLATELET 076 (L) 77/78/5616 07:50 AM    Hgb, External 12.8 12/01/2011 12:00 AM    Hct, External 37.6 12/01/2011 12:00 AM    Platelet cnt., External 210 12/01/2011 12:00 AM       ASSESSMENT and PLAN:  Michelle Myrick is tolerating radiation as anticipated for the current dose and fraction.   We will continue on as planned with another treatment visit anticipated next week.       Patsey Shone, MD   September 3, 2021

## 2021-09-06 ENCOUNTER — APPOINTMENT (OUTPATIENT)
Dept: RADIATION ONCOLOGY | Age: 41
End: 2021-09-06
Payer: COMMERCIAL

## 2021-09-07 ENCOUNTER — HOSPITAL ENCOUNTER (OUTPATIENT)
Dept: RADIATION ONCOLOGY | Age: 41
Discharge: HOME | End: 2021-09-07
Payer: COMMERCIAL

## 2021-09-07 PROCEDURE — 77387 GUIDANCE FOR RADJ TX DLVR: CPT

## 2021-09-07 PROCEDURE — 77412 RADIATION TX DELIVERY LVL 3: CPT

## 2021-09-08 ENCOUNTER — HOSPITAL ENCOUNTER (OUTPATIENT)
Dept: RADIATION ONCOLOGY | Age: 41
Discharge: HOME | End: 2021-09-08
Payer: COMMERCIAL

## 2021-09-08 PROCEDURE — 77412 RADIATION TX DELIVERY LVL 3: CPT

## 2021-09-08 PROCEDURE — 77387 GUIDANCE FOR RADJ TX DLVR: CPT

## 2021-09-09 ENCOUNTER — HOSPITAL ENCOUNTER (OUTPATIENT)
Dept: RADIATION ONCOLOGY | Age: 41
Discharge: HOME | End: 2021-09-09
Payer: COMMERCIAL

## 2021-09-09 PROCEDURE — 77412 RADIATION TX DELIVERY LVL 3: CPT

## 2021-09-09 PROCEDURE — 77321 SPECIAL TELETX PORT PLAN: CPT

## 2021-09-09 PROCEDURE — 77334 RADIATION TREATMENT AID(S): CPT

## 2021-09-09 PROCEDURE — 77387 GUIDANCE FOR RADJ TX DLVR: CPT

## 2021-09-10 ENCOUNTER — HOSPITAL ENCOUNTER (OUTPATIENT)
Dept: RADIATION ONCOLOGY | Age: 41
Discharge: HOME | End: 2021-09-10
Payer: COMMERCIAL

## 2021-09-10 VITALS
DIASTOLIC BLOOD PRESSURE: 82 MMHG | WEIGHT: 162.2 LBS | SYSTOLIC BLOOD PRESSURE: 131 MMHG | TEMPERATURE: 98.6 F | OXYGEN SATURATION: 99 % | HEART RATE: 91 BPM | RESPIRATION RATE: 16 BRPM | BODY MASS INDEX: 27.84 KG/M2

## 2021-09-10 PROCEDURE — 77336 RADIATION PHYSICS CONSULT: CPT

## 2021-09-10 PROCEDURE — 77387 GUIDANCE FOR RADJ TX DLVR: CPT

## 2021-09-10 PROCEDURE — 77412 RADIATION TX DELIVERY LVL 3: CPT

## 2021-09-10 NOTE — PROGRESS NOTES
Patient: Sakshi Chen MRN: [id number]  SSN: [id number]    YOB: 1980  Age: 46 y.o. Sex: female      DIAGNOSIS:  Recurrent breast cancer    TREATMENT SITE:  L chest wall    DOSE and FRACTIONATION:  21 of 30 fractions; 4200 of 6000cGy    INTERVAL HISTORY:  Sakshi Chen is a 46 y.o. female being treated for recurrent L breast cancer. Week 1: No complaints. Week 2: L shoulder stiffness and pain with treatment positioning that lasts throughout the afternoon. Week 3: Shoulder stiffness managed with Tylenol, new onset esophagitis. Week 4: no complaints  Week 5: no complaints    OBJECTIVE:  NAD  Visit Vitals  /82 (BP 1 Location: Right upper arm, BP Patient Position: Sitting)   Pulse 91   Temp 98.6 °F (37 °C)   Resp 16   Wt 73.6 kg (162 lb 3.2 oz)   SpO2 99%   BMI 27.84 kg/m²       Lab Results   Component Value Date/Time    Sodium 139 07/28/2021 07:50 AM    Potassium 3.8 07/28/2021 07:50 AM    Chloride 106 07/28/2021 07:50 AM    CO2 25 07/28/2021 07:50 AM    Anion gap 8 07/28/2021 07:50 AM    Glucose 93 07/28/2021 07:50 AM    BUN 9 07/28/2021 07:50 AM    Creatinine 0.60 07/28/2021 07:50 AM    GFR est AA >60 07/28/2021 07:50 AM    GFR est non-AA >60 07/28/2021 07:50 AM    Calcium 8.5 07/28/2021 07:50 AM    Magnesium 1.8 07/28/2021 07:50 AM    Albumin 3.5 07/28/2021 07:50 AM    Protein, total 6.3 07/28/2021 07:50 AM    Globulin 2.8 07/28/2021 07:50 AM    A-G Ratio 1.3 07/28/2021 07:50 AM    ALT (SGPT) 54 07/28/2021 07:50 AM     Lab Results   Component Value Date/Time    WBC 4.6 07/28/2021 07:50 AM    HGB 7.9 (L) 07/28/2021 07:50 AM    HCT 24.2 (L) 07/28/2021 07:50 AM    PLATELET 526 (L) 89/95/6764 07:50 AM    Hgb, External 12.8 12/01/2011 12:00 AM    Hct, External 37.6 12/01/2011 12:00 AM    Platelet cnt., External 210 12/01/2011 12:00 AM       ASSESSMENT and PLAN:  Sakshi Chen is tolerating radiation as anticipated for the current dose and fraction.   We will continue on as planned with another treatment visit anticipated next week.       Sarita Frost MD   September 10, 2021

## 2021-09-13 ENCOUNTER — HOSPITAL ENCOUNTER (OUTPATIENT)
Dept: RADIATION ONCOLOGY | Age: 41
Discharge: HOME | End: 2021-09-13
Payer: COMMERCIAL

## 2021-09-13 PROCEDURE — 77412 RADIATION TX DELIVERY LVL 3: CPT

## 2021-09-13 PROCEDURE — 77387 GUIDANCE FOR RADJ TX DLVR: CPT

## 2021-09-14 ENCOUNTER — HOSPITAL ENCOUNTER (OUTPATIENT)
Dept: RADIATION ONCOLOGY | Age: 41
Discharge: HOME | End: 2021-09-14
Payer: COMMERCIAL

## 2021-09-14 PROCEDURE — 77387 GUIDANCE FOR RADJ TX DLVR: CPT

## 2021-09-14 PROCEDURE — 77412 RADIATION TX DELIVERY LVL 3: CPT

## 2021-09-15 ENCOUNTER — HOSPITAL ENCOUNTER (OUTPATIENT)
Dept: RADIATION ONCOLOGY | Age: 41
Discharge: HOME | End: 2021-09-15
Payer: COMMERCIAL

## 2021-09-15 PROCEDURE — 77387 GUIDANCE FOR RADJ TX DLVR: CPT

## 2021-09-15 PROCEDURE — 77412 RADIATION TX DELIVERY LVL 3: CPT

## 2021-09-15 RX ORDER — SILVER SULFADIAZINE 10 G/1000G
CREAM TOPICAL DAILY
Qty: 50 G | Refills: 0 | Status: SHIPPED | OUTPATIENT
Start: 2021-09-15 | End: 2022-01-24

## 2021-09-16 ENCOUNTER — HOSPITAL ENCOUNTER (OUTPATIENT)
Dept: RADIATION ONCOLOGY | Age: 41
Discharge: HOME | End: 2021-09-16
Payer: COMMERCIAL

## 2021-09-16 PROCEDURE — 77280 THER RAD SIMULAJ FIELD SMPL: CPT

## 2021-09-16 PROCEDURE — 77412 RADIATION TX DELIVERY LVL 3: CPT

## 2021-09-16 PROCEDURE — 77336 RADIATION PHYSICS CONSULT: CPT

## 2021-09-17 ENCOUNTER — HOSPITAL ENCOUNTER (OUTPATIENT)
Dept: RADIATION ONCOLOGY | Age: 41
Discharge: HOME | End: 2021-09-17
Payer: COMMERCIAL

## 2021-09-17 VITALS
HEART RATE: 82 BPM | TEMPERATURE: 98 F | WEIGHT: 162.3 LBS | DIASTOLIC BLOOD PRESSURE: 67 MMHG | OXYGEN SATURATION: 100 % | RESPIRATION RATE: 16 BRPM | BODY MASS INDEX: 27.86 KG/M2 | SYSTOLIC BLOOD PRESSURE: 111 MMHG

## 2021-09-17 PROCEDURE — 77412 RADIATION TX DELIVERY LVL 3: CPT

## 2021-09-20 ENCOUNTER — HOSPITAL ENCOUNTER (OUTPATIENT)
Dept: RADIATION ONCOLOGY | Age: 41
Discharge: HOME | End: 2021-09-20
Payer: COMMERCIAL

## 2021-09-20 PROCEDURE — 77412 RADIATION TX DELIVERY LVL 3: CPT

## 2021-09-20 NOTE — PROGRESS NOTES
Patient: Chu Valderrama MRN: [id number]  SSN: [id number]    YOB: 1980  Age: 46 y.o. Sex: female      DIAGNOSIS:  Recurrent breast cancer    TREATMENT SITE:  L chest wall    DOSE and FRACTIONATION:  26 of 30 fractions; 5200 of 6000cGy    INTERVAL HISTORY:  Chu Valderrama is a 46 y.o. female being treated for recurrent L breast cancer. Week 1: No complaints. Week 2: L shoulder stiffness and pain with treatment positioning that lasts throughout the afternoon. Week 3: Shoulder stiffness managed with Tylenol, new onset esophagitis.   Week 4: no complaints  Week 5: no complaints  Week 6: notes some skin breakdown    OBJECTIVE:  NAD, gr 3 dermatitis in axilla  Visit Vitals  /67 (BP 1 Location: Left upper arm, BP Patient Position: Sitting)   Pulse 82   Temp 98 °F (36.7 °C)   Resp 16   Wt 73.6 kg (162 lb 4.8 oz)   SpO2 100%   BMI 27.86 kg/m²       Lab Results   Component Value Date/Time    Sodium 139 07/28/2021 07:50 AM    Potassium 3.8 07/28/2021 07:50 AM    Chloride 106 07/28/2021 07:50 AM    CO2 25 07/28/2021 07:50 AM    Anion gap 8 07/28/2021 07:50 AM    Glucose 93 07/28/2021 07:50 AM    BUN 9 07/28/2021 07:50 AM    Creatinine 0.60 07/28/2021 07:50 AM    GFR est AA >60 07/28/2021 07:50 AM    GFR est non-AA >60 07/28/2021 07:50 AM    Calcium 8.5 07/28/2021 07:50 AM    Magnesium 1.8 07/28/2021 07:50 AM    Albumin 3.5 07/28/2021 07:50 AM    Protein, total 6.3 07/28/2021 07:50 AM    Globulin 2.8 07/28/2021 07:50 AM    A-G Ratio 1.3 07/28/2021 07:50 AM    ALT (SGPT) 54 07/28/2021 07:50 AM     Lab Results   Component Value Date/Time    WBC 4.6 07/28/2021 07:50 AM    HGB 7.9 (L) 07/28/2021 07:50 AM    HCT 24.2 (L) 07/28/2021 07:50 AM    PLATELET 504 (L) 28/15/2696 07:50 AM    Hgb, External 12.8 12/01/2011 12:00 AM    Hct, External 37.6 12/01/2011 12:00 AM    Platelet cnt., External 210 12/01/2011 12:00 AM       ASSESSMENT and PLAN:  Chu Valderrama is tolerating radiation as anticipated for the current dose and fraction. We will continue on as planned with f/u per Dr. Logan Garcia. Mauroe prescribed.      Carlos Rizzo MD   September 20, 2021

## 2021-09-21 ENCOUNTER — HOSPITAL ENCOUNTER (OUTPATIENT)
Dept: RADIATION ONCOLOGY | Age: 41
Discharge: HOME | End: 2021-09-21
Payer: COMMERCIAL

## 2021-09-21 PROCEDURE — 77412 RADIATION TX DELIVERY LVL 3: CPT

## 2021-09-22 ENCOUNTER — HOSPITAL ENCOUNTER (OUTPATIENT)
Dept: RADIATION ONCOLOGY | Age: 41
Discharge: HOME | End: 2021-09-22
Payer: COMMERCIAL

## 2021-09-22 PROCEDURE — 77412 RADIATION TX DELIVERY LVL 3: CPT

## 2021-09-23 ENCOUNTER — HOSPITAL ENCOUNTER (OUTPATIENT)
Dept: RADIATION ONCOLOGY | Age: 41
Discharge: HOME | End: 2021-09-23
Payer: COMMERCIAL

## 2021-09-23 PROCEDURE — 77412 RADIATION TX DELIVERY LVL 3: CPT

## 2021-09-23 PROCEDURE — 77336 RADIATION PHYSICS CONSULT: CPT

## 2021-09-29 ENCOUNTER — PATIENT OUTREACH (OUTPATIENT)
Dept: CASE MANAGEMENT | Age: 41
End: 2021-09-29

## 2021-09-29 NOTE — PROGRESS NOTES
9/29/2021 Saw the patient with Dr Dylan Sheikh. She completed radiation last week. She has some skin peeling but is doing well. She has some lingering numbness in her toes. She will return in January and we will determine imaging time line. Navigation will sign off.

## 2021-10-07 ENCOUNTER — APPOINTMENT (OUTPATIENT)
Dept: RADIATION ONCOLOGY | Age: 41
End: 2021-10-07

## 2021-12-08 ENCOUNTER — TELEPHONE (OUTPATIENT)
Dept: CASE MANAGEMENT | Age: 41
End: 2021-12-08

## 2021-12-08 NOTE — TELEPHONE ENCOUNTER
Pt called [facility] asking for a referral to [facility] for physical therapy to her left arm and shoulder--pt stated that her arm is stiff and she can't raise it very high. Dr. Grace Anna was notified.   A referral was made to [facility] for P.T.

## 2021-12-27 ENCOUNTER — HOSPITAL ENCOUNTER (OUTPATIENT)
Dept: PHYSICAL THERAPY | Age: 41
Discharge: HOME | End: 2021-12-27
Payer: COMMERCIAL

## 2021-12-27 PROCEDURE — 97161 PT EVAL LOW COMPLEX 20 MIN: CPT

## 2021-12-27 PROCEDURE — 97140 MANUAL THERAPY 1/> REGIONS: CPT

## 2021-12-27 PROCEDURE — 97110 THERAPEUTIC EXERCISES: CPT

## 2021-12-27 NOTE — PROGRESS NOTES
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 Frequency/Duration: 1 time a week for 90 Day(s)       Pre-treatment Symptoms/Complaints:  The patient reports she has had problems lifting her arm since radiation  Pain: Initial: Pain Intensity 1: 2  10 Post Session:  2/10   Medications Last Reviewed:  2021  Updated Objective Findings:  See evaluation note from today  TREATMENT:     THERAPEUTIC EXERCISE: (10 minutes):  Exercises per grid below to improve mobility. Required minimal verbal cues to promote proper body alignment. Progressed range as indicated. MANUAL THERAPY: (15 minutes): Soft tissue mobilization was utilized and necessary because of the patient's restricted motion of soft tissue. soft tissue mobilization of the breast and axilla, educated in below exercises. [employer] PortalAccess Code: [email or web address]  URL: [email or web address]  Date: 2021  Prepared by: Judi Beal    Exercises  Supine Lower Trunk Rotation - 3 x daily - 7 x weekly - 1 sets - 10 reps - 5 hold  Sidelying Thoracic Rotation with Open Book - 3 x daily - 7 x weekly - 1 sets - 10 reps - 5 hold  Supine Shoulder External Rotation with Dowel - 3 x daily - 7 x weekly - 1 sets - 10 reps - 5 hold  Supine Shoulder Flexion with Dowel - 3 x daily - 7 x weekly - 1 sets - 10 reps  Corner Pec Major Stretch - 3 x daily - 7 x weekly - 1 sets - 10 reps - 5 hold      Treatment/Session Summary:    · Response to Treatment:  Tolerated the treatment well.  will need to be diligent. will need education on lymphedema. · Communication/Consultation:  continue to wear a sports bra, HEP, allow spouse to massage and mobilize the breast tissue  · Equipment provided today:  None today  · Recommendations/Intent for next treatment session: Next visit will focus on educate in lymphedema, advance HEP, assess ROM.     Total Treatment Billable Duration:  25 minutes treatment, 32 min evaluation  PT Patient Time In/Time Out  Time In: 1101  Time Out: 9276 Sunday HERIBERTO Sandhu    Future Appointments   Date Time Provider Marlen Peterson   1/3/2022 11:00 AM Srinivasan HARVEY, PT [facility]   1/12/2022  2:10 PM [address] [place] [address] [facility]   1/12/2022  2:45 PM Ad Parker MD [facility]   3/31/2022  9:00 AM Jeanine Bernstein, [address]

## 2021-12-27 NOTE — THERAPY EVALUATION
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 Frequency/Duration: 1 time a week for 90 Day(s) MEDICAL/REFERRING DIAGNOSIS:  RECURRENT TRIPLE NEGATIVE LEFT BREAST CANCER    DATE OF ONSET: 3/21  REFERRING PHYSICIAN: Anurag Nicole MD MD Orders: oncology rehab  Return MD Appointment: 3/31/22     INITIAL ASSESSMENT:  Ms. Nate Mayorga presents following diagnosis and treatment of recurrent breast cancer. She was initially diagnosed in . She underwent bilateral mastectomy 16. She received expanders and then implants. She received ddAC and T which was completed in 3/17. She was unable to complete tamoxifen due to toxicity. In 3/21 she developed a new lump. This was resected along with 9 nodes that were negative. She underwent taxotere and carboplatin x 4. She completed radiation . She has developed limited ROM of the left shoulder. She is at risk for lymphedema. She will benefit for lymphedema education and possibly a compression garment. She will also benefit from therapeutic exercises and manual therapy. PROBLEM LIST (Impacting functional limitations):  1. Decreased Strength  2. Increased Pain  3. Decreased Flexibility/Joint Mobility  4. Edema/Girth  5. Decreased Knowledge of Precautions  6. Decreased [place] with Home Exercise Program INTERVENTIONS PLANNED: (Treatment may consist of any combination of the following)  1.  Decongestion Therapy  2. Home Exercise Program (HEP)  3. Manual Therapy  4. Range of Motion (ROM)  5. Therapeutic Exercise/Strengthening     GOALS: (Goals have been discussed and agreed upon with patient.)  Short-Term Functional Goals: Time Frame: 4 weeks  1. The patient will be independent with HEP for ROM within 4 weeks. 2. The patient will be independent with self breast mobilization within 4 weeks. 3. The patient will have knowledge of signs and symptoms of lymphedema and how to mange within 4 weeks. Discharge Goals: Time Frame: 8 weeks  1. The patient will be fit with a compression garment within 8 weeks. 2. The patient will improve her DASH by 5 within 8 weeks. 3. The patient will gain 1/2 grade strength within 8 weeks. 4. The patient will improve her shoulder ROM by 20 degrees within 8 weeks. OUTCOME MEASURE:   Tool Used: Disabilities of the Arm, Shoulder and Hand (DASH) Questionnaire - Quick Version  Score:  Initial: 21/55  Most Recent: X/55 (Date: -- )   Interpretation of Score: The DASH is designed to measure the activities of daily living in person's with upper extremity dysfunction or pain. Each section is scored on a 1-5 scale, 5 representing the greatest disability. The scores of each section are added together for a total score of 55. Tool Used: ECOG Performance Survey Score  Score:  Initial: 0 Most Recent:      Interpretation of Score:   0 Fully active, able to carry on all pre-disease performance without restriction   1 Restricted in physically strenuous activity but ambulatory and able to carry out work of a light or sedentary nature, e.g., light house work, office work   2 Ambulatory and capable of all selfcare but unable to carry out any work activities. Up and about more than 50% of waking hours   3 Capable of only limited selfcare, confined to bed or chair more than 50% of waking hours   4 Completely disabled. Cannot carry on any selfcare.  Totally confined to bed or chair   5 Dead MEDICAL NECESSITY:   · Patient is expected to demonstrate progress in strength, range of motion and edema management to increase independence with household activity. REASON FOR SERVICES/OTHER COMMENTS:  · Patient continues to demonstrate capacity to improve strength, ROM and edema management which will increase independence. Total Duration:  PT Patient Time In/Time Out  Time In: 1101  Time Out: 1157    Rehabilitation Potential For Stated Goals: Good  Regarding Colon Leventhal Conway's therapy, I certify that the treatment plan above will be carried out by a therapist or under their direction. Thank you for this referral,  Desiree Culver, PT          PAIN/SUBJECTIVE:   Initial: Pain Intensity 1: 2  Left shoulder and breast Post Session:  2/10   HISTORY:   History of Injury/Illness (Reason for Referral):  Post mastectomy with reconstruction. Risk for lymphedema, decreased ROM, pain  Past Medical History/Comorbidities:   Ms. Mariaelena Magallon  has a past medical history of Breast cancer ([facility] [place] [address] Cancer ([facility] [place] [address] (dx--16), and MRSA infection (). Ms. Mariaelena Magallon  has a past surgical history that includes hx cervical polypectomy; hx wisdom teeth extraction; hx  section (); hx other surgical (2019); hx vascular access (); hx mastectomy (Bilateral, 2016); hx breast reconstruction (Bilateral, 2016); hx breast reconstruction (Bilateral, 2017); hx breast reconstruction (Bilateral, 2020); hx breast biopsy (Left, 2021); hx knee arthroscopy (Right, ); hx orthopaedic (Right, ); hx orthopaedic (Right, 08/15/2019); hx breast lumpectomy (Left, 3/31/2021); hx breast reconstruction (Left, 3/31/2021); ir insert tunl cvc w port over 5 years (4/15/2021); and ir remove tunl cvad w port/pump (2021).    Past Medical History:   Diagnosis Date    Breast cancer ([facility] [place] [address]     Cancer ([facility] [address] dx--16    left breast cancer--- surgery and chemo     MRSA infection     LEFT EAR     Past Surgical History:   Procedure Laterality Date    HX BREAST BIOPSY Left 2021    HX BREAST LUMPECTOMY Left 3/31/2021    LEFT BREAST LUMPECTOMY performed by Nery Whiteside MD at [facility] MAIN OR    HX BREAST RECONSTRUCTION Bilateral 2016    BREAST TISSUE EXPANDER INSERTION AND NIPPLE SPARING/ BILATERAL  performed by Mathieu Hsieh MD at [address] [place] [address] HX BREAST RECONSTRUCTION Bilateral 2017    BILATERAL BREAST TISSUE EXPANDER REMOVAL /PLACEMENT OF PERMANENT IMPLANTS performed by Mathieu Hsieh MD at [address] [place] [address] HX BREAST RECONSTRUCTION Bilateral 2020    BILATERAL REVISION OF BREAST RECONSTRUCTION/ performed by Jose A Amado MD at [address] [place] [address] HX BREAST RECONSTRUCTION Left 3/31/2021    LEFT BREAST COMPLEX CLOSURE performed by Jose A Amado MD at [facility] MAIN OR    HX CERVICAL POLYPECTOMY      UTERINE    HX  SECTION  2012    x 1     HX KNEE ARTHROSCOPY Right     HX MASTECTOMY Bilateral 2016    BREAST MASTECTOMY SIMPLE BILATERAL performed by Nyla Oneal MD at [address] [place] [address] HX ORTHOPAEDIC Right     cyst removed from foot    HX ORTHOPAEDIC Right 08/15/2019    RIGHT KIDNER PROCEDURE CHOICE ANES (Right Foot)    HX OTHER SURGICAL  2019    RIGHT FOOT EXCISION SOFT TISSUE MASS (Right Foot)     HX VASCULAR ACCESS  2016    life port, and removed    HX WISDOM TEETH EXTRACTION      IR INSERT TUNL CVC W PORT OVER 5 YEARS  4/15/2021    IR REMOVE TUNL CVAD W PORT/PUMP  2021       Social History/Living Environment:     lives with spouse and child  Prior Level of Function/Work/Activity:  independent  Dominant Side:         RIGHT   Ambulatory/Rehab Services H2 Model Falls Risk Assessment   Risk Factors:       No Risk Factors Identified Ability to Rise from Chair:       (0)  Ability to rise in a single movement   Falls Prevention Plan:       No modifications necessary   Total: (5 or greater = High Risk): 0    [facility]. All Rights Reserved. [facility] Patent #[id number]. Federal Law prohibits the replication, distribution or use without written permission from [facility], [place] [facility]   Current Medications:       Current Outpatient Medications:     silver sulfADIAZINE (SILVADENE) 1 % topical cream, Apply  to affected area daily. , Disp: 50 g, Rfl: 0    magic mouthwash solution, Magic mouth wash Maalox Lidocaine 2% viscous Diphenhydramine oral solution Pharmacy to mix equal portions of ingredients to a total volume as indicated in the dispense amount. 5mL TID gargle and swallow as needed for pain, Disp: 420 mL, Rfl: 0    venlafaxine-SR (Effexor XR) 75 mg capsule, Take 1 Capsule by mouth daily. , Disp: 30 Capsule, Rfl: 3    levoFLOXacin (Levaquin) 500 mg tablet, Take 1 Tablet by mouth daily. (Patient not taking: Reported on 7/28/2021), Disp: 7 Tablet, Rfl: 0    magnesium oxide (MAG-OX) 400 mg tablet, Take 1 Tablet by mouth three (3) times daily. (Patient not taking: Reported on 7/28/2021), Disp: 21 Tablet, Rfl: 0    potassium chloride (K-DUR, KLOR-CON) 20 mEq tablet, Take 2 Tablets by mouth two (2) times a day. (Patient not taking: Reported on 7/28/2021), Disp: 20 Tablet, Rfl: 0    dicyclomine (BENTYL) 10 mg/5 mL soln oral solution, Take 10 mL by mouth four (4) times daily. (Patient not taking: Reported on 9/29/2021), Disp: 280 mL, Rfl: 0    diclofenac (VOLTAREN XR) 100 mg ER tablet, Take 1 Tablet by mouth daily. (Patient not taking: Reported on 7/28/2021), Disp: 30 Tablet, Rfl: 1    dexAMETHasone (Decadron) 4 mg tablet, Take 2 tablets twice a day - day before, day of, and day after chemo treatment (Patient not taking: Reported on 7/28/2021), Disp: 72 Tab, Rfl: 0    LORazepam (ATIVAN) 1 mg tablet, Take 1 Tab by mouth every four (4) hours as needed for Anxiety. Max Daily Amount: 6 mg.  As needed for nausea, vomiting or anxiety, Disp: 60 Tab, Rfl: 1    lidocaine-prilocaine (EMLA) topical cream, Apply  to affected area as needed for Pain. Place over port site 30-45 minutes prior to chemotherapy (Patient not taking: Reported on 7/28/2021), Disp: 30 g, Rfl: 1    clindamycin (CLINDAGEL) 1 % topical gel, 1 APPLICATION ONCE A DAY TO FACE EXTERNALLY 30 DAYS (Patient not taking: Reported on 7/28/2021), Disp: , Rfl:     LEVONORGESTREL (MIRENA IU), by IntraUTERine route. Indications: placed 2013, Disp: , Rfl:    Date Last Reviewed:  12/27/21   Number of Personal Factors/Comorbidities that affect the Plan of Care: 1-2: MODERATE COMPLEXITY   EXAMINATION:   Palpation:          Tissue tight and tender along lateral breast, pectoralis and axilla  ROM:          Right shoulder within normal limits  Left flexion 110, abduction 80, external rotation 25  Strength:          4/5  Functional Mobility:         Gait/Ambulation:  independent        Transfers:  independent        Bed Mobility:  independent  Skin Integrity:          Intact, post radiation discoloration  Edema/Girth:  pitting    Left Right    Initial Most Recent Initial Most Recent   Upper  Extremity Base 3rd Finger (cm): 17  Wrist (cm): 15.6  Mid Forearm (cm): 19.9  Elbow (cm): 25.7  Axilla (cm): 29.4 (33)   Base 3rd Finger (cm): 17.5  Wrist (cm): 15.6  Mid Forearm (cm): 19.9  Elbow (cm): 25.7  Axilla (cm): 30 (32.2)     Lower  Extremity               Body Structures Involved:  1. Joints  2. Muscles  3. lymphatic system Body Functions Affected:  1. Sensory/Pain  2. Neuromusculoskeletal Activities and Participation Affected:  1.  None   Number of elements (examined above) that affect the Plan of Care: 4+: HIGH COMPLEXITY   CLINICAL PRESENTATION:   Presentation: Stable and uncomplicated: LOW COMPLEXITY   CLINICAL DECISION MAKING:   Use of outcome tool(s) and clinical judgement create a POC that gives a: Clear prediction of patient's progress: LOW COMPLEXITY

## 2022-01-03 ENCOUNTER — HOSPITAL ENCOUNTER (OUTPATIENT)
Dept: PHYSICAL THERAPY | Age: 42
Discharge: HOME | End: 2022-01-03
Payer: COMMERCIAL

## 2022-01-03 PROCEDURE — 97110 THERAPEUTIC EXERCISES: CPT

## 2022-01-03 PROCEDURE — 97140 MANUAL THERAPY 1/> REGIONS: CPT

## 2022-01-03 NOTE — PROGRESS NOTES
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 Frequency/Duration: 1 time a week for 90 Day(s)       Pre-treatment Symptoms/Complaints:  The patient reports she is using her arm better. She reports she is having back pain for some reason this morning. Pain: Initial: Pain Intensity 1: 2  /10 Post Session:  2/10   Medications Last Reviewed:  1/3/2022  Updated Objective Findings:    Left flexion 160, abduction 140, external rotation 62  TREATMENT:     THERAPEUTIC EXERCISE: (15 minutes):  Exercises per grid below to improve mobility. Required minimal verbal cues to promote proper body alignment. Progressed range as indicated. MANUAL THERAPY: ( 11 minutes): Soft tissue mobilization was utilized and necessary because of the patient's restricted motion of soft tissue. reviewed below exercises. educated in lymphedema. Requested a prescription for a compression sleeve and gauntlet for the patient. We will print it out next visit and give options to have it filled. Added chicken wing and yovana of hope to HEP. Will perform manual therapy next visit. [employer] PortalAccess Code: [email or web address]  URL: [email or web address]  Date: 2021  Prepared by: Surya Bob    Exercises  Supine Lower Trunk Rotation - 3 x daily - 7 x weekly - 1 sets - 10 reps - 5 hold  Sidelying Thoracic Rotation with Open Book - 3 x daily - 7 x weekly - 1 sets - 10 reps - 5 hold  Supine Shoulder External Rotation with Dowel - 3 x daily - 7 x weekly - 1 sets - 10 reps - 5 hold  Supine Shoulder Flexion with Dowel - 3 x daily - 7 x weekly - 1 sets - 10 reps  Corner Pec Major Stretch - 3 x daily - 7 x weekly - 1 sets - 10 reps - 5 hold  Access Code: [email or web address]  URL: [email or web address]  Date: 01/03/2022  Prepared by: Judy Thomason    Exercises  Chicken Wing - 2 x daily - 7 x weekly - 1 sets - 10 reps - 5 hold  Supine PNF D2 - 2 x daily - 7 x weekly - 1 sets - 10 reps - 5 hold      Treatment/Session Summary:    · Response to Treatment:  Tolerated the treatment well. ROM significantly improved. Function improved per the patient. · Communication/Consultation:  continue to wear a sports bra, HEP, allow spouse to massage and mobilize the breast tissue  · Equipment provided today:  None today  · Recommendations/Intent for next treatment session: Next visit will focus on compression garment, advance HEP, assess ROM.     Total Treatment Billable Duration:  26 minutes treatment  PT Patient Time In/Time Out  Time In: 9390  Time Out: [facility]     Future Appointments   Date Time Provider Marlen Peterson   1/10/2022 11:00 AM Anthony Chopra PT [facility]   1/12/2022  2:10 PM [facility] OUTREACH INSURANCE [facility]   1/12/2022  2:45 PM Sina Jamison MD [facility]   3/31/2022  9:00 AM Donald Bernstein, [address]ERS [address] [facility]

## 2022-01-10 ENCOUNTER — HOSPITAL ENCOUNTER (OUTPATIENT)
Dept: PHYSICAL THERAPY | Age: 42
Discharge: HOME | End: 2022-01-10
Payer: COMMERCIAL

## 2022-01-12 ENCOUNTER — HOSPITAL ENCOUNTER (OUTPATIENT)
Dept: LAB | Age: 42
Discharge: HOME | End: 2022-01-12

## 2022-01-12 DIAGNOSIS — Z17.1 MALIGNANT NEOPLASM OF LOWER-INNER QUADRANT OF LEFT BREAST IN FEMALE, ESTROGEN RECEPTOR NEGATIVE (HCC): ICD-10-CM

## 2022-01-12 DIAGNOSIS — C50.312 MALIGNANT NEOPLASM OF LOWER-INNER QUADRANT OF LEFT BREAST IN FEMALE, ESTROGEN RECEPTOR NEGATIVE (HCC): ICD-10-CM

## 2022-01-17 ENCOUNTER — HOSPITAL ENCOUNTER (OUTPATIENT)
Dept: PHYSICAL THERAPY | Age: 42
End: 2022-01-17
Payer: COMMERCIAL

## 2022-01-19 ENCOUNTER — HOSPITAL ENCOUNTER (OUTPATIENT)
Dept: PHYSICAL THERAPY | Age: 42
Discharge: HOME | End: 2022-01-19
Payer: COMMERCIAL

## 2022-01-19 PROCEDURE — 97110 THERAPEUTIC EXERCISES: CPT

## 2022-01-19 PROCEDURE — 97140 MANUAL THERAPY 1/> REGIONS: CPT

## 2022-01-19 NOTE — PROGRESS NOTES
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 Frequency/Duration: 1 time a week for 90 Day(s)       Pre-treatment Symptoms/Complaints:  The patient reports she is using her arm better. She reports her breast feels heavy and higher since radiation. Pain: Initial: Pain Intensity 1: 0  /10 Post Session:  0/10   Medications Last Reviewed:  2022  Updated Objective Findings:    Left flexion 165, abduction 145, external rotation 75  TREATMENT:     THERAPEUTIC EXERCISE: (15 minutes):  Exercises per grid below to improve mobility. Required minimal verbal cues to promote proper body alignment. Progressed range as indicated. MANUAL THERAPY: ( 11 minutes): Soft tissue mobilization was utilized and necessary because of the patient's restricted motion of soft tissue. reviewed below exercises. Given a prescription for a compression sleeve and gauntlet for the patient that was requested from the physician. Given options of locations for fitting. Scar massage and tissue mobilization of the left breast and axilla performed. Assessed ROM. Kutoto PortalAccess Code: [email or web address]  URL: [email or web address]  Date: 2021  Prepared by: Jaswant Nicole    Exercises  Supine Lower Trunk Rotation - 3 x daily - 7 x weekly - 1 sets - 10 reps - 5 hold  Sidelying Thoracic Rotation with Open Book - 3 x daily - 7 x weekly - 1 sets - 10 reps - 5 hold  Supine Shoulder External Rotation with Dowel - 3 x daily - 7 x weekly - 1 sets - 10 reps - 5 hold  Supine Shoulder Flexion with Dowel - 3 x daily - 7 x weekly - 1 sets - 10 reps  Corner Pec Major Stretch - 3 x daily - 7 x weekly - 1 sets - 10 reps - 5 hold  Access Code: [email or web address]  URL: [email or web address]  Date: 01/03/2022  Prepared by: Mary Sanders  Chicken Wing - 2 x daily - 7 x weekly - 1 sets - 10 reps - 5 hold  Supine PNF D2 - 2 x daily - 7 x weekly - 1 sets - 10 reps - 5 hold      Treatment/Session Summary:    · Response to Treatment:  Tolerated the treatment well. ROM continues to improve. Tissue more mobile after treatment. · Communication/Consultation:  Acquire a compression garment. · Equipment provided today:  None today  · Recommendations/Intent for next treatment session: Next visit will focus on compression garment, advance HEP, assess ROM.     Total Treatment Billable Duration:  26 minutes treatment  PT Patient Time In/Time Out  Time In: 8033  Time Out: 8972  Bisi De Leon PT    Future Appointments   Date Time Provider Marlen Peterson   1/24/2022 10:00 AM Jorge Rodriguez MD [facility]   1/26/2022  2:45 PM [facility]   3/31/2022  9:00 AM Prince Bernstein, NP [address] [facility]

## 2022-01-26 ENCOUNTER — HOSPITAL ENCOUNTER (OUTPATIENT)
Dept: PHYSICAL THERAPY | Age: 42
Discharge: HOME | End: 2022-01-26
Payer: COMMERCIAL

## 2022-01-26 PROCEDURE — 97110 THERAPEUTIC EXERCISES: CPT

## 2022-01-26 PROCEDURE — 97140 MANUAL THERAPY 1/> REGIONS: CPT

## 2022-01-26 NOTE — PROGRESS NOTES
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 Frequency/Duration: 1 time a week for 90 Day(s)       Pre-treatment Symptoms/Complaints:  The patient reports she is having low back pain and is going to have an MRI 22. She reports she saw her plastic surgeon and will see her again in 4-6 months to address the contracture. Pain: Initial:    /10 Post Session:  0/10   Medications Last Reviewed:  2022  Updated Objective Findings:    Left flexion 165, abduction 145, external rotation 75  TREATMENT:     THERAPEUTIC EXERCISE: (25 minutes):  Exercises per grid below to improve mobility. Required minimal verbal cues to promote proper body alignment. Progressed range as indicated. MANUAL THERAPY: ( 18 minutes): Soft tissue mobilization was utilized and necessary because of the patient's restricted motion of soft tissue. reviewed below exercises. Deleted thoracic open book and trunk rotation due to lumbar pain. Scar massage and tissue mobilization of the left breast and axilla performed. Tissue more flexible post treatment. She will receive her garment this week. She had to have pre-authorization prior to getting it. DialMyApp PortalAccess Code: [email or web address]  URL: [email or web address]  Date: 2021  Prepared by: Niranjan Whittington    Exercises  Supine Lower Trunk Rotation - 3 x daily - 7 x weekly - 1 sets - 10 reps - 5 hold  Sidelying Thoracic Rotation with Open Book - 3 x daily - 7 x weekly - 1 sets - 10 reps - 5 hold  Supine Shoulder External Rotation with Dowel - 3 x daily - 7 x weekly - 1 sets - 10 reps - 5 hold  Supine Shoulder Flexion with Dowel - 3 x daily - 7 x weekly - 1 sets - 10 reps  Corner Pec Major Stretch - 3 x daily - 7 x weekly - 1 sets - 10 reps - 5 hold  Access Code: [email or web address]  URL: [email or web address]  Date: 01/03/2022  Prepared by: Christy Bullard    Exercises  Chicken Wing - 2 x daily - 7 x weekly - 1 sets - 10 reps - 5 hold  Supine PNF D2 - 2 x daily - 7 x weekly - 1 sets - 10 reps - 5 hold      Treatment/Session Summary:    · Response to Treatment:  Tolerated the treatment well. Tissue more mobile after treatment. · Communication/Consultation:  Acquire a compression garment. · Equipment provided today:  None today  · Recommendations/Intent for next treatment session: Next visit will focus on compression garment, advance HEP, assess ROM.     Total Treatment Billable Duration:  38 minutes treatment  PT Patient Time In/Time Out  Time In: 1222  Time Out: 1595  Lev Lion PT    Future Appointments   Date Time Provider Marlen Danielsi   2/2/2022  9:00 AM Maximo Culver, PT [facility]   2/7/2022 10:15 AM SFE MRI UNIT 1 [facility]   2/7/2022 11:15 AM SFE GARRY BI US PROCEDURE ROOM ERMAM [facility]   2/9/2022  8:30 AM Bria Martínez MD [facility]   3/31/2022  9:00 AM Silverio Bernstein NP [facility]

## 2022-02-02 ENCOUNTER — HOSPITAL ENCOUNTER (OUTPATIENT)
Dept: PHYSICAL THERAPY | Age: 42
Discharge: HOME | End: 2022-02-02
Payer: COMMERCIAL

## 2022-02-02 PROCEDURE — 97110 THERAPEUTIC EXERCISES: CPT

## 2022-02-02 PROCEDURE — 97140 MANUAL THERAPY 1/> REGIONS: CPT

## 2022-02-02 NOTE — PROGRESS NOTES
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 Frequency/Duration: 1 time a week for 90 Day(s)       Pre-treatment Symptoms/Complaints:  The patient reports she isn't having pain. She reports she has her new sleeve and has been trying to wear it. Pain: Initial: Pain Intensity 1: 0  /10 Post Session:  0/10   Medications Last Reviewed:  2022  Updated Objective Findings:    Left flexion 165, abduction 145, external rotation 75  TREATMENT:     THERAPEUTIC EXERCISE: (20 minutes):  Exercises per grid below to improve mobility. Required minimal verbal cues to promote proper body alignment. Progressed range as indicated. MANUAL THERAPY: ( 12 minutes): Soft tissue mobilization was utilized and necessary because of the patient's restricted motion of soft tissue. reviewed below exercises. Added 5 reps of lateral wall stretch with 5 count hold. Scar massage and tissue mobilization of the left breast and axilla performed. Tissue  Less tender post treatment. She  Have her MRI Monday. We will see her Thursday and decide whether we may resume trunk rotation. We will assess ROM and girth at that time. Instructed in correct donning and doffing of garments. We will add strengthening as able. Soapets PortalAccess Code: [email or web address]  URL: [email or web address]  Date: 2021  Prepared by: Marely Pittman    Exercises  Supine Lower Trunk Rotation - 3 x daily - 7 x weekly - 1 sets - 10 reps - 5 hold  Sidelying Thoracic Rotation with Open Book - 3 x daily - 7 x weekly - 1 sets - 10 reps - 5 hold  Supine Shoulder External Rotation with Dowel - 3 x daily - 7 x weekly - 1 sets - 10 reps - 5 hold  Supine Shoulder Flexion with Dowel - 3 x daily - 7 x weekly - 1 sets - 10 reps  Corner Pec Major Stretch - 3 x daily - 7 x weekly - 1 sets - 10 reps - 5 hold  Access Code: [email or web address]  URL: [email or web address]  Date: 01/03/2022  Prepared by: Rosalee Love    Exercises  Chicken Wing - 2 x daily - 7 x weekly - 1 sets - 10 reps - 5 hold  Supine PNF D2 - 2 x daily - 7 x weekly - 1 sets - 10 reps - 5 hold      Treatment/Session Summary:    · Response to Treatment:  Tolerated the treatment well. Tissue more mobile after treatment. Garment is a good fit. · Communication/Consultation:  Acquire a compression garment. · Equipment provided today:  None today  · Recommendations/Intent for next treatment session: Next visit will focus on compression garment, advance HEP, assess ROM.     Total Treatment Billable Duration:  32 minutes treatment  PT Patient Time In/Time Out  Time In: 8287  Time Out: 0930  Karson Patel PT    Future Appointments   Date Time Provider Marlen Peterson   2/7/2022 10:15 AM SFE MRI UNIT 1 [facility]   2/7/2022 11:15 AM SFE GARRY BI US PROCEDURE ROOM [facility]ERMAM [facility]   2/9/2022  8:30 AM Desiree Reynoso MD [place]   2/10/2022 10:00 AM Desiree Culver, PT [facility]   3/31/2022  9:00 AM Brady Bernstein NP [facility]

## 2022-02-07 ENCOUNTER — HOSPITAL ENCOUNTER (OUTPATIENT)
Dept: MRI IMAGING | Age: 42
Discharge: HOME | End: 2022-02-07
Attending: INTERNAL MEDICINE
Payer: COMMERCIAL

## 2022-02-07 ENCOUNTER — HOSPITAL ENCOUNTER (OUTPATIENT)
Dept: MAMMOGRAPHY | Age: 42
Discharge: HOME | End: 2022-02-07
Attending: INTERNAL MEDICINE
Payer: COMMERCIAL

## 2022-02-07 DIAGNOSIS — N63.0 BREAST NODULE: ICD-10-CM

## 2022-02-07 DIAGNOSIS — M54.50 ACUTE LOW BACK PAIN WITHOUT SCIATICA, UNSPECIFIED BACK PAIN LATERALITY: ICD-10-CM

## 2022-02-07 PROCEDURE — 72158 MRI LUMBAR SPINE W/O & W/DYE: CPT

## 2022-02-07 PROCEDURE — A9576 INJ PROHANCE MULTIPACK: HCPCS | Performed by: INTERNAL MEDICINE

## 2022-02-07 PROCEDURE — 76642 ULTRASOUND BREAST LIMITED: CPT

## 2022-02-07 PROCEDURE — 74011250636 HC RX REV CODE- 250/636: Performed by: INTERNAL MEDICINE

## 2022-02-07 RX ADMIN — GADOTERIDOL 15 ML: 279.3 INJECTION, SOLUTION INTRAVENOUS at 10:57

## 2022-02-10 ENCOUNTER — HOSPITAL ENCOUNTER (OUTPATIENT)
Dept: PHYSICAL THERAPY | Age: 42
Discharge: HOME | End: 2022-02-10
Payer: COMMERCIAL

## 2022-02-10 PROCEDURE — 97110 THERAPEUTIC EXERCISES: CPT

## 2022-02-10 NOTE — PROGRESS NOTES
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 Frequency/Duration: 1 time a week for 90 Day(s)       Pre-treatment Symptoms/Complaints:  The patient reports her shoulder is feeling much better. Pain: Initial: Pain Intensity 1: 0  /10 Post Session:  0/10   Medications Last Reviewed:  2/10/2022  Updated Objective Findings:    Left flexion 165, abduction 158, external rotation 90  TREATMENT:     THERAPEUTIC EXERCISE: (34 minutes):  Exercises per grid below to improve mobility. Required minimal verbal cues to promote proper body alignment. Progressed range as indicated. MANUAL THERAPY: (  minutes): Soft tissue mobilization was utilized and necessary because of the patient's restricted motion of soft tissue. reviewed below exercises including lateral wall stretch. We added back in trunk rotation and thoracic open book. She is performing self scar massage. She was instructed in bicep curls, triceps extension bent over, bent over rows, shoulder abduction and shoulder flexion with 3# x 10 reps. She is to perform these 3 days per week. Girth assessed along with ROM. ROM improved and girth is stable. We will assess girth after 3 weeks of strengthening. hovelstay PortalAccess Code: [email or web address]  URL: [email or web address]  Date: 2021  Prepared by: Clifford Fox    Exercises  Supine Lower Trunk Rotation - 3 x daily - 7 x weekly - 1 sets - 10 reps - 5 hold  Sidelying Thoracic Rotation with Open Book - 3 x daily - 7 x weekly - 1 sets - 10 reps - 5 hold  Supine Shoulder External Rotation with Dowel - 3 x daily - 7 x weekly - 1 sets - 10 reps - 5 hold  Supine Shoulder Flexion with Dowel - 3 x daily - 7 x weekly - 1 sets - 10 reps  Corner Pec Major Stretch - 3 x daily - 7 x weekly - 1 sets - 10 reps - 5 hold  Access Code: [email or web address]  URL: [email or web address]  Date: 01/03/2022  Prepared by: Manzanares Cough    Exercises  Chicken Wing - 2 x daily - 7 x weekly - 1 sets - 10 reps - 5 hold  Supine PNF D2 - 2 x daily - 7 x weekly - 1 sets - 10 reps - 5 hold  Access Code: [email or web address]  URL: [email or web address]  Date: 02/10/2022  Prepared by: Manzanares Cough    Exercises  Standing Bicep Curls Supinated with Dumbbells - 1 x daily - 3 x weekly - 1 sets - 10 reps  Standing Shoulder Flexion to 90 Degrees with Dumbbells - 1 x daily - 3 x weekly - 1 sets - 10 reps  Shoulder Abduction with Dumbbells - Thumbs Up - 1 x daily - 3 x weekly - 1 sets - 10 reps  Bent Over Single Arm Shoulder Row with Dumbbell - 1 x daily - 3 x weekly - 1 sets - 10 reps  Standing Bent Over on Chair Single Arm Tricep Extension with Dumbbell - 1 x daily - 3 x weekly - 1 sets - 10 reps      Treatment/Session Summary:    · Response to Treatment:  Tolerated the treatment well. ROM is improved and girth is stable. She will benefit from strengthening at this time. · Communication/Consultation:  Monitor for edema  · Equipment provided today:  None today  · Recommendations/Intent for next treatment session: Next visit will focus on assessing girth after adding strengthening.     Total Treatment Billable Duration:  34 minutes treatment  PT Patient Time In/Time Out  Time In: 1004  Time Out: [address], PT    Future Appointments   Date Time Provider Marlen Kristen   3/3/2022  1:00 PM Jose Hernandez PT [facility]   3/31/2022  9:00 AM Roux, Roney Hashimoto, ANDERS [address] [facility]   6/8/2022  8:30 AM [facility] OUTREACH INSURANCE GCCOIG [facility]   6/8/2022  9:00 AM Marisabel Romero NP [facility]

## 2022-03-03 ENCOUNTER — HOSPITAL ENCOUNTER (OUTPATIENT)
Dept: PHYSICAL THERAPY | Age: 42
Discharge: HOME | End: 2022-03-03

## 2022-03-18 PROBLEM — E83.42 HYPOMAGNESEMIA: Status: ACTIVE | Noted: 2021-07-07

## 2022-03-18 PROBLEM — C50.912 RECURRENT BREAST CANCER, LEFT (HCC): Status: ACTIVE | Noted: 2021-03-16

## 2022-03-19 PROBLEM — D70.9 NEUTROPENIC FEVER (HCC): Status: ACTIVE | Noted: 2021-06-24

## 2022-03-19 PROBLEM — Z30.433 ENCOUNTER FOR IUD REMOVAL AND REINSERTION: Status: ACTIVE | Noted: 2018-02-16

## 2022-03-19 PROBLEM — R50.81 NEUTROPENIC FEVER (HCC): Status: ACTIVE | Noted: 2021-06-24

## 2022-03-19 PROBLEM — E86.0 DEHYDRATION: Status: ACTIVE | Noted: 2021-06-22

## 2022-03-19 PROBLEM — R52 PAIN, GENERALIZED: Status: ACTIVE | Noted: 2021-06-24

## 2022-04-22 DIAGNOSIS — C50.312 MALIGNANT NEOPLASM OF LOWER-INNER QUADRANT OF LEFT FEMALE BREAST, UNSPECIFIED ESTROGEN RECEPTOR STATUS (HCC): Primary | ICD-10-CM

## 2022-05-02 NOTE — THERAPY DISCHARGE
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 Frequency/Duration: 1 time a week for 90 Day(s) MEDICAL/REFERRING DIAGNOSIS:  RECURRENT TRIPLE NEGATIVE LEFT BREAST CANCER    DATE OF ONSET: 3/21  REFERRING PHYSICIAN: José Manuel Casey MD MD Orders: oncology rehab  Return MD Appointment: 3/31/22     INITIAL ASSESSMENT:  Ms. Linda Rivera presents following diagnosis and treatment of recurrent breast cancer. She was initially diagnosed in . She underwent bilateral mastectomy 16. She received expanders and then implants. She received ddAC and T which was completed in 3/17. She was unable to complete tamoxifen due to toxicity. In 3/21 she developed a new lump. This was resected along with 9 nodes that were negative. She underwent taxotere and carboplatin x 4. She completed radiation . She has developed limited ROM of the left shoulder. She is at risk for lymphedema. She will benefit for lymphedema education and possibly a compression garment. She will also benefit from therapeutic exercises and manual therapy. The patient attended 6 visits. She cancelled her final appointment and did not reschedule. We will discontinue therapy at this time. Goals were partially met. PROBLEM LIST (Impacting functional limitations):  1. Decreased Strength  2. Increased Pain  3. Decreased Flexibility/Joint Mobility  4. Edema/Girth  5.  Decreased Knowledge of Precautions  6. Decreased [place] with Home Exercise Program INTERVENTIONS PLANNED: (Treatment may consist of any combination of the following)  1. Decongestion Therapy  2. Home Exercise Program (HEP)  3. Manual Therapy  4. Range of Motion (ROM)  5. Therapeutic Exercise/Strengthening     GOALS: (Goals have been discussed and agreed upon with patient.)  Short-Term Functional Goals: Time Frame: 4 weeks  1. The patient will be independent with HEP for ROM within 4 weeks. Met   2. The patient will be independent with self breast mobilization within 4 weeks. Met   3. The patient will have knowledge of signs and symptoms of lymphedema and how to mange within 4 weeks. Met   Discharge Goals: Time Frame: 8 weeks  1. The patient will be fit with a compression garment within 8 weeks. Met   2. The patient will improve her DASH by 5 within 8 weeks. 3. The patient will gain 1/2 grade strength within 8 weeks. 4. The patient will improve her shoulder ROM by 20 degrees within 8 weeks. OUTCOME MEASURE:   Tool Used: Disabilities of the Arm, Shoulder and Hand (DASH) Questionnaire - Quick Version  Score:  Initial: 21/55  Most Recent: X/55 (Date: -- )   Interpretation of Score: The DASH is designed to measure the activities of daily living in person's with upper extremity dysfunction or pain. Each section is scored on a 1-5 scale, 5 representing the greatest disability. The scores of each section are added together for a total score of 55. Tool Used: ECOG Performance Survey Score  Score:  Initial: 0 Most Recent:      Interpretation of Score:   0 Fully active, able to carry on all pre-disease performance without restriction   1 Restricted in physically strenuous activity but ambulatory and able to carry out work of a light or sedentary nature, e.g., light house work, office work   2 Ambulatory and capable of all selfcare but unable to carry out any work activities.  Up and about more than 50% of waking hours   3 Capable of only limited selfcare, confined to bed or chair more than 50% of waking hours   4 Completely disabled. Cannot carry on any selfcare. Totally confined to bed or chair   5 Dead        MEDICAL Total Duration:  PT Patient Time In/Time Out  Time In: 1004  Time Out: 1038    Rehabilitation Potential For Stated Goals: Good  Regarding Marybuster Oreilly Russell's therapy, I certify that the treatment plan above will be carried out by a therapist or under their direction.   Thank you for this referral,  Rosa Maria Edouard, PT

## 2022-06-08 ENCOUNTER — HOSPITAL ENCOUNTER (OUTPATIENT)
Dept: LAB | Age: 42
Discharge: HOME | End: 2022-06-11
Payer: COMMERCIAL

## 2022-06-08 ENCOUNTER — OFFICE VISIT (OUTPATIENT)
Dept: ONCOLOGY | Age: 42
End: 2022-06-08
Payer: COMMERCIAL

## 2022-06-08 VITALS
WEIGHT: 178.7 LBS | HEART RATE: 82 BPM | DIASTOLIC BLOOD PRESSURE: 76 MMHG | HEIGHT: 64 IN | RESPIRATION RATE: 20 BRPM | TEMPERATURE: 98.1 F | BODY MASS INDEX: 30.51 KG/M2 | OXYGEN SATURATION: 91 % | SYSTOLIC BLOOD PRESSURE: 109 MMHG

## 2022-06-08 DIAGNOSIS — C50.312 MALIGNANT NEOPLASM OF LOWER-INNER QUADRANT OF LEFT FEMALE BREAST, UNSPECIFIED ESTROGEN RECEPTOR STATUS (HCC): Primary | ICD-10-CM

## 2022-06-08 DIAGNOSIS — C50.312 MALIGNANT NEOPLASM OF LOWER-INNER QUADRANT OF LEFT FEMALE BREAST, UNSPECIFIED ESTROGEN RECEPTOR STATUS (HCC): ICD-10-CM

## 2022-06-08 DIAGNOSIS — C50.912 RECURRENT BREAST CANCER, LEFT (HCC): ICD-10-CM

## 2022-06-08 LAB
ALBUMIN SERPL-MCNC: 3.7 G/DL (ref 3.5–5)
ALBUMIN/GLOB SERPL: 1.1 {RATIO} (ref 1.2–3.5)
ALP SERPL-CCNC: 92 U/L (ref 50–136)
ALT SERPL-CCNC: 20 U/L (ref 12–65)
ANION GAP SERPL CALC-SCNC: 6 MMOL/L (ref 7–16)
AST SERPL-CCNC: 12 U/L (ref 15–37)
BASOPHILS # BLD: 0 K/UL (ref 0–0.2)
BASOPHILS NFR BLD: 0 % (ref 0–2)
BILIRUB SERPL-MCNC: 1.3 MG/DL (ref 0.2–1.1)
BUN SERPL-MCNC: 13 MG/DL (ref 6–23)
CALCIUM SERPL-MCNC: 9 MG/DL (ref 8.3–10.4)
CHLORIDE SERPL-SCNC: 107 MMOL/L (ref 98–107)
CO2 SERPL-SCNC: 26 MMOL/L (ref 21–32)
CREAT SERPL-MCNC: 0.8 MG/DL (ref 0.6–1)
DIFFERENTIAL METHOD BLD: ABNORMAL
EOSINOPHIL # BLD: 0.1 K/UL (ref 0–0.8)
EOSINOPHIL NFR BLD: 1 % (ref 0.5–7.8)
ERYTHROCYTE [DISTWIDTH] IN BLOOD BY AUTOMATED COUNT: 12.5 % (ref 11.9–14.6)
GLOBULIN SER CALC-MCNC: 3.3 G/DL (ref 2.3–3.5)
GLUCOSE SERPL-MCNC: 80 MG/DL (ref 65–100)
HCT VFR BLD AUTO: 40.2 % (ref 35.8–46.3)
HGB BLD-MCNC: 13.5 G/DL (ref 11.7–15.4)
IMM GRANULOCYTES # BLD AUTO: 0 K/UL (ref 0–0.5)
IMM GRANULOCYTES NFR BLD AUTO: 1 % (ref 0–5)
LYMPHOCYTES # BLD: 1 K/UL (ref 0.5–4.6)
LYMPHOCYTES NFR BLD: 20 % (ref 13–44)
MCH RBC QN AUTO: 31.3 PG (ref 26.1–32.9)
MCHC RBC AUTO-ENTMCNC: 33.6 G/DL (ref 31.4–35)
MCV RBC AUTO: 93.1 FL (ref 79.6–97.8)
MONOCYTES # BLD: 0.4 K/UL (ref 0.1–1.3)
MONOCYTES NFR BLD: 8 % (ref 4–12)
NEUTS SEG # BLD: 3.7 K/UL (ref 1.7–8.2)
NEUTS SEG NFR BLD: 70 % (ref 43–78)
NRBC # BLD: 0 K/UL (ref 0–0.2)
PLATELET # BLD AUTO: 146 K/UL (ref 150–450)
PMV BLD AUTO: 10.4 FL (ref 9.4–12.3)
POTASSIUM SERPL-SCNC: 3.9 MMOL/L (ref 3.5–5.1)
PROT SERPL-MCNC: 7 G/DL (ref 6.3–8.2)
RBC # BLD AUTO: 4.32 M/UL (ref 4.05–5.2)
SODIUM SERPL-SCNC: 139 MMOL/L (ref 136–145)
WBC # BLD AUTO: 5.2 K/UL (ref 4.3–11.1)

## 2022-06-08 PROCEDURE — 36415 COLL VENOUS BLD VENIPUNCTURE: CPT

## 2022-06-08 PROCEDURE — 99214 OFFICE O/P EST MOD 30 MIN: CPT | Performed by: NURSE PRACTITIONER

## 2022-06-08 PROCEDURE — 80053 COMPREHEN METABOLIC PANEL: CPT

## 2022-06-08 PROCEDURE — 85025 COMPLETE CBC W/AUTO DIFF WBC: CPT

## 2022-06-08 ASSESSMENT — PATIENT HEALTH QUESTIONNAIRE - PHQ9
SUM OF ALL RESPONSES TO PHQ QUESTIONS 1-9: 0
2. FEELING DOWN, DEPRESSED OR HOPELESS: 0
1. LITTLE INTEREST OR PLEASURE IN DOING THINGS: 0
SUM OF ALL RESPONSES TO PHQ9 QUESTIONS 1 & 2: 0
SUM OF ALL RESPONSES TO PHQ QUESTIONS 1-9: 0

## 2022-06-08 NOTE — PATIENT INSTRUCTIONS
Patient Instructions from Today's Visit    Reason for Visit:  Follow up visit for breast cancer      Plan:    Follow Up:  - 4 months    Recent Lab Results:  Hospital Outpatient Visit on 06/08/2022   Component Date Value Ref Range Status    WBC 06/08/2022 5.2  4.3 - 11.1 K/uL Final    RBC 06/08/2022 4.32  4.05 - 5.2 M/uL Final    Hemoglobin 06/08/2022 13.5  11.7 - 15.4 g/dL Final    Hematocrit 06/08/2022 40.2  35.8 - 46.3 % Final    MCV 06/08/2022 93.1  79.6 - 97.8 FL Final    MCH 06/08/2022 31.3  26.1 - 32.9 PG Final    MCHC 06/08/2022 33.6  31.4 - 35.0 g/dL Final    RDW 06/08/2022 12.5  11.9 - 14.6 % Final    Platelets 19/26/0050 146* 150 - 450 K/uL Final    MPV 06/08/2022 10.4  9.4 - 12.3 FL Final    nRBC 06/08/2022 0.00  0.0 - 0.2 K/uL Final    **Note: Absolute NRBC parameter is now reported with Hemogram**    Differential Type 06/08/2022 AUTOMATED    Final    Seg Neutrophils 06/08/2022 70  43 - 78 % Final    Lymphocytes 06/08/2022 20  13 - 44 % Final    Monocytes 06/08/2022 8  4.0 - 12.0 % Final    Eosinophils % 06/08/2022 1  0.5 - 7.8 % Final    Basophils 06/08/2022 0  0.0 - 2.0 % Final    Immature Granulocytes 06/08/2022 1  0.0 - 5.0 % Final    Segs Absolute 06/08/2022 3.7  1.7 - 8.2 K/UL Final    Absolute Lymph # 06/08/2022 1.0  0.5 - 4.6 K/UL Final    Absolute Mono # 06/08/2022 0.4  0.1 - 1.3 K/UL Final    Absolute Eos # 06/08/2022 0.1  0.0 - 0.8 K/UL Final    Basophils Absolute 06/08/2022 0.0  0.0 - 0.2 K/UL Final    Absolute Immature Granulocyte 06/08/2022 0.0  0.0 - 0.5 K/UL Final           Treatment Summary has been discussed and given to patient: n/a        -------------------------------------------------------------------------------------------------------------------  Please call our office at [phone] if you have any  of the following symptoms:   · Fever of 100.5 or greater  · Chills  · Shortness of breath  · Swelling or pain in one leg    After office hours an answering service is available and will contact a provider for emergencies or if you are experiencing any of the above symptoms.  Patient did express an interest in My Chart. My Chart log in information explained on the after visit summary printout at the [facility] Patrick Mac[address] desk.     Kayleen Price RN

## 2022-06-08 NOTE — PROGRESS NOTES
[facility] Hematology and Oncology: Office Visit Established Patient      Chief Complaint:    Chief Complaint   Patient presents with    Follow-up             History of Present Illness:   Ms. Jean Marie Chino  is a 46 y.o. female who presents  today in follow-up regarding breast cancer. She noted a a lump in the inner lower quadrant of her left breast in July 2016.  She noticed this after a workout that made her sore.  When this did not resolve, she went for mammogram and ultrasound  which showed irregular and heterogeneously hypoechoic mass with spiculated margins. This overall measured 1.7 x 1.3 x 1.8 cm. She went for biopsy which showed invasive ductal carcinoma, g2, with ER 2% and RI negative, HER2 negative. MRI showed the mass  to be 2.3 cm with no other evidence of disease noted. She saw Dr. Franca Bateman who recommended oncology consultation to discuss therapy options. After discussion, she opted for upfront bilateral mastectomy, which was completed on 8/30/16, sentinel node  negative. Completed ddAC followed by T in March 2017. No radiation due to T2N0 disease with mastectomy. Tamoxifen started for (low) ER expression but then stopped due to toxicity, then just monitored on observation. BRCA negative. March 2021, she  noted a new nodule in the left breast, MRI imaging showed the mass to be 1.3 cm in size and was suspicious enough that biopsy was recommended. Unfortunately, this confirms breast cancer, high grade, triple negative. MRI did not show any distant disease. We recommended surgical evaluation to discuss resection. Path reviewed and showed the tumor to be 1.6 cm, 9 lymph nodes were negative ([id number]). Her updated genetics were negative. We discussed the role of adjuvant therapy, she cannot receive anthracyclines again, but I would like to incorporate carboplatin given the triple negative histology and the desire for multiagent chemotherapy.   I would  lean toward Taxotere and carboplatin q3w for 6 cycles (TCH minus H). She went to [facility] last week for 2nd opinion and was pleased with the visit, they found a 1.6 cm \"low suspicion\" mass and performed FNA which was negative, CT and bone scan were also  negative. She met with medical oncology and was recommended to undergo adjuvant chemotherapy, they recommended carboplatin and gemcitabine but were OK with Taxotere and carboplatin. She was also recommended to undergo adjuvant radiation, we discussed  that this would be after chemotherapy was completed. She completed 4 cycles of Taxotere/carboplatin before stopping therapy due to severe GI symptoms and profound fatigue. She was referred to radiation afterward and completed this in September 2021  with good tolerance. She returns for routine follow up. Overall, she has been well since last seen. Appetite is good and weight is stable. Energy level is good. There is no cough, shortness of breath, or edema. She denies any pain. This afternoon she is seeing Dr Kari Medel for hard/tightness to left chest wall s/p XRT. There are no new concerns or complaints. Review of Systems:   Constitutional: Negative. HENT: Negative. Eyes: Negative. Respiratory: Negative. Cardiovascular: Negative. Gastrointestinal: Negative. Genitourinary: Negative. Musculoskeletal: Negative. Skin: Negative. Neurological: Negative. Endo/Heme/Allergies: Negative. Psychiatric/Behavioral: Negative. All other systems reviewed and are negative.       Allergies   Allergen Reactions    Guaifenesin Hives     Other reaction(s): Unknown    Hydrocodone-Acetaminophen Itching     Other reaction(s): Unknown    Ondansetron Other (See Comments)     \"severe migraines\"    Ondansetron Hcl Other (See Comments)     Past Medical History:   Diagnosis Date    [facility] [address]     Cancer ([facility] dx--7/28/16    left breast cancer--- surgery and chemo     MRSA infection 2014    LEFT EAR     Past Surgical History:   Procedure Laterality Date    BREAST BIOPSY Left 03/02/2021    BREAST LUMPECTOMY Left 3/31/2021    LEFT BREAST LUMPECTOMY performed by Luis Day MD at [facility] Left 3/31/2021    LEFT BREAST COMPLEX CLOSURE performed by Aguilar Allen MD at [facility] Bilateral 8/30/2016    BREAST TISSUE EXPANDER INSERTION AND NIPPLE SPARING/ BILATERAL  performed by Hanh Perea MD at [facility] Bilateral 4/5/2017    BILATERAL BREAST TISSUE EXPANDER REMOVAL /PLACEMENT OF PERMANENT IMPLANTS performed by Hanh Perea MD at [facility] Bilateral 7/6/2020    BILATERAL REVISION OF BREAST RECONSTRUCTION/ performed by Aguilar Allen MD at [address] [place] [address]  2012    x 1     IR PORT PLACEMENT EQUAL OR GREATER THAN 5 YEARS  4/15/2021    IR PORT PLACEMENT EQUAL OR GREATER THAN 5 YEARS  4/15/2021    IR PORT PLACEMENT EQUAL OR GREATER THAN 5 YEARS 4/15/2021 [facility] SPECI[facility]    IR REMOVE TUNNELED VAD W PORT  8/5/2021    KNEE ARTHROSCOPY Right 2004    MASTECTOMY Bilateral 8/30/2016    BREAST MASTECTOMY SIMPLE BILATERAL performed by Lobito Lopez MD at [facility] Right 2013    cyst removed from foot    ORTHOPEDIC SURGERY Right 08/15/2019    RIGHT [facility] (Right Foot)    OTHER SURGICAL HISTORY  01/14/2019    RIGHT FOOT EXCISION SOFT TISSUE MASS (Right Foot)     US BREAST NEEDLE BIOPSY LEFT Left 3/2/2021    US BREAST NEEDLE BIOPSY LEFT 3/2/2021 [facility]    VASCULAR SURGERY  2016    life port, and removed    WISDOM TOOTH EXTRACTION       Family History   Problem Relation Age of Onset    Cancer Sister         THYROID    Heart Attack Maternal Grandfather     Hypertension Mother     Heart Disease Maternal Grandfather     Stroke Paternal Grandmother     Bipolar Disorder Sister     Diabetes Paternal Grandmother      Social History     Socioeconomic History    Marital status:      Spouse name: Not on file    Number of children: Not on file    Years of education: Not on file    Highest education level: Not on file   Occupational History    Not on file   Tobacco Use    Smoking status: Never Smoker    Smokeless tobacco: Never Used   Substance and Sexual Activity    Alcohol use: Yes     Alcohol/week: 0.0 standard drinks    Drug use: No    Sexual activity: Not on file   Other Topics Concern    Not on file   Social History Narrative    Not on file     Social Determinants of Health     Financial Resource Strain:     Difficulty of Paying Living Expenses: Not on file   Food Insecurity:     Worried About Running Out of Food in the Last Year: Not on file    Stevenson of Food in the Last Year: Not on file   Transportation Needs:     Lack of Transportation (Medical): Not on file    Lack of Transportation (Non-Medical):  Not on file   Physical Activity:     Days of Exercise per Week: Not on file    Minutes of Exercise per Session: Not on file   Stress:     Feeling of Stress : Not on file   Social Connections:     Frequency of Communication with Friends and Family: Not on file    Frequency of Social Gatherings with Friends and Family: Not on file    Attends Muslim Services: Not on file    Active Member of [address] [place] [address] [employer] or Organizations: Not on file    Attends Club or Organization Meetings: Not on file    Marital Status: Not on file   Intimate Partner Violence:     Fear of Current or Ex-Partner: Not on file    Emotionally Abused: Not on file    Physically Abused: Not on file    Sexually Abused: Not on file   Housing Stability:     Unable to Pay for Housing in the Last Year: Not on file    Number of [place] in the Last Year: Not on file    Unstable Housing in the Last Year: Not on file     Current Outpatient Medications Medication Sig Dispense Refill    LORazepam (ATIVAN) 1 MG tablet Take 1 mg by mouth every 4 hours as needed. (Patient not taking: Reported on 2022)       No current facility-administered medications for this visit. OBJECTIVE:  Vitals:    22 0901   BP: 109/76   Site: Right Upper Arm   Position: Sitting   Cuff Size: Large Adult   Pulse: 82   Resp: 20   Temp: 98.1 °F (36.7 °C)   TempSrc: Oral   SpO2: 91%   Weight: 178 lb 11.2 oz (81.1 kg)   Height: 5' 4\" (1.626 m)       ECO  PHQ-9  2022   Little interest or pleasure in doing things 0   Little interest or pleasure in doing things -   Feeling down, depressed, or hopeless 0   Trouble falling or staying asleep, or sleeping too much -   Feeling tired or having little energy -   Poor appetite, weight loss, or overeating -   Feeling bad about yourself - or that you are a failure or have let yourself or your family down -   Trouble concentrating on things such as school, work, reading, or watching TV -   Moving or speaking so slowly that other people could have noticed; or the opposite being so fidgety that others notice -   Thoughts of being better off dead, or hurting yourself in some way -   PHQ-2 Score 0   Total Score PHQ 2 -   PHQ-9 Total Score 0   PHQ 9 Score -         Physical Exam:     Constitutional:  Well developed, well nourished female in no acute  distress, sitting comfortably on the examination table. HEENT:  Normocephalic and atraumatic. Sclerae anicteric. Skin  Warm and dry. No bruising and no rash noted. No erythema. No pallor. Lymph No palpable cervical, submental, supraclavicular, or axillary nodes. Respiratory Clear lung sounds in all field. No wheezes or rhonchi. No accessory muscle use. CVS Regular rate and rhythm. No murmur. Neuro  Grossly nonfocal with no obvious sensory or motor deficits. MSK  Normal range of motion in general.  No edema and no tenderness.      Psych  Appropriate mood and affect.              Labs:  Hospital Outpatient Visit on 06/08/2022   Component Date Value Ref Range Status    WBC 06/08/2022 5.2  4.3 - 11.1 K/uL Final    RBC 06/08/2022 4.32  4.05 - 5.2 M/uL Final    Hemoglobin 06/08/2022 13.5  11.7 - 15.4 g/dL Final    Hematocrit 06/08/2022 40.2  35.8 - 46.3 % Final    MCV 06/08/2022 93.1  79.6 - 97.8 FL Final    MCH 06/08/2022 31.3  26.1 - 32.9 PG Final    MCHC 06/08/2022 33.6  31.4 - 35.0 g/dL Final    RDW 06/08/2022 12.5  11.9 - 14.6 % Final    Platelets 74/66/0319 146* 150 - 450 K/uL Final    MPV 06/08/2022 10.4  9.4 - 12.3 FL Final    nRBC 06/08/2022 0.00  0.0 - 0.2 K/uL Final    **Note: Absolute NRBC parameter is now reported with Hemogram**    Differential Type 06/08/2022 AUTOMATED    Final    Seg Neutrophils 06/08/2022 70  43 - 78 % Final    Lymphocytes 06/08/2022 20  13 - 44 % Final    Monocytes 06/08/2022 8  4.0 - 12.0 % Final    Eosinophils % 06/08/2022 1  0.5 - 7.8 % Final    Basophils 06/08/2022 0  0.0 - 2.0 % Final    Immature Granulocytes 06/08/2022 1  0.0 - 5.0 % Final    Segs Absolute 06/08/2022 3.7  1.7 - 8.2 K/UL Final    Absolute Lymph # 06/08/2022 1.0  0.5 - 4.6 K/UL Final    Absolute Mono # 06/08/2022 0.4  0.1 - 1.3 K/UL Final    Absolute Eos # 06/08/2022 0.1  0.0 - 0.8 K/UL Final    Basophils Absolute 06/08/2022 0.0  0.0 - 0.2 K/UL Final    Absolute Immature Granulocyte 06/08/2022 0.0  0.0 - 0.5 K/UL Final    Sodium 06/08/2022 139  136 - 145 mmol/L Final    Potassium 06/08/2022 3.9  3.5 - 5.1 mmol/L Final    Chloride 06/08/2022 107  98 - 107 mmol/L Final    CO2 06/08/2022 26  21 - 32 mmol/L Final    Anion Gap 06/08/2022 6* 7 - 16 mmol/L Final    Glucose 06/08/2022 80  65 - 100 mg/dL Final    BUN 06/08/2022 13  6 - 23 MG/DL Final    CREATININE 06/08/2022 0.80  0.6 - 1.0 MG/DL Final    GFR  06/08/2022 >60  >60 ml/min/1.73m2 Final    GFR Non- 06/08/2022 >60  >60 ml/min/1.73m2 Final Comment:      Estimated GFR is calculated using the Modification of Diet in Renal Disease (MDRD) Study equation, reported for both  Americans (GFRAA) and non- Americans (GFRNA), and normalized to 1.73m2 body surface area. The physician must decide which value applies to the patient. The MDRD study equation should only be used in individuals age 25 or older. It has not been validated for the following: pregnant women, patients with serious comorbid conditions,or on certain medications, or persons with extremes of body size, muscle mass, or nutritional status.       Calcium 06/08/2022 9.0  8.3 - 10.4 MG/DL Final    Total Bilirubin 06/08/2022 1.3* 0.2 - 1.1 MG/DL Final    ALT 06/08/2022 20  12 - 65 U/L Final    AST 06/08/2022 12* 15 - 37 U/L Final    Alk Phosphatase 06/08/2022 92  50 - 136 U/L Final    Total Protein 06/08/2022 7.0  6.3 - 8.2 g/dL Final    Albumin 06/08/2022 3.7  3.5 - 5.0 g/dL Final    Globulin 06/08/2022 3.3  2.3 - 3.5 g/dL Final    Albumin/Globulin Ratio 06/08/2022 1.1* 1.2 - 3.5   Final             Imaging:   BREAST MRI BEFORE AND AFTER CONTRAST       CLINICAL HISTORY:  72-year-old with recent needle biopsy diagnosis of a palpable   left infiltrating duct carcinoma; for preoperative evaluation.       TECHNIQUE: Standard axial and sagittal images were obtained before and during   bolus injection of 14 cc of MultiHance IV.  CAD was employed.       COMPARISON:  Multiple prior studies including the baseline bilateral mammography   of July 20, 2016 and left ultrasound biopsy of July 26, 2016.       FINDINGS: There is moderate background parenchymal enhancement bilaterally.     Nodular enhancement surrounding the left breast 8:30 biopsy marker clip has   maximal diameter of 2.3 cm by MRI.  There is preservation of fat plane between   the mass and underlying pectoralis.  There are very small, normal-appearing   intramammary lymph nodes bilaterally.  No suspicious enhancing focus elsewhere   in either breast is distinguished from the background parenchymal enhancement.     No pathologically enlarged or dysmorphic lymph nodes are seen.  The liver,   lungs, and chest wall appear unremarkable as imaged.       IMPRESSION:          1.  SOLITARY LEFT BREAST 8:30 CARCINOMA HAS MAXIMAL DIAMETER OF 2.3 CM BY MRI   WITHOUT EVIDENCE OF MALIGNANCY ELSEWHERE IN EITHER BREAST OR METASTATIC DISEASE   IN THE IMAGED CHEST.       2.  MODERATE BACKGROUND PARENCHYMAL ENHANCEMENT LIMITS EVALUATION FOR POSSIBLE   SMALL MALIGNANCY, AND THEREFORE FOLLOW-UP MRI IS RECOMMENDED IN ONE YEAR. Pathology:                                   ASSESSMENT:    ICD-10-CM    1. Malignant neoplasm of lower-inner quadrant of left female breast, unspecified estrogen receptor status ([facility] [address]  C50.312    2. Recurrent breast cancer, left ([facility] [address]  C50.912                ICD-10-CM  ICD-9-CM            1.  Malignant neoplasm of lower-inner quadrant of left breast in female, estrogen receptor negative ([facility]CC)   C50.312  174.3  CBC WITH AUTOMATED DIFF           O46.5  X33.1  METABOLIC PANEL, COMPREHENSIVE       Patient Active Problem List   Diagnosis    Hypomagnesemia    Recurrent breast cancer, left (HCC)    Dehydration    Pain, generalized    Neutropenic fever ([address]    Post term pregnancy over 40 weeks    Encounter for IUD removal and reinsertion    S/P bilateral mastectomy    Malignant neoplasm of lower-inner quadrant of left female breast [facility])        PLAN:   Lab studies were personally reviewed. Breast cancer: 2.6 cm, g2, IDC, ER 2%, SD negative, HER2 negative. S/p bilateral mastectomy and L sentinel node, negative. [id number] (stage IIA). Her tumor, while only T2N0, is essentially triple negative, rendering endocrine therapy less effective. Therefore, I recommend that chemotherapy be administered in the adjuvant setting for risk reduction.    I recommend the use of Adriamycin plus Cytoxan administered in a dose dense fashion every two weeks for four cycles, with growth factor support, followed by weekly paclitaxel. She will not require  radiation due to the N0 disease and mastectomy. We previously reviewed fertility preservation options and she wishes to proceed without intervention. BRCA testing negative. She completed ddAC-wP in March 2017 and tolerated treatment very well. Short  trial of Tamoxifen but discontinued due to headaches. March 2021, she noted a new nodule in the left breast, MRI imaging showed the mass to be 1.3 cm in size and was suspicious enough that biopsy was  recommended. Unfortunately, this confirms breast cancer, high grade, triple negative. MRI did not show any distant disease. We recommended surgical evaluation to discuss resection. Path reviewed  and showed the tumor to be 1.6 cm, 9 lymph nodes were negative ([id number]). Her updated genetics were negative. We discussed the role of adjuvant therapy, she cannot receive anthracyclines again, but  I would like to incorporate carboplatin given the triple negative histology and the desire for multiagent chemotherapy. I would lean toward Taxotere and carboplatin q3w for 6 cycles (TCH minus H). She went to [facility] for 2nd opinion and was pleased with  the visit, they found a 1.6 cm \"low suspicion\" mass and performed FNA which was negative, CT and bone scan were also negative. She met with medical oncology and was recommended to undergo adjuvant chemotherapy, they recommended carboplatin and gemcitabine  but were OK with Taxotere and carboplatin. She was also recommended to undergo adjuvant radiation, we discussed that this would be after chemotherapy was completed. She returns for routine follow up. Overall, she has been well since last seen. Appetite is good and weight is stable. Energy level is good. There is no cough, shortness of breath, or edema. She denies any pain.   This afternoon she is seeing Dr Timmy Mendez for hard/tightness to left chest wall s/p XRT. There are no new concerns or complaints. Labs reviewed. All questions were asked and answered to the best of my ability. F/u  in 4 months for clinical recheck. Breast MRI due in March 2023.             Amrita Hansen) [address] [place] [address] Hematology and Oncology  [address] [place] [address]  Office : [phone]  Fax : [phone]

## 2022-06-30 NOTE — PROGRESS NOTES
Care Management Interventions  PCP Verified by CM: Yes (Lela Delgado)  Mode of Transport at Discharge:  (Family)  Transition of Care Consult (CM Consult): Discharge Planning  MyChart Signup: No  Discharge Durable Medical Equipment: No  Physical Therapy Consult: No  Occupational Therapy Consult: No  Speech Therapy Consult: No  Current Support Network: Own Home, Lives with Spouse  Confirm Follow Up Transport: Family  The Plan for Transition of Care is Related to the Following Treatment Goals : Work with patient until back to baseline  The Patient and/or Patient Representative was Provided with a Choice of Provider and Agrees with the Discharge Plan?: Yes  Freedom of Choice List was Provided with Basic Dialogue that Supports the Patient's Individualized Plan of Care/Goals, Treatment Preferences and Shares the Quality Data Associated with the Providers?: Yes   Resource Information Provided?: No  Discharge Location  Discharge Placement: Home with family assistance    SW met with patient and her mother while social distancing w/appropriate PPE. Patient lives with her spouse Alana Cyr) and is independent w/ADLs. Patient does not require any DME. Patient has support of local family members. PCP is [facility]. Patient denied any needs from  at this time. SW will continue to follow.     MIREYA Kirk-AGLO  [address] Patient/Caregiver provided printed discharge information.

## 2022-09-15 ENCOUNTER — OFFICE VISIT (OUTPATIENT)
Dept: OBGYN CLINIC | Age: 42
End: 2022-09-15
Payer: COMMERCIAL

## 2022-09-15 ENCOUNTER — APPOINTMENT (OUTPATIENT)
Dept: RADIATION ONCOLOGY | Age: 42
End: 2022-09-15

## 2022-09-15 VITALS
HEIGHT: 64 IN | SYSTOLIC BLOOD PRESSURE: 118 MMHG | DIASTOLIC BLOOD PRESSURE: 68 MMHG | WEIGHT: 180 LBS | BODY MASS INDEX: 30.73 KG/M2

## 2022-09-15 DIAGNOSIS — Z13.89 SCREENING FOR GENITOURINARY CONDITION: ICD-10-CM

## 2022-09-15 DIAGNOSIS — Z11.51 SCREENING FOR HPV (HUMAN PAPILLOMAVIRUS): ICD-10-CM

## 2022-09-15 DIAGNOSIS — Z01.419 WELL WOMAN EXAM: Primary | ICD-10-CM

## 2022-09-15 LAB
BILIRUBIN, URINE, POC: NEGATIVE
BLOOD URINE, POC: NORMAL
GLUCOSE URINE, POC: NEGATIVE
KETONES, URINE, POC: NEGATIVE
LEUKOCYTE ESTERASE, URINE, POC: NEGATIVE
NITRITE, URINE, POC: NEGATIVE
PH, URINE, POC: 5 (ref 4.6–8)
PROTEIN,URINE, POC: NEGATIVE
SPECIFIC GRAVITY, URINE, POC: 1.03 (ref 1–1.03)
URINALYSIS CLARITY, POC: CLEAR
URINALYSIS COLOR, POC: YELLOW
UROBILINOGEN, POC: NORMAL

## 2022-09-15 PROCEDURE — 81003 URINALYSIS AUTO W/O SCOPE: CPT | Performed by: NURSE PRACTITIONER

## 2022-09-15 PROCEDURE — 99386 PREV VISIT NEW AGE 40-64: CPT | Performed by: NURSE PRACTITIONER

## 2022-09-15 RX ORDER — LEVONORGESTREL 52 MG/1
1 INTRAUTERINE DEVICE INTRAUTERINE ONCE
COMMUNITY

## 2022-09-15 NOTE — PROGRESS NOTES
Patient presents today for a routine gynecological examination with no complaints. Declines breast exam today, states she has repeat MRIs and sees her oncologist next month for a f/u as well. Has a h/o breast CA x 2. Mirena for [facility]- this was replaced 2018  Oncology is amenable to her continuing mirena. OB History          2    Para        Term   1            AB   1    Living   1         SAB        IAB        Ectopic        Molar        Multiple        Live Births                      GYN History     Last pap: 18 Neg/HPV Neg  Last MMG: 3/2/21 Lt mass at 8 o'clock, bx: LEFT BREAST, 8:00 POSITION, 6 CM FROM NIPPLE, CORE BIOPSY\":         INFILTRATING DUCT CARCINOMA, HIGH GRADE (POORLY DIFFERENTIATED). DEFINITE IN SITU COMPONENT AND LYMPHOVASCULAR INVASION ARE NOT IDENTIFIED        No LMP recorded (exact date). (Menstrual status: IUD).    negative postcoital bleeding    Past Medical History:  Past Medical History:   Diagnosis Date    Breast cancer ([address]     x 2    Cancer ([facility] [facility] dx--16    left breast cancer--- surgery and chemo     MRSA infection     LEFT EAR       Past Surgical History:  Past Surgical History:   Procedure Laterality Date    BREAST BIOPSY Left 2021    BREAST LUMPECTOMY Left 3/31/2021    LEFT BREAST LUMPECTOMY performed by Tanya Ga MD at [address] [place] [address] Left 3/31/2021    LEFT BREAST COMPLEX CLOSURE performed by Shaun Rooney MD at [address] [place] [address] Bilateral 2016    BREAST TISSUE EXPANDER INSERTION AND NIPPLE SPARING/ BILATERAL  performed by Julian Watson MD at [address] [place] [address] Bilateral 2017    BILATERAL BREAST TISSUE EXPANDER REMOVAL /PLACEMENT OF PERMANENT IMPLANTS performed by Julian Watson MD at [address] [place] [address] Bilateral 2020    BILATERAL REVISION OF BREAST RECONSTRUCTION/ performed by Shaun Rooney MD at [address] [place] [address] CERVICAL POLYP REMOVAL      UTERINE     SECTION  2012    x 1     IR PORT PLACEMENT EQUAL OR GREATER THAN 5 YEARS  4/15/2021    IR PORT PLACEMENT EQUAL OR GREATER THAN 5 YEARS  4/15/2021    IR PORT PLACEMENT EQUAL OR GREATER THAN 5 YEARS 4/15/2021 [facility] SPECI[facility]    IR REMOVE TUNNELED VAD W PORT  2021    KNEE ARTHROSCOPY Right 2004    MASTECTOMY Bilateral 2016    BREAST MASTECTOMY SIMPLE BILATERAL performed by Georgi Sanchez MD at . [address] Right 2013    cyst removed from foot    ORTHOPEDIC SURGERY Right 08/15/2019    RIGHT [facility] (Right Foot)    OTHER SURGICAL HISTORY  2019    RIGHT FOOT EXCISION SOFT TISSUE MASS (Right Foot)     US BREAST NEEDLE BIOPSY LEFT Left 3/2/2021    US BREAST NEEDLE BIOPSY LEFT 3/2/2021 [facility] M[facility]MO    VASCULAR SURGERY  2016    life port, and removed    WISDOM TOOTH EXTRACTION         Allergies: Allergies   Allergen Reactions    Guaifenesin Hives     Other reaction(s): Unknown    Hydrocodone-Acetaminophen Itching     Other reaction(s): Unknown    Ondansetron Other (See Comments)     \"severe migraines\"    Ondansetron Hcl Other (See Comments)       Medication History:  Current Outpatient Medications   Medication Sig Dispense Refill    levonorgestrel (MIRENA, 52 MG,) IUD 52 mg 1 each by IntraUTERine route once Indications: Inserted        No current facility-administered medications for this visit. Social History:  Social History     Socioeconomic History    Marital status:      Spouse name: Not on file    Number of children: Not on file    Years of education: Not on file    Highest education level: Not on file   Occupational History    Not on file   Tobacco Use    Smoking status: Never    Smokeless tobacco: Never   Substance and Sexual Activity    Alcohol use:  Yes     Alcohol/week: 0.0 standard drinks    Drug use: No    Sexual activity: Not on file   Other Topics Concern    Not on file Social History Narrative    Not on file     Social Determinants of Health     Financial Resource Strain: Not on file   Food Insecurity: Not on file   Transportation Needs: Not on file   Physical Activity: Not on file   Stress: Not on file   Social Connections: Not on file   Intimate Partner Violence: Not on file   Housing Stability: Not on file       Family History:  Family History   Problem Relation Age of Onset    Cancer Sister         THYROID    Heart Attack Maternal Grandfather     Hypertension Mother     Heart Disease Maternal Grandfather     Stroke Paternal Grandmother     Bipolar Disorder Sister     Diabetes Paternal Grandmother        Review of Systems - General ROS: negative except for that discussed in HPI      ROS:  Feeling well. No dyspnea or chest pain on exertion. No abdominal pain, change in bowel habits, black or bloody stools. No urinary tract symptoms. No neurological complaints. Objective:   /68   Ht 5' 4\" (1.626 m)   Wt 180 lb (81.6 kg)   LMP  (Exact Date)   BMI 30.90 kg/m²   The patient appears well, alert, oriented x 3, in no distress. ENT normal.  Neck supple. No adenopathy or thyromegaly. Lungs:  clear, good air entry, no wheezes, rhonchi or rales. Heart:  S1 and S2 normal, no murmurs, regular rate and rhythm. Abdomen:  soft without tenderness, guarding, mass or organomegaly. Extremities show no edema, normal peripheral pulses. Neurological is normal, no focal findings. BREAST EXAM: not examined    PELVIC EXAM: VULVA: normal appearing vulva with no masses, tenderness or lesions, VAGINA: normal appearing vagina with normal color and discharge, no lesions, CERVIX: normal appearing cervix without discharge or lesions, iud strings seen at os UTERUS: uterus is normal size, shape, consistency and nontender, ADNEXA: normal adnexa in size, nontender and no masses    Assessment/Plan:     1.  Well woman exam    - AMB POC URINALYSIS DIP STICK AUTO W/O MICRO  - PAP IG, Aptima HPV and rfx 16/18,45 ([id number]); Future    2. Screening for HPV (human papillomavirus)    - PAP IG, Aptima HPV and rfx 16/18,45 ([id number]); Future    3. Screening for genitourinary condition    - AMB POC URINALYSIS DIP STICK AUTO W/O MICRO     Continue mirena  pap smear  return annually or prn    Supervising physician is Dr. Samson Arredondo.

## 2022-09-20 LAB
CYTOLOGIST CVX/VAG CYTO: NORMAL
CYTOLOGY CVX/VAG DOC THIN PREP: NORMAL
HPV APTIMA: NEGATIVE
Lab: NORMAL
PATH REPORT.FINAL DX SPEC: NORMAL
STAT OF ADQ CVX/VAG CYTO-IMP: NORMAL

## 2022-10-10 ENCOUNTER — TELEPHONE (OUTPATIENT)
Dept: ONCOLOGY | Age: 42
End: 2022-10-10

## 2022-10-10 NOTE — TELEPHONE ENCOUNTER
Patient is notified that her lab orders are faxed to the number requested.  She has them drawn at her work

## 2022-10-10 NOTE — TELEPHONE ENCOUNTER
Pt called regarding lab appt on 10/12/22 @ 9am/states gets labs on at [facility]/please fax lab orders to there    Fax: [phone]  Phone: [phone]

## 2022-10-12 ENCOUNTER — OFFICE VISIT (OUTPATIENT)
Dept: ONCOLOGY | Age: 42
End: 2022-10-12
Payer: COMMERCIAL

## 2022-10-12 VITALS
HEIGHT: 64 IN | HEART RATE: 72 BPM | WEIGHT: 181 LBS | TEMPERATURE: 97.5 F | RESPIRATION RATE: 16 BRPM | SYSTOLIC BLOOD PRESSURE: 107 MMHG | DIASTOLIC BLOOD PRESSURE: 67 MMHG | BODY MASS INDEX: 30.9 KG/M2 | OXYGEN SATURATION: 96 %

## 2022-10-12 DIAGNOSIS — R53.83 OTHER FATIGUE: ICD-10-CM

## 2022-10-12 DIAGNOSIS — C50.312 MALIGNANT NEOPLASM OF LOWER-INNER QUADRANT OF LEFT FEMALE BREAST, UNSPECIFIED ESTROGEN RECEPTOR STATUS (HCC): Primary | ICD-10-CM

## 2022-10-12 PROCEDURE — 99214 OFFICE O/P EST MOD 30 MIN: CPT | Performed by: INTERNAL MEDICINE

## 2022-10-12 ASSESSMENT — PATIENT HEALTH QUESTIONNAIRE - PHQ9
SUM OF ALL RESPONSES TO PHQ QUESTIONS 1-9: 0
2. FEELING DOWN, DEPRESSED OR HOPELESS: 0
SUM OF ALL RESPONSES TO PHQ QUESTIONS 1-9: 0
SUM OF ALL RESPONSES TO PHQ QUESTIONS 1-9: 0
SUM OF ALL RESPONSES TO PHQ9 QUESTIONS 1 & 2: 0
1. LITTLE INTEREST OR PLEASURE IN DOING THINGS: 0
SUM OF ALL RESPONSES TO PHQ QUESTIONS 1-9: 0

## 2022-10-12 NOTE — PROGRESS NOTES
[facility] Hematology and Oncology: Office Visit Established Patient      Chief Complaint:    Chief Complaint   Patient presents with    Follow-up             History of Present Illness:   Ms. Ruddy Barnard  is a 46 y.o. female who presents  today in follow-up regarding breast cancer. She noted a a lump in the inner lower quadrant of her left breast in July 2016. She noticed this after a workout that made her sore. When this did not resolve, she went for mammogram and ultrasound  which showed irregular and heterogeneously hypoechoic mass with spiculated margins. This overall measured 1.7 x 1.3 x 1.8 cm. She went for biopsy which showed invasive ductal carcinoma, g2, with ER 2% and FL negative, HER2 negative. MRI showed the mass  to be 2.3 cm with no other evidence of disease noted. She saw Dr. Roman Doshi who recommended oncology consultation to discuss therapy options. After discussion, she opted for upfront bilateral mastectomy, which was completed on 8/30/16, sentinel node  negative. Completed ddAC followed by T in March 2017. No radiation due to T2N0 disease with mastectomy. Tamoxifen started for (low) ER expression but then stopped due to toxicity, then just monitored on observation. BRCA negative. March 2021, she  noted a new nodule in the left breast, MRI imaging showed the mass to be 1.3 cm in size and was suspicious enough that biopsy was recommended. Unfortunately, this confirms breast cancer, high grade, triple negative. MRI did not show any distant disease. We recommended surgical evaluation to discuss resection. Path reviewed and showed the tumor to be 1.6 cm, 9 lymph nodes were negative ([id number]). Her updated genetics were negative. We discussed the role of adjuvant therapy, she cannot receive anthracyclines again, but I would like to incorporate carboplatin given the triple negative histology and the desire for multiagent chemotherapy.   I would  lean toward Taxotere and carboplatin q3w for 6 cycles (TCH minus H). She went to [facility] last week for 2nd opinion and was pleased with the visit, they found a 1.6 cm \"low suspicion\" mass and performed FNA which was negative, CT and bone scan were also  negative. She met with medical oncology and was recommended to undergo adjuvant chemotherapy, they recommended carboplatin and gemcitabine but were OK with Taxotere and carboplatin. She was also recommended to undergo adjuvant radiation, we discussed  that this would be after chemotherapy was completed. She completed 4 cycles of Taxotere/carboplatin before stopping therapy due to severe GI symptoms and profound fatigue. She was referred to radiation afterward and completed this in September 2021 with good tolerance. She returns for routine follow up. Overall, she has been well since last seen. Her main complaint is fatigue since chemotherapy and radiation, this is not severe enough to affect day to day activities but she does feel like it affects her quality of life. Appetite is good and weight is stable. Energy level is good. She denies any pain. She is having revision of her reconstruction next month for implant contracture s/p radiation. There are no new concerns or complaints. Review of Systems:   Constitutional: Positive for fatigue. HENT: Negative. Eyes: Negative. Respiratory: Negative. Cardiovascular: Negative. Gastrointestinal: Negative. Genitourinary: Negative. Musculoskeletal: Negative. Skin: Negative. Neurological: Negative. Endo/Heme/Allergies: Negative. Psychiatric/Behavioral: Negative. All other systems reviewed and are negative.       Allergies   Allergen Reactions    Guaifenesin Hives     Other reaction(s): Unknown    Hydrocodone-Acetaminophen Itching     Other reaction(s): Unknown    Ondansetron Other (See Comments)     \"severe migraines\"    Ondansetron Hcl Other (See Comments)     Past Medical History:   Diagnosis Date    Breast cancer ([facility] [address]     x 2    Cancer ([facility] [address] dx--7/28/16    left breast cancer--- surgery and chemo     MRSA infection 2014    LEFT EAR     Past Surgical History:   Procedure Laterality Date    BREAST BIOPSY Left 03/02/2021    BREAST LUMPECTOMY Left 3/31/2021    LEFT BREAST LUMPECTOMY performed by Ilir Orta MD at [address] [place] [address] Left 3/31/2021    LEFT BREAST COMPLEX CLOSURE performed by Ashlee Santana MD at [address] [place] [address] Bilateral 8/30/2016    BREAST TISSUE EXPANDER INSERTION AND NIPPLE SPARING/ BILATERAL  performed by Bakari Velázquez MD at [address] [place] [address] Bilateral 4/5/2017    BILATERAL BREAST TISSUE EXPANDER REMOVAL /PLACEMENT OF PERMANENT IMPLANTS performed by Bakari Velázquez MD at [address] [place] [address] Bilateral 7/6/2020    BILATERAL REVISION OF BREAST RECONSTRUCTION/ performed by Ashlee Santana MD at [place] [address]  2012    x 1     IR PORT PLACEMENT EQUAL OR GREATER THAN 5 YEARS  4/15/2021    IR PORT PLACEMENT EQUAL OR GREATER THAN 5 YEARS  4/15/2021    IR PORT PLACEMENT EQUAL OR GREATER THAN 5 YEARS 4/15/2021 [facility] SPECIALS    IR REMOVE TUNNELED VAD W PORT  8/5/2021    KNEE ARTHROSCOPY Right 2004    MASTECTOMY Bilateral 8/30/2016    BREAST MASTECTOMY SIMPLE BILATERAL performed by Cindi Faye MD at . [facility] Right 2013    cyst removed from foot    ORTHOPEDIC SURGERY Right 08/15/2019    RIGHT [facility] (Right Foot)    OTHER SURGICAL HISTORY  01/14/2019    RIGHT FOOT EXCISION SOFT TISSUE MASS (Right Foot)     US BREAST NEEDLE BIOPSY LEFT Left 3/2/2021    US BREAST NEEDLE BIOPSY LEFT 3/2/2021 [facility]    VASCULAR SURGERY  2016    life port, and removed    WISDOM TOOTH EXTRACTION       Family History   Problem Relation Age of Onset    Cancer Sister         THYROID    Heart Attack Maternal Grandfather     Hypertension Mother     Heart Disease Maternal Grandfather     Stroke Paternal Grandmother     Bipolar Disorder Sister     Diabetes Paternal Grandmother      Social History     Socioeconomic History    Marital status:      Spouse name: Not on file    Number of children: Not on file    Years of education: Not on file    Highest education level: Not on file   Occupational History    Not on file   Tobacco Use    Smoking status: Never    Smokeless tobacco: Never   Substance and Sexual Activity    Alcohol use: Yes     Alcohol/week: 0.0 standard drinks    Drug use: No    Sexual activity: Not on file   Other Topics Concern    Not on file   Social History Narrative    Not on file     Social Determinants of Health     Financial Resource Strain: Not on file   Food Insecurity: Not on file   Transportation Needs: Not on file   Physical Activity: Not on file   Stress: Not on file   Social Connections: Not on file   Intimate Partner Violence: Not on file   Housing Stability: Not on file     Current Outpatient Medications   Medication Sig Dispense Refill    levonorgestrel (MIRENA, 52 MG,) IUD 52 mg 1 each by IntraUTERine route once Indications: Inserted        No current facility-administered medications for this visit.            OBJECTIVE:  Vitals:    10/12/22 0919   BP: 107/67   Site: Right Upper Arm   Position: Sitting   Cuff Size: Medium Adult   Pulse: 72   Resp: 16   Temp: 97.5 °F (36.4 °C)   TempSrc: Oral   SpO2: 96%   Weight: 181 lb (82.1 kg)   Height: 5' 4\" (1.626 m)   Pain Score:   0 - No pain (fatigue-5)   ECO  PHQ-9  10/12/2022   Little interest or pleasure in doing things 0   Little interest or pleasure in doing things -   Feeling down, depressed, or hopeless 0   Trouble falling or staying asleep, or sleeping too much -   Feeling tired or having little energy -   Poor appetite, weight loss, or overeating -   Feeling bad about yourself - or that you are a failure or have let yourself or your family down -   Trouble concentrating on things such as school, work, reading, or watching TV -   Moving or speaking so slowly that other people could have noticed; or the opposite being so fidgety that others notice -   Thoughts of being better off dead, or hurting yourself in some way -   PHQ-2 Score 0   Total Score PHQ 2 -   PHQ-9 Total Score 0   PHQ 9 Score -         Physical Exam:  Constitutional: Well developed, well nourished female in no acute distress, sitting comfortably in the exam room chair. HEENT: Normocephalic and atraumatic. Oropharynx is clear, mucous membranes are moist.  Sclerae anicteric. Neck supple without JVD. No thyromegaly present. Lymph node   No palpable submandibular, cervical, supraclavicular, axillary lymph nodes. Skin Warm and dry. No bruising and no rash noted. No erythema. No pallor. Respiratory Lungs are clear to auscultation bilaterally without wheezes, rales or rhonchi, normal air exchange without accessory muscle use. CVS Normal rate, regular rhythm and normal S1 and S2. No murmurs, gallops, or rubs. Neuro Grossly nonfocal with no obvious sensory or motor deficits. MSK Normal range of motion in general.  No edema and no tenderness. Psych Appropriate mood and affect. Labs:  No visits with results within 3 Day(s) from this visit.    Latest known visit with results is:   Office Visit on 09/15/2022   Component Date Value Ref Range Status    Color (UA POC) 09/15/2022 Yellow   Final    Clarity (UA POC) 09/15/2022 Clear   Final    Glucose, Urine, POC 09/15/2022 Negative  Negative Final    Bilirubin, Urine, POC 09/15/2022 Negative  Negative Final    KETONES, Urine, POC 09/15/2022 Negative  Negative Final    Specific Gravity, Urine, POC 09/15/2022 1.030  1.001 - 1.035 Corrected    Blood (UA POC) 09/15/2022 Trace  Negative Final    pH, Urine, POC 09/15/2022 5.0  4.6 - 8.0 Corrected    Protein, Urine, POC 09/15/2022 Negative  Negative Final    Urobilinogen, POC 09/15/2022 0.2 mg/dL   Final    Nitrite, Urine, POC 09/15/2022 Negative  Negative Final    Leukocyte Esterase, Urine, POC 09/15/2022 Negative  Negative Corrected    Diagnosis: 09/15/2022 Comment    Final    Comment: (NOTE)  NEGATIVE FOR INTRAEPITHELIAL LESION OR MALIGNANCY. Specimen adequacy: 09/15/2022 Comment    Final    Comment: (NOTE)  Satisfactory for evaluation. Endocervical and/or squamous metaplastic  cells (endocervical component) are present. Performed by: 09/15/2022 Comment    Final    Comment: (NOTE)  Stephanie Newby, Cytotechnologist (ASCP)      Note: 09/15/2022 Comment    Final    Comment: (NOTE)  The Pap smear is a screening test designed to aid in the detection of  premalignant and malignant conditions of the uterine cervix. It is  not a diagnostic procedure and should not be used as the sole means  of detecting cervical cancer. Both false-positive and false-negative  reports do occur. Methodology: 09/15/2022 Comment    Final    Comment: (NOTE)  This liquid based ThinPrep(R) pap test was screened with the  use of an image guided system. HPV Aptima 09/15/2022 Negative  Negative   Final    Comment: (NOTE)  This nucleic acid amplification test detects fourteen high-risk  HPV types (16,18,31,33,35,39,45,51,52,56,58,59,66,68) without  differentiation. No. of containers. Say Jackson 01 ThinPrep Vial  Performed At: [facility] & [address] [id number]  Flora Casillas MD CERDA:[id number]  Performed At: [address] [place] [id number]  Flora Casillas MD WS:[id number]               Imaging:   BREAST MRI BEFORE AND AFTER CONTRAST       CLINICAL HISTORY:  81-year-old with recent needle biopsy diagnosis of a palpable   left infiltrating duct carcinoma; for preoperative evaluation. TECHNIQUE: Standard axial and sagittal images were obtained before and during   bolus injection of 14 cc of MultiHance IV.   CAD was employed. COMPARISON:  Multiple prior studies including the baseline bilateral mammography   of July 20, 2016 and left ultrasound biopsy of July 26, 2016. FINDINGS: There is moderate background parenchymal enhancement bilaterally. Nodular enhancement surrounding the left breast 8:30 biopsy marker clip has   maximal diameter of 2.3 cm by MRI. There is preservation of fat plane between   the mass and underlying pectoralis. There are very small, normal-appearing   intramammary lymph nodes bilaterally. No suspicious enhancing focus elsewhere   in either breast is distinguished from the background parenchymal enhancement. No pathologically enlarged or dysmorphic lymph nodes are seen. The liver,   lungs, and chest wall appear unremarkable as imaged. IMPRESSION:          1. SOLITARY LEFT BREAST 8:30 CARCINOMA HAS MAXIMAL DIAMETER OF 2.3 CM BY MRI   WITHOUT EVIDENCE OF MALIGNANCY ELSEWHERE IN EITHER BREAST OR METASTATIC DISEASE   IN THE IMAGED CHEST. 2.  MODERATE BACKGROUND PARENCHYMAL ENHANCEMENT LIMITS EVALUATION FOR POSSIBLE   SMALL MALIGNANCY, AND THEREFORE FOLLOW-UP MRI IS RECOMMENDED IN ONE YEAR.             Pathology:                                   ASSESSMENT:    ICD-10-CM    1. Malignant neoplasm of lower-inner quadrant of left female breast, unspecified estrogen receptor status ([address]  C50.312 MRI BREAST BILATERAL W WO CONTRAST                  ICD-10-CM  ICD-9-CM            1.  Malignant neoplasm of lower-inner quadrant of left breast in female, estrogen receptor negative (HCC)   C50.312  174.3  CBC WITH AUTOMATED DIFF           P77.6  R30.2  METABOLIC PANEL, COMPREHENSIVE       Patient Active Problem List   Diagnosis    Hypomagnesemia    Recurrent breast cancer, left (HCC)    Dehydration    Pain, generalized    Neutropenic fever ([address]    Post term pregnancy over 40 weeks    Encounter for IUD removal and reinsertion    S/P bilateral mastectomy    Malignant neoplasm of lower-inner quadrant of left female breast [facility])        PLAN:   Lab studies were personally reviewed. Breast cancer: 2.6 cm, g2, IDC, ER 2%, MI negative, HER2 negative. S/p bilateral mastectomy and L sentinel node, negative. [id number] (stage IIA). Her tumor, while only T2N0, is essentially triple negative, rendering endocrine therapy less effective. Therefore, I recommend that chemotherapy be administered in the adjuvant setting for risk reduction. I recommend the use of Adriamycin plus Cytoxan administered in a dose dense fashion every two weeks for four cycles, with growth factor support, followed by weekly paclitaxel. She will not require  radiation due to the N0 disease and mastectomy. We previously reviewed fertility preservation options and she wishes to proceed without intervention. BRCA testing negative. She completed ddAC-wP in March 2017 and tolerated treatment very well. Short  trial of Tamoxifen but discontinued due to headaches. March 2021, she noted a new nodule in the left breast, MRI imaging showed the mass to be 1.3 cm in size and was suspicious enough that biopsy was  recommended. Unfortunately, this confirms breast cancer, high grade, triple negative. MRI did not show any distant disease. We recommended surgical evaluation to discuss resection. Path reviewed  and showed the tumor to be 1.6 cm, 9 lymph nodes were negative ([id number]). Her updated genetics were negative. We discussed the role of adjuvant therapy, she cannot receive anthracyclines again, but  I would like to incorporate carboplatin given the triple negative histology and the desire for multiagent chemotherapy. I would lean toward Taxotere and carboplatin q3w for 6 cycles (TCH minus H). She went to [facility] 2nd opinion and was pleased with  the visit, they found a 1.6 cm \"low suspicion\" mass and performed FNA which was negative, CT and bone scan were also negative.   She met with medical oncology and was recommended to undergo adjuvant chemotherapy, they recommended carboplatin and gemcitabine but were OK with Taxotere and carboplatin. She was also recommended to undergo adjuvant radiation, we discussed that this would be after chemotherapy was completed. She completed 4 cycles of Taxotere/carboplatin before stopping therapy due to severe GI symptoms and profound fatigue. She was referred to radiation afterward and completed this in September 2021 with good tolerance. She returns for routine follow up. Overall, she has been well since last seen. Her main complaint is fatigue since chemotherapy and radiation, this is not severe enough to affect day to day activities but she does feel like it affects her quality of life. Appetite is good and weight is stable. Energy level is good. She denies any pain. She is having revision of her reconstruction next month for implant contracture s/p radiation. There are no new concerns or complaints. Labs reviewed from outside (done at her work) and are unremarkable. We will check several other labs for her fatigue although this may still be deconditioning related to her previous treatment. All questions were asked and answered to the best of my ability. F/u in 4 months for clinical recheck. Breast MRI due in March 2023.             Ivelisse Miles MD, MD  [facility] Hematology and Oncology  [address] [place] [address]  Office : [phone]  Fax : [phone]

## 2022-10-12 NOTE — PATIENT INSTRUCTIONS
Patient Instructions from Today's Visit    Reason for Visit:  Follow up - Breast    Diagnosis Information:  [email or web address]    Plan:  Breast MRI due in March of 2023. Will order additional labs for work up regarding fatigue. Please call us if you have any questions or concerns before your next visit. Follow Up: Follow up in March months with Dr. Veto Romero, with labs prior. Recent Lab Results:  No visits with results within 3 Day(s) from this visit. Latest known visit with results is:   Office Visit on 09/15/2022   Component Date Value Ref Range Status    Color (UA POC) 09/15/2022 Yellow   Final    Clarity (UA POC) 09/15/2022 Clear   Final    Glucose, Urine, POC 09/15/2022 Negative  Negative Final    Bilirubin, Urine, POC 09/15/2022 Negative  Negative Final    KETONES, Urine, POC 09/15/2022 Negative  Negative Final    Specific Gravity, Urine, POC 09/15/2022 1.030  1.001 - 1.035 Corrected    Blood (UA POC) 09/15/2022 Trace  Negative Final    pH, Urine, POC 09/15/2022 5.0  4.6 - 8.0 Corrected    Protein, Urine, POC 09/15/2022 Negative  Negative Final    Urobilinogen, POC 09/15/2022 0.2 mg/dL   Final    Nitrite, Urine, POC 09/15/2022 Negative  Negative Final    Leukocyte Esterase, Urine, POC 09/15/2022 Negative  Negative Corrected    Diagnosis: 09/15/2022 Comment    Final    Comment: (NOTE)  NEGATIVE FOR INTRAEPITHELIAL LESION OR MALIGNANCY. Specimen adequacy: 09/15/2022 Comment    Final    Comment: (NOTE)  Satisfactory for evaluation. Endocervical and/or squamous metaplastic  cells (endocervical component) are present. Performed by: 09/15/2022 Comment    Final    Comment: (NOTE)  Joseph Ramos Cytotechnologist ([facility])      Note: 09/15/2022 Comment    Final    Comment: (NOTE)  The Pap smear is a screening test designed to aid in the detection of  premalignant and malignant conditions of the uterine cervix.   It is  not a diagnostic procedure and should not be used as the sole means  of detecting cervical cancer. Both false-positive and false-negative  reports do occur. Methodology: 09/15/2022 Comment    Final    Comment: (NOTE)  This liquid based ThinPrep(R) pap test was screened with the  use of an image guided system. HPV Aptima 09/15/2022 Negative  Negative   Final    Comment: (NOTE)  This nucleic acid amplification test detects fourteen high-risk  HPV types (16,18,31,33,35,39,45,51,52,56,58,59,66,68) without  differentiation. No. of containers. Harden Beech 01 ThinPrep Vial  Performed At: [facility]  [facility] [id number]  Li Cook MD WP:[id number]  Performed At: [address] [place] [address] [id number]  Li Cook MD TX:[id number]           Treatment Summary has been discussed and given to patient: N/A        -------------------------------------------------------------------------------------------------------------------  Please call our office at [phone] if you have any  of the following symptoms:   Fever of 100.5 or greater  Chills  Shortness of breath  Swelling or pain in one leg    After office hours an answering service is available and will contact a provider for emergencies or if you are experiencing any of the above symptoms. Patient does express an interest in My Chart. My Chart log in information explained on the after visit summary printout at the [facility] Patrick Macdonald 90 desk.     Chandni Stewart RN

## 2022-11-10 NOTE — PERIOP NOTE
Patient verified name and . Order for consent IS NOT found in EHR and matches case posting; patient verifies procedure. Type 1b surgery, phone assessment complete. Orders not received. Labs per surgeon: none  Labs per anesthesia protocol: none    Patient answered medical/surgical history questions at their best of ability. All prior to admission medications documented in [facility]. Patient instructed to take the following medications the day of surgery according to anesthesia guidelines with a small sip of water: none   On the day before surgery please take Acetaminophen 1000mg in the morning and then again before bed. You may substitute for Tylenol 650 mg. Hold all vitamins 7 days prior to surgery and NSAIDS 5 days prior to surgery. Patient instructed on the following:    > Arrive at SUNDAY[facility], time of arrival to be called the day before by 1700  > NPO after midnight, unless otherwise indicated, including gum, mints, and ice chips  > Responsible adult must drive patient to the hospital, stay during surgery, and patient will need supervision 24 hours after anesthesia  > Use antibacterial soap in shower the night before surgery and on the morning of surgery  > All piercings must be removed prior to arrival.    > Leave all valuables (money and jewelry) at home but bring insurance card and ID on DOS.   > You may be required to pay a deductible or co-pay on the day of your procedure. You can pre-pay by calling [phone] if your surgery is at the [facility] or [phone] if your surgery is at the [facility]. > Do not wear make-up, nail polish, lotions, cologne, perfumes, powders, or oil on skin. Artificial nails are not permitted.

## 2022-11-11 ENCOUNTER — PREP FOR PROCEDURE (OUTPATIENT)
Dept: SURGERY | Age: 42
End: 2022-11-11

## 2022-11-11 RX ORDER — SODIUM CHLORIDE 0.9 % (FLUSH) 0.9 %
5-40 SYRINGE (ML) INJECTION EVERY 12 HOURS SCHEDULED
Status: CANCELLED | OUTPATIENT
Start: 2022-11-11

## 2022-11-11 RX ORDER — SODIUM CHLORIDE 9 MG/ML
INJECTION, SOLUTION INTRAVENOUS PRN
Status: CANCELLED | OUTPATIENT
Start: 2022-11-11

## 2022-11-11 RX ORDER — SODIUM CHLORIDE 0.9 % (FLUSH) 0.9 %
5-40 SYRINGE (ML) INJECTION PRN
Status: CANCELLED | OUTPATIENT
Start: 2022-11-11

## 2022-11-11 NOTE — H&P
Patient Information-  [facility]   : 1980   Scheduled Services for : Ruth Corbin   Appt Purpose: Breast Implant Exchange Appt Notes: *SFW* COLLECT $500   SURG    Referral Source: Doctor   Occupation: [employer]uchi: [employer] [place] [employer] [place] Shield Plan: [facility]   Surgery date: 2022 Breast Reconstruction - Other Technique - Left Dr Andrew Mclean R   Medications / Allergies  :  List any allergies: Allergy Reaction   1 Loratab Severe itching   2 zofran    List any current medications (Please include over-the-counter medications)      Drug Dosage   1. doxycycline 10mg   Non-steroidal anti-inflammatory drugs (ex. Motrin, Aleve)  Patient denies regular use of NSAID's. Do you take vitamins/minerals? No       Current Medications  Name Sig Start Date Discontinue Date   cefadroxil 500 mg capsule 1 capsule by mouth BID     cyclobenzaprine 10 mg tablet 1 tablet by mouth Q6-8h     None Add'l Sig Add'l Sig Add'l Sig Massage firmly to scar area 2-3 times daily for 5-10 minutes. External use only     Norco 325 mg-7.5 mg tablet Take tablets     oxycodone 5 mg tablet 1-2 tablet by mouth Q4-6h 2022    Roxicodone 5 mg tablet 1-2 tablet by mouth Q4-6h 3/24/2021    scopolamine 1.5 mg transdermal patch (1 mg over 3 days) 1 patch on the skin as directed Apply behind ear or under upper arm night before surgery. 2020    Transderm-Scop 1.5 mg transdermal patch (1 mg over 3 days) 1 Add'l Sig on the skin as directed     ZOFRAN ODT 8 mg disintegrating tablet 1 tablet by mouth as directed Let 1 tab dissolve on tongue AM of surgery     Height / Weight / BMI:  Height/Weight/BMI  Her height is 5ft 4in and weight is 170lbs. BMI is 29.18. Social / Family History:  ~MUS2 - Smoking Status  Never smoker. Social History  Patient denies using illegal drugs, denies high risk factors and admits alochol use socially.    Family History  Cancer Sister Thyroid   Diabetes Paternal Grandmother   High Blood Pressure Mother   Lung Disease Maternal Grandmother COPD  Vital Signs:  ~MUS2 - Blood Pressure  103/65   Vitals  Pulse 67 and RAPS02 99%   Pre-Operative Diagnosis:  Diagnosis - Text  Diagnosis: History of Left Breast Cancer, Capsular contracture   Planned Procedure:  Planned Procedure  Left Breast Capsulectomy with implant exchange   History of Present Illness  Past Medical / Surgical History:  Past Medical History  Breast Cancer <Details>  Review of Systems (check all current symptoms)  arthritis/joint disformity - no asthma - no chest pain - no heart attack - no high blood pressure - no inflamation of veins - no murmur - no pacemaker - no phlebitis - no convulsions - no epilepsy or seizures - no fainting - no fever or chills - no heart disease - no nausea, vomiting, diarrheah when taking antibiotics - no unexpected weight loss or weight gain - no chronic cough - no hearing loss *YES* morning cough - no sinus disorder - no diabetes - no excessive thirst/hunger - no frequent urination - no thyroid disorder - no abdominal pain - no chronic diarrhea or constipation - no gastro-intestinal problems - no irregular heartbeat - no stomach absorptive disorder - no ulcers - no bladder problems - no currently breast feeding - no currently pregnant - no frequency/burning during urination - no kidney problems - no yeast infection - no anemia - no bleed easily - no blood clots - no blood transfusion - no arthralgia - no artificial joints - no limited motion in joints - no muscle weakness - no paralysis - no stroke - no numbness or tingling - no headache - no migraines - no bronchitis - no emphysema - no shortness of breath - no tuberculosis - no wheezing - no psychiatric treatment - no recent crisis - no history of psychiatric hospitilization - no   Past Surgeries  Right Knee Surgery 11/2004   Uterine polyp removed 2010   Cyst removed from right foot 2013    2012   bilateral mastectomies with immediate breast reconstruction with tissues expanders   exchange tissue expanders for permanent implants  Anesthesia History  - No past anesthesia problems. Ethnic Origin  Diagnosis Codes   :  Diagnosis Codes  Breast   Diagnosis - Breast  History of CA, Personal   Pre-Operative Physical Exam:  HEENT  HEENT: Deferred. Cardiac Exam II  Cardiac Exam: WNL. Pulmonary Exam  Pulmonary Exam: WNL. Abdominal Exam  Abdominal Exam: Deferred. Extremity & Neuro Exam  Extremity & Neuro Exam: WNL. Pertinent System/Surgical Site Exam  Breast   Pre-Operative Checklist / Risks & Benefits:  Pre-op Checklist  Patient seen preoperatively. All patient questions answered. Risks and benefits (including alternative treatments) reviewed. Proper prescriptions given. Surgery time and date reviewed.   Complications both expected and unexpected discussed  Patient verbalized understanding of the outcome possibilites inherent with this procedure  Photographs taken  Pre-Op Risks and Benefits  bleeding, infection, scar, swelling, less than aesthetic result, bruising, need for additional surgery, hypertrophic scar, wound breakdown, asymmetry, numbness, contour irregularities, hematoma, seroma, under or over resection, DVT, partial skin flap loss, change in nipple sensitivity-temporary or permanent, implant failure, malposition of implant and possible need for drains   Surgical Risks Discussed  Pre-Operative Orders:  Pre-Operative Antibiotic Order Selection  Cefazolin 2GM IV piggyback (>80kg)   Planned Procedure  complex closure with possible implant exchange   Pre-Operative Orders  Nothing by mouth after midnight, SCD's per Anesthesia Guidelines, Pre-Operative Antibiotic Selection, Labs per Anesthesia, Heparin 5000u on call to Pre-op holding, Consent for: and Urine hCG day of surgery   Signature        Chapperone:  Chapperone 2  Signature - H&P - Pre-Op Orders:  Signature (Physician & Date) Pre-Op        Providers  Lizette Multani  Diagnosis Codes  Z85.3 - Personal history of malignant neoplasm of breast (V10.3)   Z92.3 - Personal history of irradiation (V15.3)

## 2022-11-16 ENCOUNTER — HOSPITAL ENCOUNTER (OUTPATIENT)
Age: 42
Setting detail: OUTPATIENT SURGERY
Discharge: HOME | End: 2022-11-16
Attending: PLASTIC SURGERY | Admitting: PLASTIC SURGERY
Payer: COMMERCIAL

## 2022-11-16 ENCOUNTER — ANESTHESIA EVENT (OUTPATIENT)
Dept: SURGERY | Age: 42
End: 2022-11-16
Payer: COMMERCIAL

## 2022-11-16 ENCOUNTER — ANESTHESIA (OUTPATIENT)
Dept: SURGERY | Age: 42
End: 2022-11-16
Payer: COMMERCIAL

## 2022-11-16 VITALS
DIASTOLIC BLOOD PRESSURE: 72 MMHG | TEMPERATURE: 96.7 F | RESPIRATION RATE: 13 BRPM | BODY MASS INDEX: 30.39 KG/M2 | WEIGHT: 178 LBS | HEART RATE: 68 BPM | OXYGEN SATURATION: 96 % | HEIGHT: 64 IN | SYSTOLIC BLOOD PRESSURE: 136 MMHG

## 2022-11-16 LAB — HCG UR QL: NEGATIVE

## 2022-11-16 PROCEDURE — 3600000004 HC SURGERY LEVEL 4 BASE: Performed by: PLASTIC SURGERY

## 2022-11-16 PROCEDURE — 7100000000 HC PACU RECOVERY - FIRST 15 MIN: Performed by: PLASTIC SURGERY

## 2022-11-16 PROCEDURE — 3700000001 HC ADD 15 MINUTES (ANESTHESIA): Performed by: PLASTIC SURGERY

## 2022-11-16 PROCEDURE — 6360000002 HC RX W HCPCS: Performed by: NURSE ANESTHETIST, CERTIFIED REGISTERED

## 2022-11-16 PROCEDURE — 7100000001 HC PACU RECOVERY - ADDTL 15 MIN: Performed by: PLASTIC SURGERY

## 2022-11-16 PROCEDURE — 6370000000 HC RX 637 (ALT 250 FOR IP): Performed by: ANESTHESIOLOGY

## 2022-11-16 PROCEDURE — 6360000002 HC RX W HCPCS: Performed by: STUDENT IN AN ORGANIZED HEALTH CARE EDUCATION/TRAINING PROGRAM

## 2022-11-16 PROCEDURE — 2580000003 HC RX 258: Performed by: ANESTHESIOLOGY

## 2022-11-16 PROCEDURE — 6360000002 HC RX W HCPCS: Performed by: ANESTHESIOLOGY

## 2022-11-16 PROCEDURE — 2580000003 HC RX 258: Performed by: NURSE ANESTHETIST, CERTIFIED REGISTERED

## 2022-11-16 PROCEDURE — C1789 PROSTHESIS, BREAST, IMP: HCPCS | Performed by: PLASTIC SURGERY

## 2022-11-16 PROCEDURE — 6360000002 HC RX W HCPCS: Performed by: PLASTIC SURGERY

## 2022-11-16 PROCEDURE — 7100000010 HC PHASE II RECOVERY - FIRST 15 MIN: Performed by: PLASTIC SURGERY

## 2022-11-16 PROCEDURE — 3600000014 HC SURGERY LEVEL 4 ADDTL 15MIN: Performed by: PLASTIC SURGERY

## 2022-11-16 PROCEDURE — 2709999900 HC NON-CHARGEABLE SUPPLY: Performed by: PLASTIC SURGERY

## 2022-11-16 PROCEDURE — 3700000000 HC ANESTHESIA ATTENDED CARE: Performed by: PLASTIC SURGERY

## 2022-11-16 PROCEDURE — 2720000010 HC SURG SUPPLY STERILE: Performed by: PLASTIC SURGERY

## 2022-11-16 PROCEDURE — 2500000003 HC RX 250 WO HCPCS: Performed by: PLASTIC SURGERY

## 2022-11-16 PROCEDURE — 81025 URINE PREGNANCY TEST: CPT

## 2022-11-16 PROCEDURE — 7100000011 HC PHASE II RECOVERY - ADDTL 15 MIN: Performed by: PLASTIC SURGERY

## 2022-11-16 PROCEDURE — 2500000003 HC RX 250 WO HCPCS: Performed by: NURSE ANESTHETIST, CERTIFIED REGISTERED

## 2022-11-16 RX ORDER — MIDAZOLAM HYDROCHLORIDE 2 MG/2ML
2 INJECTION, SOLUTION INTRAMUSCULAR; INTRAVENOUS
Status: COMPLETED | OUTPATIENT
Start: 2022-11-16 | End: 2022-11-16

## 2022-11-16 RX ORDER — SODIUM CHLORIDE 0.9 % (FLUSH) 0.9 %
5-40 SYRINGE (ML) INJECTION PRN
Status: DISCONTINUED | OUTPATIENT
Start: 2022-11-16 | End: 2022-11-16 | Stop reason: HOSPADM

## 2022-11-16 RX ORDER — SODIUM CHLORIDE 9 MG/ML
INJECTION, SOLUTION INTRAVENOUS PRN
Status: DISCONTINUED | OUTPATIENT
Start: 2022-11-16 | End: 2022-11-16 | Stop reason: HOSPADM

## 2022-11-16 RX ORDER — FENTANYL CITRATE 50 UG/ML
INJECTION, SOLUTION INTRAMUSCULAR; INTRAVENOUS PRN
Status: DISCONTINUED | OUTPATIENT
Start: 2022-11-16 | End: 2022-11-16 | Stop reason: SDUPTHER

## 2022-11-16 RX ORDER — BUPIVACAINE HYDROCHLORIDE 5 MG/ML
INJECTION, SOLUTION PERINEURAL PRN
Status: DISCONTINUED | OUTPATIENT
Start: 2022-11-16 | End: 2022-11-16 | Stop reason: HOSPADM

## 2022-11-16 RX ORDER — SODIUM CHLORIDE 0.9 % (FLUSH) 0.9 %
5-40 SYRINGE (ML) INJECTION EVERY 12 HOURS SCHEDULED
Status: DISCONTINUED | OUTPATIENT
Start: 2022-11-16 | End: 2022-11-16 | Stop reason: HOSPADM

## 2022-11-16 RX ORDER — OXYCODONE HYDROCHLORIDE 5 MG/1
5 TABLET ORAL
Status: COMPLETED | OUTPATIENT
Start: 2022-11-16 | End: 2022-11-16

## 2022-11-16 RX ORDER — VANCOMYCIN HYDROCHLORIDE 1 G/20ML
INJECTION, POWDER, LYOPHILIZED, FOR SOLUTION INTRAVENOUS PRN
Status: DISCONTINUED | OUTPATIENT
Start: 2022-11-16 | End: 2022-11-16 | Stop reason: HOSPADM

## 2022-11-16 RX ORDER — SODIUM CHLORIDE, SODIUM LACTATE, POTASSIUM CHLORIDE, CALCIUM CHLORIDE 600; 310; 30; 20 MG/100ML; MG/100ML; MG/100ML; MG/100ML
INJECTION, SOLUTION INTRAVENOUS CONTINUOUS
Status: DISCONTINUED | OUTPATIENT
Start: 2022-11-16 | End: 2022-11-16 | Stop reason: HOSPADM

## 2022-11-16 RX ORDER — MEPERIDINE HYDROCHLORIDE 50 MG/ML
12.5 INJECTION INTRAMUSCULAR; INTRAVENOUS; SUBCUTANEOUS EVERY 5 MIN PRN
Status: DISCONTINUED | OUTPATIENT
Start: 2022-11-16 | End: 2022-11-16 | Stop reason: HOSPADM

## 2022-11-16 RX ORDER — LIDOCAINE HYDROCHLORIDE 10 MG/ML
1 INJECTION, SOLUTION INFILTRATION; PERINEURAL
Status: DISCONTINUED | OUTPATIENT
Start: 2022-11-16 | End: 2022-11-16 | Stop reason: HOSPADM

## 2022-11-16 RX ORDER — SODIUM CHLORIDE, SODIUM LACTATE, POTASSIUM CHLORIDE, CALCIUM CHLORIDE 600; 310; 30; 20 MG/100ML; MG/100ML; MG/100ML; MG/100ML
INJECTION, SOLUTION INTRAVENOUS CONTINUOUS PRN
Status: DISCONTINUED | OUTPATIENT
Start: 2022-11-16 | End: 2022-11-16 | Stop reason: SDUPTHER

## 2022-11-16 RX ORDER — ACETAMINOPHEN 500 MG
1000 TABLET ORAL ONCE
Status: COMPLETED | OUTPATIENT
Start: 2022-11-16 | End: 2022-11-16

## 2022-11-16 RX ORDER — HYDROMORPHONE HYDROCHLORIDE 1 MG/ML
0.5 INJECTION, SOLUTION INTRAMUSCULAR; INTRAVENOUS; SUBCUTANEOUS EVERY 5 MIN PRN
Status: DISCONTINUED | OUTPATIENT
Start: 2022-11-16 | End: 2022-11-16 | Stop reason: HOSPADM

## 2022-11-16 RX ORDER — LIDOCAINE HYDROCHLORIDE 20 MG/ML
INJECTION, SOLUTION EPIDURAL; INFILTRATION; INTRACAUDAL; PERINEURAL PRN
Status: DISCONTINUED | OUTPATIENT
Start: 2022-11-16 | End: 2022-11-16 | Stop reason: SDUPTHER

## 2022-11-16 RX ORDER — DEXAMETHASONE SODIUM PHOSPHATE 10 MG/ML
INJECTION INTRAMUSCULAR; INTRAVENOUS PRN
Status: DISCONTINUED | OUTPATIENT
Start: 2022-11-16 | End: 2022-11-16 | Stop reason: SDUPTHER

## 2022-11-16 RX ORDER — KETOROLAC TROMETHAMINE 30 MG/ML
INJECTION, SOLUTION INTRAMUSCULAR; INTRAVENOUS PRN
Status: DISCONTINUED | OUTPATIENT
Start: 2022-11-16 | End: 2022-11-16 | Stop reason: SDUPTHER

## 2022-11-16 RX ORDER — PROPOFOL 10 MG/ML
INJECTION, EMULSION INTRAVENOUS PRN
Status: DISCONTINUED | OUTPATIENT
Start: 2022-11-16 | End: 2022-11-16 | Stop reason: SDUPTHER

## 2022-11-16 RX ORDER — PROCHLORPERAZINE EDISYLATE 5 MG/ML
5 INJECTION INTRAMUSCULAR; INTRAVENOUS
Status: DISCONTINUED | OUTPATIENT
Start: 2022-11-16 | End: 2022-11-16 | Stop reason: HOSPADM

## 2022-11-16 RX ORDER — APREPITANT 40 MG/1
40 CAPSULE ORAL ONCE
Status: COMPLETED | OUTPATIENT
Start: 2022-11-16 | End: 2022-11-16

## 2022-11-16 RX ADMIN — LIDOCAINE HYDROCHLORIDE 60 MG: 20 INJECTION, SOLUTION EPIDURAL; INFILTRATION; INTRACAUDAL; PERINEURAL at 10:19

## 2022-11-16 RX ADMIN — FENTANYL CITRATE 100 MCG: 50 INJECTION, SOLUTION INTRAMUSCULAR; INTRAVENOUS at 10:15

## 2022-11-16 RX ADMIN — HYDROMORPHONE HYDROCHLORIDE 0.5 MG: 1 INJECTION, SOLUTION INTRAMUSCULAR; INTRAVENOUS; SUBCUTANEOUS at 11:55

## 2022-11-16 RX ADMIN — Medication 2 G: at 10:14

## 2022-11-16 RX ADMIN — SODIUM CHLORIDE, SODIUM LACTATE, POTASSIUM CHLORIDE, AND CALCIUM CHLORIDE: 600; 310; 30; 20 INJECTION, SOLUTION INTRAVENOUS at 10:14

## 2022-11-16 RX ADMIN — KETOROLAC TROMETHAMINE 30 MG: 30 INJECTION, SOLUTION INTRAMUSCULAR; INTRAVENOUS at 11:20

## 2022-11-16 RX ADMIN — APREPITANT 40 MG: 40 CAPSULE ORAL at 09:03

## 2022-11-16 RX ADMIN — PROPOFOL 200 MG: 10 INJECTION, EMULSION INTRAVENOUS at 10:19

## 2022-11-16 RX ADMIN — ACETAMINOPHEN 1000 MG: 500 TABLET, FILM COATED ORAL at 08:43

## 2022-11-16 RX ADMIN — MIDAZOLAM 2 MG: 1 INJECTION INTRAMUSCULAR; INTRAVENOUS at 09:37

## 2022-11-16 RX ADMIN — OXYCODONE 5 MG: 5 TABLET ORAL at 11:51

## 2022-11-16 RX ADMIN — HYDROMORPHONE HYDROCHLORIDE 0.5 MG: 1 INJECTION, SOLUTION INTRAMUSCULAR; INTRAVENOUS; SUBCUTANEOUS at 11:50

## 2022-11-16 RX ADMIN — DEXAMETHASONE SODIUM PHOSPHATE 10 MG: 10 INJECTION INTRAMUSCULAR; INTRAVENOUS at 10:35

## 2022-11-16 RX ADMIN — SODIUM CHLORIDE, SODIUM LACTATE, POTASSIUM CHLORIDE, AND CALCIUM CHLORIDE: 600; 310; 30; 20 INJECTION, SOLUTION INTRAVENOUS at 10:35

## 2022-11-16 RX ADMIN — SODIUM CHLORIDE, POTASSIUM CHLORIDE, SODIUM LACTATE AND CALCIUM CHLORIDE: 600; 310; 30; 20 INJECTION, SOLUTION INTRAVENOUS at 09:08

## 2022-11-16 ASSESSMENT — PAIN DESCRIPTION - ORIENTATION
ORIENTATION: RIGHT;LEFT

## 2022-11-16 ASSESSMENT — PAIN DESCRIPTION - LOCATION
LOCATION: BREAST

## 2022-11-16 ASSESSMENT — PAIN DESCRIPTION - DESCRIPTORS
DESCRIPTORS: ACHING

## 2022-11-16 ASSESSMENT — PAIN - FUNCTIONAL ASSESSMENT: PAIN_FUNCTIONAL_ASSESSMENT: 0-10

## 2022-11-16 ASSESSMENT — PAIN SCALES - GENERAL
PAINLEVEL_OUTOF10: 3
PAINLEVEL_OUTOF10: 8
PAINLEVEL_OUTOF10: 3
PAINLEVEL_OUTOF10: 6
PAINLEVEL_OUTOF10: 5
PAINLEVEL_OUTOF10: 3

## 2022-11-16 NOTE — ANESTHESIA POSTPROCEDURE EVALUATION
Department of Anesthesiology  Postprocedure Note    Patient: Alena Claude  MRN: [id number]  YOB: 1980  Date of evaluation: 11/16/2022      Procedure Summary     Date: 11/16/22 Room / Location: [facility] MAIN OR 02 / [facility] MAIN OR    Anesthesia Start: 1014 Anesthesia Stop: 1147    Procedure: LEFT BREAST CAPSULECTOMY WITH IMPLANT EXCHANGE (Left: Breast) Diagnosis:       Personal history of malignant neoplasm of breast      Capsular contracture of breast implant, initial encounter      Deformity of reconstructed breast      (Personal history of malignant neoplasm of breast [Z85.3])      (Capsular contracture of breast implant, initial encounter [T85.44XA])      (Deformity of reconstructed breast [N65.0])    Surgeons: Cesar Gusman MD Responsible Provider: Ney Levin MD    Anesthesia Type: general ASA Status: 2          Anesthesia Type: No value filed.     Bashir Phase I: Bashir Score: 10    Bashir Phase II: Bashir Score: 10      Anesthesia Post Evaluation    Patient location during evaluation: bedside  Patient participation: complete - patient participated  Level of consciousness: awake and alert  Pain score: 1  Airway patency: patent  Nausea & Vomiting: no vomiting  Complications: no  Cardiovascular status: hemodynamically stable  Respiratory status: acceptable  Hydration status: euvolemic

## 2022-11-16 NOTE — BRIEF OP NOTE
Brief Postoperative Note      Patient: Ren Hampton  YOB: 1980  MRN: [id number]    Date of Procedure: 11/16/2022    Pre-Op Diagnosis: Personal history of malignant neoplasm of breast [Z85.3]  Capsular contracture of breast implant, initial encounter [T85.44XA]  Deformity of reconstructed breast [N65.0]    Post-Op Diagnosis: Same       Procedure(s):  LEFT BREAST CAPSULECTOMY WITH IMPLANT EXCHANGE    Surgeon(s):  Marilee Mccauley MD    Assistant:  * No surgical staff found *    Anesthesia: General    Estimated Blood Loss (mL): less than 50     Complications: None    Specimens:   * No specimens in log *    Implants:  Implant Name Type Inv.  Item Serial No.  Lot No. LRB No. Used Action   IMPLANT BRST 455CC DIA11.2CM P5.7CM JAUN GEL SMOOTH RND HI - [id number]  IMPLANT BRST 455CC DIA11.2CM P5.7CM JAUN [place] S[place] RND [place] [id number] [facility]- [id number] Left 1 Implanted         Drains:   Closed/Suction Drain Left Breast (Active)       Findings: none    Electronically signed by Reji Maxwell MD on 11/16/2022 at 11:25 AM

## 2022-11-16 NOTE — ANESTHESIA PRE PROCEDURE
Department of Anesthesiology  Preprocedure Note       Name:  Abdias Donnelly   Age:  38 y.o.  :  1980                                          MRN:  [id number]         Date:  2022      Surgeon: Javan Scruggs):  Meño Villar MD    Procedure: Procedure(s):  LEFT BREAST CAPSULECTOMY WITH IMPLANT EXCHANGE    Medications prior to admission:   Prior to Admission medications    Medication Sig Start Date End Date Taking? Authorizing Provider   levonorgestrel (MIRENA, 52 MG,) IUD 52 mg 1 each by IntraUTERine route once Indications: Inserted     Historical Provider, MD       Current medications:    Current Facility-Administered Medications   Medication Dose Route Frequency Provider Last Rate Last Admin    lidocaine 1 % injection 1 mL  1 mL IntraDERmal Once PRN Kamini Morgan MD        lactated ringers infusion   IntraVENous Continuous Kamini Morgan MD        sodium chloride flush 0.9 % injection 5-40 mL  5-40 mL IntraVENous 2 times per day Kamini Morgan MD        sodium chloride flush 0.9 % injection 5-40 mL  5-40 mL IntraVENous PRN Kamini Morgan MD        0.9 % sodium chloride infusion   IntraVENous PRN Kamini Morgan MD        midazolam PF (VERSED) injection 2 mg  2 mg IntraVENous Once PRN Kamini Morgan MD        sodium chloride flush 0.9 % injection 5-40 mL  5-40 mL IntraVENous 2 times per day Meño Villar MD        sodium chloride flush 0.9 % injection 5-40 mL  5-40 mL IntraVENous PRN Meño Villar MD        0.9 % sodium chloride infusion   IntraVENous PRN Meño Villar MD        ceFAZolin (ANCEF) 2000 mg in sterile water 20 mL IV syringe  2,000 mg IntraVENous Once Meño Villar MD        aprepitant (EMEND) capsule 40 mg  40 mg Oral Once Gilford Boroughs, MD           Allergies:     Allergies   Allergen Reactions    Guaifenesin Hives     Other reaction(s): Unknown    Hydrocodone-Acetaminophen Itching     Other reaction(s): Unknown    Ondansetron Other (See Comments)     \"severe migraines\"    Ondansetron Hcl Other (See Comments)       Problem List:    Patient Active Problem List   Diagnosis Code    Hypomagnesemia E83.42    Recurrent breast cancer, left ([facility] C50.912    Dehydration E86.0    Pain, generalized R52    Neutropenic fever ([facility] D70.9, R50.81    Post term pregnancy over 40 weeks O48.0    Encounter for IUD removal and reinsertion Z30.433    S/P bilateral mastectomy Z90.13    Malignant neoplasm of lower-inner quadrant of left female breast ([facility] C50.312       Past Medical History:        Diagnosis Date    Breast cancer ([facility]     x 2    Cancer ([facility] dx--7/28/16    left breast cancer--- surgery and chemo     MRSA infection 2014    LEFT EAR       Past Surgical History:        Procedure Laterality Date    BREAST BIOPSY Left 03/02/2021    BREAST LUMPECTOMY Left 3/31/2021    LEFT BREAST LUMPECTOMY performed by Bard Teresa MD at [facility] Left 3/31/2021    LEFT BREAST COMPLEX CLOSURE performed by Thuy Singh MD at [facility] Bilateral 8/30/2016    BREAST TISSUE EXPANDER INSERTION AND NIPPLE SPARING/ BILATERAL  performed by Yvan Watson MD [facility] Bilateral 4/5/2017    BILATERAL BREAST TISSUE EXPANDER REMOVAL /PLACEMENT OF PERMANENT IMPLANTS performed by Yvan Watson MD at [facility] Bilateral 7/6/2020    BILATERAL REVISION OF BREAST RECONSTRUCTION/ performed by Thuy Singh MD at [address] [place] [address]  2012    x 1     IR PORT PLACEMENT EQUAL OR GREATER THAN 5 YEARS  4/15/2021    IR PORT PLACEMENT EQUAL OR GREATER THAN 5 YEARS  4/15/2021    IR PORT PLACEMENT EQUAL OR GREATER THAN 5 YEARS 4/15/2021 [facility] SPECI[facility]    IR REMOVE TUNNELED VAD W PORT  8/5/2021    KNEE ARTHROSCOPY Right 2004    MASTECTOMY Bilateral 8/30/2016 BREAST MASTECTOMY SIMPLE BILATERAL performed by Juliana Hernández MD at [facility] Right 2013    cyst removed from foot    ORTHOPEDIC SURGERY Right 08/15/2019    RIGHT [address] (Right Foot)    OTHER SURGICAL HISTORY  01/14/2019    RIGHT FOOT EXCISION SOFT TISSUE MASS (Right Foot)     US BREAST NEEDLE BIOPSY LEFT Left 3/2/2021    US BREAST NEEDLE BIOPSY LEFT 3/2/2021 [facility]    VASCULAR SURGERY  2016    life port, and removed    WISDOM TOOTH EXTRACTION         Social History:    Social History     Tobacco Use    Smoking status: Never    Smokeless tobacco: Never   Substance Use Topics    Alcohol use: Not Currently                                Counseling given: Not Answered      Vital Signs (Current):   Vitals:    11/10/22 1047 11/16/22 0830   BP:  112/77   Pulse:  71   Resp:  18   Temp:  97.8 °F (36.6 °C)   TempSrc:  Temporal   SpO2:  100%   Weight: 175 lb (79.4 kg) 178 lb (80.7 kg)   Height: 5' 4\" (1.626 m) 5' 4\" (1.626 m)                                              BP Readings from Last 3 Encounters:   11/16/22 112/77   10/12/22 107/67   09/15/22 118/68       NPO Status: Time of last liquid consumption: 2300                        Time of last solid consumption: 2300                        Date of last liquid consumption: 11/15/22                        Date of last solid food consumption: 11/15/22    BMI:   Wt Readings from Last 3 Encounters:   11/16/22 178 lb (80.7 kg)   10/12/22 181 lb (82.1 kg)   09/15/22 180 lb (81.6 kg)     Body mass index is 30.55 kg/m².     CBC:   Lab Results   Component Value Date/Time    WBC 5.2 06/08/2022 08:54 AM    RBC 4.32 06/08/2022 08:54 AM    HGB 13.5 06/08/2022 08:54 AM    HCT 40.2 06/08/2022 08:54 AM    MCV 93.1 06/08/2022 08:54 AM    RDW 12.5 06/08/2022 08:54 AM     06/08/2022 08:54 AM       CMP:   Lab Results   Component Value Date/Time     06/08/2022 08:54 AM    K 3.9 06/08/2022 08:54 AM     06/08/2022 08:54 AM    CO2 26 06/08/2022 08:54 AM    BUN 13 06/08/2022 08:54 AM    CREATININE 0.80 06/08/2022 08:54 AM    GFRAA >60 06/08/2022 08:54 AM    AGRATIO 1.3 07/28/2021 07:50 AM    LABGLOM >60 06/08/2022 08:54 AM    GLUCOSE 80 06/08/2022 08:54 AM    PROT 7.0 06/08/2022 08:54 AM    CALCIUM 9.0 06/08/2022 08:54 AM    BILITOT 1.3 06/08/2022 08:54 AM    ALKPHOS 92 06/08/2022 08:54 AM    ALKPHOS 64 07/28/2021 07:50 AM    AST 12 06/08/2022 08:54 AM    ALT 20 06/08/2022 08:54 AM       POC Tests: No results for input(s): POCGLU, POCNA, POCK, POCCL, POCBUN, POCHEMO, POCHCT in the last 72 hours. Coags: No results found for: PROTIME, INR, APTT    HCG (If Applicable):   Lab Results   Component Value Date    PREGTESTUR Negative 11/16/2022        ABGs: No results found for: PHART, PO2ART, MZH5ANF, RIS2ZGM, BEART, A2MZYFZE     Type & Screen (If Applicable):  No results found for: LABABO, LABRH    Drug/Infectious Status (If Applicable):  No results found for: HIV, HEPCAB    COVID-19 Screening (If Applicable):   Lab Results   Component Value Date/Time    COVID19 Not Detected 03/24/2021 01:08 PM    COVID19 Performed 03/24/2021 01:08 PM           Anesthesia Evaluation  Patient summary reviewed and Nursing notes reviewed no history of anesthetic complications:   Airway: Mallampati: I  TM distance: >3 FB   Neck ROM: full  Mouth opening: > = 3 FB   Dental: normal exam         Pulmonary:Negative Pulmonary ROS and normal exam                               Cardiovascular:Negative CV ROS  Exercise tolerance: good (>4 METS),                     Neuro/Psych:   Negative Neuro/Psych ROS              GI/Hepatic/Renal: Neg GI/Hepatic/Renal ROS            Endo/Other:    (+) malignancy/cancer (breast). Abdominal:             Vascular: negative vascular ROS. Other Findings:           Anesthesia Plan      general     ASA 2     (Zofran allergy. Emend preop. )  Induction: intravenous.       Anesthetic plan and risks discussed with patient and spouse.                         Mario Walker MD   11/16/2022

## 2022-11-16 NOTE — DISCHARGE INSTRUCTIONS
Leave dressing in place for 48 hours, then may sponge bathe. Apply bacitracin and gauze daily. Strip and record drains BID. MEDICATION INTERACTION:    During your procedure you potentially received a medication or medications which may reduce the effectiveness of oral contraceptives. Please consider other forms of contraception for 1 month following your procedure if you are currently using oral contraceptives as your primary form of birth control. In addition to this, we recommend continuing your oral contraceptive as prescribed, unless otherwise instructed by your physician, during this time. After general anesthesia or intravenous sedation, for 24 hours or while taking prescription Narcotics:  Limit your activities  A responsible adult needs to be with you for the next 24 hours  Do not drive and operate hazardous machinery  Do not make important personal or business decisions  Do not drink alcoholic beverages  If you have not urinated within 8 hours after discharge, and you are experiencing discomfort from urinary retention, please go to the nearest ED. If you have sleep apnea and have a CPAP machine, please use it for all naps and sleeping. Please use caution when taking narcotics and any of your home medications that may cause drowsiness. *  Please give a list of your current medications to your Primary Care Provider. *  Please update this list whenever your medications are discontinued, doses are      changed, or new medications (including over-the-counter products) are added. *  Please carry medication information at all times in case of emergency situations. These are general instructions for a healthy lifestyle:  No smoking/ No tobacco products/ Avoid exposure to second hand smoke  Surgeon General's Warning:  Quitting smoking now greatly reduces serious risk to your health.   Obesity, smoking, and sedentary lifestyle greatly increases your risk for illness  A healthy diet, regular physical exercise & weight monitoring are important for maintaining a healthy lifestyle    You may be retaining fluid if you have a history of heart failure or if you experience any of the following symptoms:  Weight gain of 3 pounds or more overnight or 5 pounds in a week, increased swelling in our hands or feet or shortness of breath while lying flat in bed. Please call your doctor as soon as you notice any of these symptoms; do not wait until your next office visit.

## 2022-11-18 NOTE — OP NOTE
[place]  OPERATIVE REPORT    Name:  Christine Alarcon  MR#:  [id number]  :  1980  ACCOUNT #:  [de-identified]  DATE OF SERVICE:  2022    PREOPERATIVE DIAGNOSIS:  Left breast capsular contracture with history of radiation and breast cancer. POSTOPERATIVE DIAGNOSIS:  Left breast capsular contracture with history of radiation and breast cancer. PROCEDURE PERFORMED:  Left breast capsulectomy with implant exchange. SURGEON:  Roxana Wyatt MD    ASSISTANT: Melinda Penaloza CST    ANESTHESIA:  General.    COMPLICATIONS:  None. SPECIMENS REMOVED:  None. IMPLANTS: as below    ESTIMATED BLOOD LOSS:  Minimal.    INDICATIONS:  The patient presents with a history of breast cancer and an implant-based reconstruction. She also had postoperative radiation on the left breast and has developed a significant and painful capsular contracture on that side. PROCEDURE:  After informed consent was obtained from the patient, she was marked in the holding area in the upright position. She was then taken to the operating room, placed in the supine position, and prepped and draped in the usual fashion. I opened a portion of her previous periareolar nipple-sparing incision with 15 blade followed by Bovie cautery. The capsule was then exposed and a prefascial dissection was carried out over the anterior surface of the breast.  Next then I opened the capsule and removed the device which was an intact 480 mL ultra high-profile smooth round gel implant from [place]. I removed the remaining portions of the capsule. There was a small portion left adherent to the rib cage for safety reasons. Next then the pocket was explored and irrigated with antibiotic irrigation. A drain was also placed and secured with a 4-0 nylon stitch and then I tried a couple of sizers both previous 480 implant and then also a 455 which seemed to fit the pocket better and have better symmetry.   So then the pocket was prepared with antibiotic irrigation and re-prepped with Betadine. A 455 mL smooth round ultra-high profile gel implant from [place] was selected. This was soaked in antibiotic irrigation, placed in the Fillistorf funnel and into the subpectoral pocket. Next then the patient was closed in three layers with 3-0 Vicryl in the muscle layer, another layer in the subdermal layer with 4-0 Vicryl and then a running 5-0 Monocryl subcuticular stitch. The patient tolerated the procedure very well. She was dressed with Xeroform, bacitracin, gauze, ABD pads and a surgical bra and escorted to recovery room in stable condition.       Reji Maxwell MD      [id number]  D:  11/18/2022 9:34  T:  11/18/2022 12:59  JOB #:  [id number]

## 2023-02-01 ENCOUNTER — TELEPHONE (OUTPATIENT)
Dept: ONCOLOGY | Age: 43
End: 2023-02-01

## 2023-02-01 NOTE — TELEPHONE ENCOUNTER
Pt state the Chemo she was on may have affected her harmones levels & want to verify if she could have labs to verify.

## 2023-02-01 NOTE — TELEPHONE ENCOUNTER
Fatigue and night sweats and she has weight gain and swelling in her feet and ankles. Msg to NP Pool. Per NP Fely Macias this would be best done with GYN or PCP.    Call to the patient and she Verbalizes understanding of instructions

## 2023-02-14 ENCOUNTER — HOSPITAL ENCOUNTER (OUTPATIENT)
Dept: NON INVASIVE DIAGNOSTICS | Age: 43
Discharge: HOME | End: 2023-02-16
Payer: COMMERCIAL

## 2023-02-14 DIAGNOSIS — R60.9 EDEMA, UNSPECIFIED TYPE: ICD-10-CM

## 2023-02-14 LAB
ECHO AO ASC DIAM: 2.6 CM
ECHO AO ROOT DIAM: 2.6 CM
ECHO AV AREA PEAK VELOCITY: 2.3 CM2
ECHO AV AREA VTI: 2.3 CM2
ECHO AV MEAN GRADIENT: 4 MMHG
ECHO AV MEAN VELOCITY: 0.9 M/S
ECHO AV PEAK GRADIENT: 7 MMHG
ECHO AV PEAK VELOCITY: 1.4 M/S
ECHO AV VELOCITY RATIO: 0.79
ECHO AV VTI: 26.8 CM
ECHO EST RA PRESSURE: 3 MMHG
ECHO IVC PROX: 1.5 CM
ECHO LA AREA 2C: 12 CM2
ECHO LA AREA 4C: 12.1 CM2
ECHO LA DIAMETER: 3.4 CM
ECHO LA MAJOR AXIS: 4.7 CM
ECHO LA MINOR AXIS: 4.5 CM
ECHO LA TO AORTIC ROOT RATIO: 1.31
ECHO LA VOL 2C: 26 ML (ref 22–52)
ECHO LA VOL 4C: 25 ML (ref 22–52)
ECHO LA VOL BP: 26 ML (ref 22–52)
ECHO LV E' LATERAL VELOCITY: 12 CM/S
ECHO LV E' SEPTAL VELOCITY: 11 CM/S
ECHO LV EDV A2C: 83 ML
ECHO LV EDV A4C: 64 ML
ECHO LV EJECTION FRACTION A2C: 60 %
ECHO LV EJECTION FRACTION A4C: 61 %
ECHO LV EJECTION FRACTION BIPLANE: 61 % (ref 55–100)
ECHO LV ESV A2C: 33 ML
ECHO LV ESV A4C: 25 ML
ECHO LV FRACTIONAL SHORTENING: 28 % (ref 28–44)
ECHO LV INTERNAL DIMENSION DIASTOLIC: 4.6 CM (ref 3.9–5.3)
ECHO LV INTERNAL DIMENSION SYSTOLIC: 3.3 CM
ECHO LV IVSD: 0.8 CM (ref 0.6–0.9)
ECHO LV MASS 2D: 108.5 G (ref 67–162)
ECHO LV POSTERIOR WALL DIASTOLIC: 0.7 CM (ref 0.6–0.9)
ECHO LV RELATIVE WALL THICKNESS RATIO: 0.3
ECHO LVOT AREA: 2.8 CM2
ECHO LVOT AV VTI INDEX: 0.81
ECHO LVOT DIAM: 1.9 CM
ECHO LVOT MEAN GRADIENT: 3 MMHG
ECHO LVOT PEAK GRADIENT: 5 MMHG
ECHO LVOT PEAK VELOCITY: 1.1 M/S
ECHO LVOT SV: 61.5 ML
ECHO LVOT VTI: 21.7 CM
ECHO MV A VELOCITY: 0.57 M/S
ECHO MV E DECELERATION TIME (DT): 203 MS
ECHO MV E VELOCITY: 0.89 M/S
ECHO MV E/A RATIO: 1.56
ECHO MV E/E' LATERAL: 7.42
ECHO MV E/E' RATIO (AVERAGED): 7.75
ECHO MV E/E' SEPTAL: 8.09
ECHO PV ACCELERATION TIME (AT): 158 MS
ECHO PV MAX VELOCITY: 1 M/S
ECHO PV PEAK GRADIENT: 4 MMHG
ECHO RIGHT VENTRICULAR SYSTOLIC PRESSURE (RVSP): 19 MMHG
ECHO RV BASAL DIMENSION: 3.1 CM
ECHO RV FREE WALL PEAK S': 10 CM/S
ECHO RV TAPSE: 1.6 CM (ref 1.7–?)
ECHO TV REGURGITANT MAX VELOCITY: 1.97 M/S
ECHO TV REGURGITANT PEAK GRADIENT: 16 MMHG
LV EF: 63 %
LVEF MODALITY: ABNORMAL

## 2023-02-14 PROCEDURE — 93306 TTE W/DOPPLER COMPLETE: CPT | Performed by: INTERNAL MEDICINE

## 2023-02-14 PROCEDURE — 93306 TTE W/DOPPLER COMPLETE: CPT

## 2023-03-06 DIAGNOSIS — C50.312 MALIGNANT NEOPLASM OF LOWER-INNER QUADRANT OF LEFT FEMALE BREAST, UNSPECIFIED ESTROGEN RECEPTOR STATUS (HCC): Primary | ICD-10-CM

## 2023-03-07 RX ORDER — LORAZEPAM 1 MG/1
1 TABLET ORAL PRN
Qty: 2 TABLET | Refills: 0 | Status: SHIPPED | OUTPATIENT
Start: 2023-03-17 | End: 2023-04-06

## 2023-03-10 ENCOUNTER — HOSPITAL ENCOUNTER (OUTPATIENT)
Dept: MRI IMAGING | Age: 43
Discharge: HOME | End: 2023-03-10
Payer: COMMERCIAL

## 2023-03-10 DIAGNOSIS — C50.312 MALIGNANT NEOPLASM OF LOWER-INNER QUADRANT OF LEFT FEMALE BREAST, UNSPECIFIED ESTROGEN RECEPTOR STATUS (HCC): ICD-10-CM

## 2023-03-10 PROCEDURE — 6360000004 HC RX CONTRAST MEDICATION: Performed by: INTERNAL MEDICINE

## 2023-03-10 PROCEDURE — C8908 MRI W/O FOL W/CONT, BREAST,: HCPCS

## 2023-03-10 PROCEDURE — A9579 GAD-BASE MR CONTRAST NOS,1ML: HCPCS | Performed by: INTERNAL MEDICINE

## 2023-03-10 RX ORDER — SODIUM CHLORIDE 0.9 % (FLUSH) 0.9 %
10 SYRINGE (ML) INJECTION AS NEEDED
Status: DISCONTINUED | OUTPATIENT
Start: 2023-03-10 | End: 2023-03-14 | Stop reason: HOSPADM

## 2023-03-10 RX ADMIN — GADOTERIDOL 16 ML: 279.3 INJECTION, SOLUTION INTRAVENOUS at 16:01

## 2023-03-22 ENCOUNTER — HOSPITAL ENCOUNTER (OUTPATIENT)
Dept: LAB | Age: 43
Discharge: HOME | End: 2023-03-25
Payer: COMMERCIAL

## 2023-03-22 ENCOUNTER — OFFICE VISIT (OUTPATIENT)
Dept: ONCOLOGY | Age: 43
End: 2023-03-22
Payer: COMMERCIAL

## 2023-03-22 VITALS
WEIGHT: 177.7 LBS | HEART RATE: 69 BPM | HEIGHT: 64 IN | SYSTOLIC BLOOD PRESSURE: 111 MMHG | BODY MASS INDEX: 30.34 KG/M2 | RESPIRATION RATE: 16 BRPM | TEMPERATURE: 98.5 F | OXYGEN SATURATION: 99 % | DIASTOLIC BLOOD PRESSURE: 70 MMHG

## 2023-03-22 DIAGNOSIS — R53.83 OTHER FATIGUE: ICD-10-CM

## 2023-03-22 DIAGNOSIS — C50.312 MALIGNANT NEOPLASM OF LOWER-INNER QUADRANT OF LEFT FEMALE BREAST, UNSPECIFIED ESTROGEN RECEPTOR STATUS (HCC): Primary | ICD-10-CM

## 2023-03-22 DIAGNOSIS — C50.312 MALIGNANT NEOPLASM OF LOWER-INNER QUADRANT OF LEFT FEMALE BREAST, UNSPECIFIED ESTROGEN RECEPTOR STATUS (HCC): ICD-10-CM

## 2023-03-22 LAB
ALBUMIN SERPL-MCNC: 3.8 G/DL (ref 3.5–5)
ALBUMIN/GLOB SERPL: 1.2 (ref 0.4–1.6)
ALP SERPL-CCNC: 93 U/L (ref 50–136)
ALT SERPL-CCNC: 18 U/L (ref 12–65)
ANION GAP SERPL CALC-SCNC: 4 MMOL/L (ref 2–11)
AST SERPL-CCNC: 15 U/L (ref 15–37)
BASOPHILS # BLD: 0 K/UL (ref 0–0.2)
BASOPHILS NFR BLD: 1 % (ref 0–2)
BILIRUB SERPL-MCNC: 1.4 MG/DL (ref 0.2–1.1)
BUN SERPL-MCNC: 11 MG/DL (ref 6–23)
CALCIUM SERPL-MCNC: 8.9 MG/DL (ref 8.3–10.4)
CANCER AG15-3 SERPL-ACNC: 24.9 U/ML (ref 1–35)
CHLORIDE SERPL-SCNC: 107 MMOL/L (ref 101–110)
CO2 SERPL-SCNC: 27 MMOL/L (ref 21–32)
CORTIS BS SERPL-MCNC: 12.2 UG/DL
CREAT SERPL-MCNC: 0.9 MG/DL (ref 0.6–1)
DIFFERENTIAL METHOD BLD: NORMAL
EOSINOPHIL # BLD: 0.1 K/UL (ref 0–0.8)
EOSINOPHIL NFR BLD: 2 % (ref 0.5–7.8)
ERYTHROCYTE [DISTWIDTH] IN BLOOD BY AUTOMATED COUNT: 11.9 % (ref 11.9–14.6)
FERRITIN SERPL-MCNC: 149 NG/ML (ref 8–388)
FOLATE SERPL-MCNC: 8.9 NG/ML (ref 3.1–17.5)
GLOBULIN SER CALC-MCNC: 3.2 G/DL (ref 2.8–4.5)
GLUCOSE SERPL-MCNC: 87 MG/DL (ref 65–100)
HCT VFR BLD AUTO: 41 % (ref 35.8–46.3)
HGB BLD-MCNC: 13.5 G/DL (ref 11.7–15.4)
IMM GRANULOCYTES # BLD AUTO: 0 K/UL (ref 0–0.5)
IMM GRANULOCYTES NFR BLD AUTO: 0 % (ref 0–5)
IRON SATN MFR SERPL: 35 %
IRON SERPL-MCNC: 105 UG/DL (ref 35–150)
LYMPHOCYTES # BLD: 1 K/UL (ref 0.5–4.6)
LYMPHOCYTES NFR BLD: 21 % (ref 13–44)
MCH RBC QN AUTO: 30 PG (ref 26.1–32.9)
MCHC RBC AUTO-ENTMCNC: 32.9 G/DL (ref 31.4–35)
MCV RBC AUTO: 91.1 FL (ref 82–102)
MONOCYTES # BLD: 0.4 K/UL (ref 0.1–1.3)
MONOCYTES NFR BLD: 8 % (ref 4–12)
NEUTS SEG # BLD: 3.4 K/UL (ref 1.7–8.2)
NEUTS SEG NFR BLD: 69 % (ref 43–78)
NRBC # BLD: 0 K/UL (ref 0–0.2)
PLATELET # BLD AUTO: 187 K/UL (ref 150–450)
PMV BLD AUTO: 9.7 FL (ref 9.4–12.3)
POTASSIUM SERPL-SCNC: 3.8 MMOL/L (ref 3.5–5.1)
PROT SERPL-MCNC: 7 G/DL (ref 6.3–8.2)
RBC # BLD AUTO: 4.5 M/UL (ref 4.05–5.2)
SODIUM SERPL-SCNC: 138 MMOL/L (ref 133–143)
TIBC SERPL-MCNC: 304 UG/DL (ref 250–450)
VIT B12 SERPL-MCNC: 280 PG/ML (ref 193–986)
WBC # BLD AUTO: 4.9 K/UL (ref 4.3–11.1)

## 2023-03-22 PROCEDURE — 82607 VITAMIN B-12: CPT

## 2023-03-22 PROCEDURE — 83540 ASSAY OF IRON: CPT

## 2023-03-22 PROCEDURE — 85025 COMPLETE CBC W/AUTO DIFF WBC: CPT

## 2023-03-22 PROCEDURE — 36415 COLL VENOUS BLD VENIPUNCTURE: CPT

## 2023-03-22 PROCEDURE — 82746 ASSAY OF FOLIC ACID SERUM: CPT

## 2023-03-22 PROCEDURE — 99214 OFFICE O/P EST MOD 30 MIN: CPT | Performed by: INTERNAL MEDICINE

## 2023-03-22 PROCEDURE — 82728 ASSAY OF FERRITIN: CPT

## 2023-03-22 PROCEDURE — 80053 COMPREHEN METABOLIC PANEL: CPT

## 2023-03-22 PROCEDURE — 86300 IMMUNOASSAY TUMOR CA 15-3: CPT

## 2023-03-22 PROCEDURE — 82533 TOTAL CORTISOL: CPT

## 2023-03-22 RX ORDER — FLUOXETINE HYDROCHLORIDE 20 MG/1
20 CAPSULE ORAL DAILY
COMMUNITY
Start: 2023-03-02

## 2023-03-22 RX ORDER — OMEPRAZOLE 20 MG/1
CAPSULE, DELAYED RELEASE ORAL
COMMUNITY
Start: 2023-03-02

## 2023-03-22 ASSESSMENT — PATIENT HEALTH QUESTIONNAIRE - PHQ9
SUM OF ALL RESPONSES TO PHQ QUESTIONS 1-9: 0
1. LITTLE INTEREST OR PLEASURE IN DOING THINGS: 0
SUM OF ALL RESPONSES TO PHQ QUESTIONS 1-9: 0
SUM OF ALL RESPONSES TO PHQ9 QUESTIONS 1 & 2: 0
SUM OF ALL RESPONSES TO PHQ QUESTIONS 1-9: 0
2. FEELING DOWN, DEPRESSED OR HOPELESS: 0
SUM OF ALL RESPONSES TO PHQ QUESTIONS 1-9: 0

## 2023-03-22 NOTE — PROGRESS NOTES
82.0 - 102.0 FL Final    MCH 03/22/2023 30.0  26.1 - 32.9 PG Final    MCHC 03/22/2023 32.9  31.4 - 35.0 g/dL Final    RDW 03/22/2023 11.9  11.9 - 14.6 % Final    Platelets 20/87/5968 187  150 - 450 K/uL Final    MPV 03/22/2023 9.7  9.4 - 12.3 FL Final    nRBC 03/22/2023 0.00  0.0 - 0.2 K/uL Final    **Note: Absolute NRBC parameter is now reported with Hemogram**    Differential Type 03/22/2023 AUTOMATED    Final    Seg Neutrophils 03/22/2023 69  43 - 78 % Final    Lymphocytes 03/22/2023 21  13 - 44 % Final    Monocytes 03/22/2023 8  4.0 - 12.0 % Final    Eosinophils % 03/22/2023 2  0.5 - 7.8 % Final    Basophils 03/22/2023 1  0.0 - 2.0 % Final    Immature Granulocytes 03/22/2023 0  0.0 - 5.0 % Final    Segs Absolute 03/22/2023 3.4  1.7 - 8.2 K/UL Final    Absolute Lymph # 03/22/2023 1.0  0.5 - 4.6 K/UL Final    Absolute Mono # 03/22/2023 0.4  0.1 - 1.3 K/UL Final    Absolute Eos # 03/22/2023 0.1  0.0 - 0.8 K/UL Final    Basophils Absolute 03/22/2023 0.0  0.0 - 0.2 K/UL Final    Absolute Immature Granulocyte 03/22/2023 0.0  0.0 - 0.5 K/UL Final             Imaging:   BREAST MRI BEFORE AND AFTER CONTRAST       CLINICAL HISTORY:  30-year-old with recent needle biopsy diagnosis of a palpable   left infiltrating duct carcinoma; for preoperative evaluation. TECHNIQUE: Standard axial and sagittal images were obtained before and during   bolus injection of 14 cc of MultiHance IV. CAD was employed. COMPARISON:  Multiple prior studies including the baseline bilateral mammography   of July 20, 2016 and left ultrasound biopsy of July 26, 2016. FINDINGS: There is moderate background parenchymal enhancement bilaterally. Nodular enhancement surrounding the left breast 8:30 biopsy marker clip has   maximal diameter of 2.3 cm by MRI. There is preservation of fat plane between   the mass and underlying pectoralis. There are very small, normal-appearing   intramammary lymph nodes bilaterally.   No suspicious

## 2023-03-22 NOTE — PATIENT INSTRUCTIONS
Fever of 100.5 or greater  Chills  Shortness of breath  Swelling or pain in one leg    After office hours an answering service is available and will contact a provider for emergencies or if you are experiencing any of the above symptoms. Patient does express an interest in My Chart. My Chart log in information explained on the after visit summary printout at the [facility] Patrick Lewisa [address] desk.     Jose Maria Garza RN

## 2023-04-14 NOTE — ANESTHESIA POSTPROCEDURE EVALUATION
Procedure(s): RIGHT FOOT EXCISION SOFT TISSUE MASS. Anesthesia Post Evaluation Multimodal analgesia: multimodal analgesia used between 6 hours prior to anesthesia start to PACU discharge Patient location during evaluation: PACU Patient participation: complete - patient participated Level of consciousness: awake and alert Pain management: adequate Airway patency: patent Anesthetic complications: no 
Cardiovascular status: acceptable and hemodynamically stable Respiratory status: acceptable Hydration status: acceptable Visit Vitals /71 Pulse 67 Temp 36.5 °C (97.7 °F) Resp 14 Wt 78.9 kg (174 lb) SpO2 98% BMI 29.87 kg/m² Cimzia Counseling:  I discussed with the patient the risks of Cimzia including but not limited to immunosuppression, allergic reactions and infections.  The patient understands that monitoring is required including a PPD at baseline and must alert us or the primary physician if symptoms of infection or other concerning signs are noted.

## 2023-08-08 NOTE — PROGRESS NOTES
distress. Exam deferred, talk only    ASSESSMENT:  Encounter Diagnoses   Name Primary? Other fatigue     Mood changes     Thyroid disorder screening     Vitamin D deficiency     Breakthrough bleeding associated with intrauterine device (IUD) Yes       PLAN:  All questions answered  Diagnosis explained in detail, including differential  Lengthy disc with pt on bleeding with IUD we disc common nature of spotting with IUD and does not necessarily indicate a problem, but in rare cases can indicate malposition. She will rtc for US to eval IUD position and gyn organs. Lengthy disc with pt re: hormone complaints. Disc hesitant to draw estrogen levels as we cannot replace her estrogen due to hx breast cancer. Rec checking TFTs and Vit D and will consider  tx based on findings. Orders Placed This Encounter   Procedures    TSH     Standing Status:   Future     Number of Occurrences:   1     Standing Expiration Date:   8/9/2024    T4, Free     Standing Status:   Future     Number of Occurrences:   1     Standing Expiration Date:   8/9/2024    Vitamin D 25 Hydroxy     Standing Status:   Future     Number of Occurrences:   1     Standing Expiration Date:   8/9/2024         Supervising physician is Dr. Brock. Greater than 50% of the 20 minute visit were spent in counseling to the above topics.

## 2023-08-09 ENCOUNTER — OFFICE VISIT (OUTPATIENT)
Dept: OBGYN CLINIC | Age: 43
End: 2023-08-09
Payer: COMMERCIAL

## 2023-08-09 VITALS
HEIGHT: 64 IN | WEIGHT: 181.1 LBS | BODY MASS INDEX: 30.92 KG/M2 | DIASTOLIC BLOOD PRESSURE: 72 MMHG | SYSTOLIC BLOOD PRESSURE: 124 MMHG

## 2023-08-09 DIAGNOSIS — R53.83 OTHER FATIGUE: ICD-10-CM

## 2023-08-09 DIAGNOSIS — R45.86 MOOD CHANGES: ICD-10-CM

## 2023-08-09 DIAGNOSIS — N92.1 BREAKTHROUGH BLEEDING ASSOCIATED WITH INTRAUTERINE DEVICE (IUD): Primary | ICD-10-CM

## 2023-08-09 DIAGNOSIS — Z13.29 THYROID DISORDER SCREENING: ICD-10-CM

## 2023-08-09 DIAGNOSIS — E55.9 VITAMIN D DEFICIENCY: ICD-10-CM

## 2023-08-09 DIAGNOSIS — Z97.5 BREAKTHROUGH BLEEDING ASSOCIATED WITH INTRAUTERINE DEVICE (IUD): Primary | ICD-10-CM

## 2023-08-09 PROCEDURE — 99214 OFFICE O/P EST MOD 30 MIN: CPT | Performed by: NURSE PRACTITIONER

## 2023-09-19 SDOH — ECONOMIC STABILITY: FOOD INSECURITY: WITHIN THE PAST 12 MONTHS, THE FOOD YOU BOUGHT JUST DIDN'T LAST AND YOU DIDN'T HAVE MONEY TO GET MORE.: NEVER TRUE

## 2023-09-19 SDOH — ECONOMIC STABILITY: FOOD INSECURITY: WITHIN THE PAST 12 MONTHS, YOU WORRIED THAT YOUR FOOD WOULD RUN OUT BEFORE YOU GOT MONEY TO BUY MORE.: NEVER TRUE

## 2023-09-19 SDOH — ECONOMIC STABILITY: INCOME INSECURITY: HOW HARD IS IT FOR YOU TO PAY FOR THE VERY BASICS LIKE FOOD, HOUSING, MEDICAL CARE, AND HEATING?: NOT HARD AT ALL

## 2023-09-19 SDOH — ECONOMIC STABILITY: HOUSING INSECURITY
IN THE LAST 12 MONTHS, WAS THERE A TIME WHEN YOU DID NOT HAVE A STEADY PLACE TO SLEEP OR SLEPT IN A SHELTER (INCLUDING NOW)?: NO

## 2023-09-19 SDOH — ECONOMIC STABILITY: TRANSPORTATION INSECURITY
IN THE PAST 12 MONTHS, HAS LACK OF TRANSPORTATION KEPT YOU FROM MEETINGS, WORK, OR FROM GETTING THINGS NEEDED FOR DAILY LIVING?: NO

## 2023-09-22 ENCOUNTER — OFFICE VISIT (OUTPATIENT)
Dept: ONCOLOGY | Age: 43
End: 2023-09-22
Payer: COMMERCIAL

## 2023-09-22 VITALS
WEIGHT: 182.7 LBS | OXYGEN SATURATION: 100 % | HEART RATE: 68 BPM | SYSTOLIC BLOOD PRESSURE: 113 MMHG | HEIGHT: 64 IN | BODY MASS INDEX: 31.19 KG/M2 | DIASTOLIC BLOOD PRESSURE: 65 MMHG | RESPIRATION RATE: 18 BRPM | TEMPERATURE: 98.6 F

## 2023-09-22 DIAGNOSIS — C50.312 MALIGNANT NEOPLASM OF LOWER-INNER QUADRANT OF LEFT FEMALE BREAST, UNSPECIFIED ESTROGEN RECEPTOR STATUS (HCC): Primary | ICD-10-CM

## 2023-09-22 DIAGNOSIS — C50.912 RECURRENT BREAST CANCER, LEFT (HCC): ICD-10-CM

## 2023-09-22 DIAGNOSIS — R53.83 OTHER FATIGUE: ICD-10-CM

## 2023-09-22 DIAGNOSIS — N64.4 BREAST PAIN: ICD-10-CM

## 2023-09-22 PROCEDURE — 99214 OFFICE O/P EST MOD 30 MIN: CPT | Performed by: NURSE PRACTITIONER

## 2023-09-22 ASSESSMENT — PATIENT HEALTH QUESTIONNAIRE - PHQ9
SUM OF ALL RESPONSES TO PHQ9 QUESTIONS 1 & 2: 0
1. LITTLE INTEREST OR PLEASURE IN DOING THINGS: 0
SUM OF ALL RESPONSES TO PHQ QUESTIONS 1-9: 0
2. FEELING DOWN, DEPRESSED OR HOPELESS: 0

## 2023-09-22 NOTE — PROGRESS NOTES
[facility] Hematology and Oncology: Office Visit Established Patient     Chief Complaint:    Chief Complaint   Patient presents with    Follow-up         History of Present Illness:  Ms. Melissa Goss  is a 46 y.o. female who presents  today in follow-up regarding breast cancer. She noted a a lump in the inner lower quadrant of her left breast in July 2016. She noticed this after a workout that made her sore. When this did not resolve, she went for mammogram and ultrasound  which showed irregular and heterogeneously hypoechoic mass with spiculated margins. This overall measured 1.7 x 1.3 x 1.8 cm. She went for biopsy which showed invasive ductal carcinoma, g2, with ER 2% and AL negative, HER2 negative. MRI showed the mass  to be 2.3 cm with no other evidence of disease noted. She saw Dr. Jackson Lo who recommended oncology consultation to discuss therapy options. After discussion, she opted for upfront bilateral mastectomy, which was completed on 8/30/16, sentinel node  negative. Completed ddAC followed by T in March 2017. No radiation due to T2N0 disease with mastectomy. Tamoxifen started for (low) ER expression but then stopped due to toxicity, then just monitored on observation. BRCA negative. March 2021, she  noted a new nodule in the left breast, MRI imaging showed the mass to be 1.3 cm in size and was suspicious enough that biopsy was recommended. Unfortunately, this confirms breast cancer, high grade, triple negative. MRI did not show any distant disease. We recommended surgical evaluation to discuss resection. Path reviewed and showed the tumor to be 1.6 cm, 9 lymph nodes were negative ([id number]). Her updated genetics were negative. We discussed the role of adjuvant therapy, she cannot receive anthracyclines again, but I would like to incorporate carboplatin given the triple negative histology and the desire for multiagent chemotherapy.   I would  lean toward Taxotere and carboplatin q3w for 6

## 2023-09-25 ENCOUNTER — PROCEDURE VISIT (OUTPATIENT)
Dept: OBGYN CLINIC | Age: 43
End: 2023-09-25
Payer: COMMERCIAL

## 2023-09-25 ENCOUNTER — OFFICE VISIT (OUTPATIENT)
Dept: OBGYN CLINIC | Age: 43
End: 2023-09-25
Payer: COMMERCIAL

## 2023-09-25 VITALS
DIASTOLIC BLOOD PRESSURE: 84 MMHG | WEIGHT: 180.8 LBS | HEIGHT: 64 IN | BODY MASS INDEX: 30.87 KG/M2 | SYSTOLIC BLOOD PRESSURE: 126 MMHG

## 2023-09-25 DIAGNOSIS — N93.9 ABNORMAL UTERINE BLEEDING (AUB): Primary | ICD-10-CM

## 2023-09-25 DIAGNOSIS — Z97.5 IUD (INTRAUTERINE DEVICE) IN PLACE: ICD-10-CM

## 2023-09-25 DIAGNOSIS — Z13.89 SCREENING FOR GENITOURINARY CONDITION: ICD-10-CM

## 2023-09-25 DIAGNOSIS — Z01.419 WELL WOMAN EXAM: Primary | ICD-10-CM

## 2023-09-25 LAB
BILIRUBIN, URINE, POC: NEGATIVE
BLOOD URINE, POC: NEGATIVE
GLUCOSE URINE, POC: NEGATIVE
KETONES, URINE, POC: NEGATIVE
LEUKOCYTE ESTERASE, URINE, POC: NEGATIVE
NITRITE, URINE, POC: NEGATIVE
PH, URINE, POC: 5.5 (ref 4.6–8)
PROTEIN,URINE, POC: NEGATIVE
SPECIFIC GRAVITY, URINE, POC: 1.03 (ref 1–1.03)
URINALYSIS CLARITY, POC: CLEAR
URINALYSIS COLOR, POC: YELLOW
UROBILINOGEN, POC: NORMAL

## 2023-09-25 PROCEDURE — 99396 PREV VISIT EST AGE 40-64: CPT | Performed by: NURSE PRACTITIONER

## 2023-09-25 PROCEDURE — 76830 TRANSVAGINAL US NON-OB: CPT | Performed by: OBSTETRICS & GYNECOLOGY

## 2023-09-25 PROCEDURE — 81003 URINALYSIS AUTO W/O SCOPE: CPT | Performed by: NURSE PRACTITIONER

## 2023-09-25 NOTE — PROGRESS NOTES
Patient presents today for a routine gynecological examination with no complaints. Last pap smear: 09/15/2022 Negative. HPV Negative. Declines breast exam today. Patient states she just saw oncologist on Friday. History of breast cancer. Pt notes since her last visit her BTB with IUD has improved and she has no current complaints of bleeding with IUD in place. No pain. Patient had complained of spotting w/ IUD. Mirena placed 18. Ultrasound findings from today:   Uterus is retroverted and homogenous. Endo= 4 mm, no intracavitary mass visualized,   ROV appears wnl   LOV 2 simple cysts  Cyst 1= 4.0 x 2.5 x 3.0 cm   Cyst 2= 2.3 x 1.9 x 2.4 cm (clc)   Mild free fluid in left adnexa  IUD in proper placement at fundal endo with T-arms extended laterally. OB History          2    Para   1    Term   1            AB   1    Living   1         SAB   1    IAB        Ectopic        Molar        Multiple        Live Births   1                  GYN History           No LMP recorded (lmp unknown). (Menstrual status: IUD).  ; negative postcoital bleeding    Past Medical History:  Past Medical History:   Diagnosis Date    Breast cancer ([address])     x 2    Cancer ([address]) dx--16    left breast cancer--- surgery and chemo     MRSA infection 2014    LEFT EAR       Past Surgical History:  Past Surgical History:   Procedure Laterality Date    BREAST BIOPSY Left 2021    BREAST CAPSULECTOMY Left 2022    LEFT BREAST CAPSULECTOMY WITH IMPLANT EXCHANGE performed by Brina Lewis MD at [address] LUMPECTOMY Left 3/31/2021    LEFT BREAST LUMPECTOMY performed by Kevin Yañez MD at [address] Left 3/31/2021    LEFT BREAST COMPLEX CLOSURE performed by Brina Lewis MD at [address] Bilateral 2016    BREAST TISSUE EXPANDER INSERTION AND NIPPLE SPARING/ BILATERAL  performed by Brina Lewis MD at

## 2024-03-12 DIAGNOSIS — C50.312 MALIGNANT NEOPLASM OF LOWER-INNER QUADRANT OF LEFT FEMALE BREAST, UNSPECIFIED ESTROGEN RECEPTOR STATUS (HCC): Primary | ICD-10-CM

## 2024-03-14 ENCOUNTER — HOSPITAL ENCOUNTER (OUTPATIENT)
Dept: MRI IMAGING | Age: 44
Discharge: HOME | End: 2024-03-14
Payer: COMMERCIAL

## 2024-03-14 DIAGNOSIS — C50.312 MALIGNANT NEOPLASM OF LOWER-INNER QUADRANT OF LEFT FEMALE BREAST, UNSPECIFIED ESTROGEN RECEPTOR STATUS (HCC): ICD-10-CM

## 2024-03-14 DIAGNOSIS — C50.912 RECURRENT BREAST CANCER, LEFT (HCC): ICD-10-CM

## 2024-03-14 DIAGNOSIS — N64.4 BREAST PAIN: ICD-10-CM

## 2024-03-14 PROCEDURE — 6360000004 HC RX CONTRAST MEDICATION: Performed by: NURSE PRACTITIONER

## 2024-03-14 PROCEDURE — C8908 MRI W/O FOL W/CONT, BREAST,: HCPCS

## 2024-03-14 PROCEDURE — A9579 GAD-BASE MR CONTRAST NOS,1ML: HCPCS | Performed by: NURSE PRACTITIONER

## 2024-03-14 RX ADMIN — GADOTERIDOL 15 ML: 279.3 INJECTION, SOLUTION INTRAVENOUS at 15:03

## 2024-03-29 ENCOUNTER — OFFICE VISIT (OUTPATIENT)
Dept: ONCOLOGY | Age: 44
End: 2024-03-29
Payer: COMMERCIAL

## 2024-03-29 VITALS
TEMPERATURE: 98.4 F | HEART RATE: 74 BPM | OXYGEN SATURATION: 99 % | HEIGHT: 64 IN | DIASTOLIC BLOOD PRESSURE: 67 MMHG | RESPIRATION RATE: 18 BRPM | SYSTOLIC BLOOD PRESSURE: 111 MMHG | WEIGHT: 179 LBS | BODY MASS INDEX: 30.56 KG/M2

## 2024-03-29 DIAGNOSIS — C50.912 RECURRENT BREAST CANCER, LEFT (HCC): ICD-10-CM

## 2024-03-29 DIAGNOSIS — C50.312 MALIGNANT NEOPLASM OF LOWER-INNER QUADRANT OF LEFT BREAST IN FEMALE, ESTROGEN RECEPTOR NEGATIVE (HCC): Primary | ICD-10-CM

## 2024-03-29 DIAGNOSIS — N64.4 BREAST PAIN: ICD-10-CM

## 2024-03-29 DIAGNOSIS — Z17.1 MALIGNANT NEOPLASM OF LOWER-INNER QUADRANT OF LEFT BREAST IN FEMALE, ESTROGEN RECEPTOR NEGATIVE (HCC): Primary | ICD-10-CM

## 2024-03-29 PROCEDURE — 99214 OFFICE O/P EST MOD 30 MIN: CPT | Performed by: INTERNAL MEDICINE

## 2024-03-29 ASSESSMENT — PATIENT HEALTH QUESTIONNAIRE - PHQ9
2. FEELING DOWN, DEPRESSED OR HOPELESS: NOT AT ALL
1. LITTLE INTEREST OR PLEASURE IN DOING THINGS: NOT AT ALL
SUM OF ALL RESPONSES TO PHQ QUESTIONS 1-9: 0
SUM OF ALL RESPONSES TO PHQ9 QUESTIONS 1 & 2: 0
SUM OF ALL RESPONSES TO PHQ QUESTIONS 1-9: 0

## 2024-03-29 NOTE — PROGRESS NOTES
is distinguished from the background parenchymal enhancement.     No pathologically enlarged or dysmorphic lymph nodes are seen.  The liver,   lungs, and chest wall appear unremarkable as imaged.       IMPRESSION:          1.  SOLITARY LEFT BREAST 8:30 CARCINOMA HAS MAXIMAL DIAMETER OF 2.3 CM BY MRI   WITHOUT EVIDENCE OF MALIGNANCY ELSEWHERE IN EITHER BREAST OR METASTATIC DISEASE   IN THE IMAGED CHEST.       2.  MODERATE BACKGROUND PARENCHYMAL ENHANCEMENT LIMITS EVALUATION FOR POSSIBLE   SMALL MALIGNANCY, AND THEREFORE FOLLOW-UP MRI IS RECOMMENDED IN ONE YEAR.            Pathology:                                   ASSESSMENT:    ICD-10-CM    1. Malignant neoplasm of lower-inner quadrant of left breast in female, estrogen receptor negative (HCC)  C50.312 Comprehensive Metabolic Panel    Z17.1 CBC with Auto Differential     Cancer Antigen 15-3     Comprehensive Metabolic Panel     CBC with Auto Differential     Cancer Antigen 15-3             Patient Active Problem List   Diagnosis    Hypomagnesemia    Recurrent breast cancer, left (HCC)    Dehydration    Pain, generalized    Neutropenic fever (HCC)    Post term pregnancy over 40 weeks    Encounter for IUD removal and reinsertion    S/P bilateral mastectomy    Malignant neoplasm of lower-inner quadrant of left female breast (HCC)       PLAN:  Lab studies were personally reviewed.      Breast cancer: 2.6 cm, g2, IDC, ER 2%, WI negative, HER2 negative.  S/p bilateral mastectomy and L sentinel node, negative.  [id number] (stage IIA).   Her tumor, while only T2N0, is essentially triple negative, rendering endocrine therapy less effective.  Therefore, I recommend that chemotherapy be administered in the adjuvant setting for risk reduction.   I recommend the use of Adriamycin plus Cytoxan administered in a dose dense fashion every two weeks for four cycles, with growth factor support, followed by weekly paclitaxel.  She will not require  radiation due to the N0 disease and

## 2024-03-29 NOTE — PATIENT INSTRUCTIONS
Patient Information from Today's Visit    Labs and symptoms reviewed.     Treatment Summary has been discussed and given to patient:N/A  Follow Up: 6 months    Please refer to After Visit Summary or [facility] for upcoming appointment information. If you have any questions regarding your upcoming schedule please reach out to your care team through [facility] or call [phone].    -------------------------------------------------------------------------------------------------------------------  Please call our office at [phone] if you have any  of the following symptoms:   Fever of 100.5 or greater  Chills  Shortness of breath  Swelling or pain in one leg    After office hours an answering service is available and will contact a provider for emergencies or if you are experiencing any of the above symptoms.    Patient did express an interest in My Chart.  My Chart log in information explained on the after visit summary printout at the check-out desk.    TAPAN ZURITA RN

## 2024-09-30 ENCOUNTER — OFFICE VISIT (OUTPATIENT)
Dept: ONCOLOGY | Age: 44
End: 2024-09-30
Payer: COMMERCIAL

## 2024-09-30 VITALS
WEIGHT: 184 LBS | BODY MASS INDEX: 31.41 KG/M2 | OXYGEN SATURATION: 100 % | HEIGHT: 64 IN | SYSTOLIC BLOOD PRESSURE: 113 MMHG | HEART RATE: 81 BPM | TEMPERATURE: 98.1 F | RESPIRATION RATE: 16 BRPM | DIASTOLIC BLOOD PRESSURE: 72 MMHG

## 2024-09-30 DIAGNOSIS — Z17.1 MALIGNANT NEOPLASM OF LOWER-INNER QUADRANT OF LEFT BREAST IN FEMALE, ESTROGEN RECEPTOR NEGATIVE (HCC): Primary | ICD-10-CM

## 2024-09-30 DIAGNOSIS — C50.912 RECURRENT BREAST CANCER, LEFT (HCC): ICD-10-CM

## 2024-09-30 DIAGNOSIS — C50.312 MALIGNANT NEOPLASM OF LOWER-INNER QUADRANT OF LEFT BREAST IN FEMALE, ESTROGEN RECEPTOR NEGATIVE (HCC): Primary | ICD-10-CM

## 2024-09-30 PROCEDURE — 99214 OFFICE O/P EST MOD 30 MIN: CPT | Performed by: NURSE PRACTITIONER

## 2024-09-30 SDOH — ECONOMIC STABILITY: INCOME INSECURITY: HOW HARD IS IT FOR YOU TO PAY FOR THE VERY BASICS LIKE FOOD, HOUSING, MEDICAL CARE, AND HEATING?: NOT HARD AT ALL

## 2024-09-30 SDOH — ECONOMIC STABILITY: FOOD INSECURITY: WITHIN THE PAST 12 MONTHS, THE FOOD YOU BOUGHT JUST DIDN'T LAST AND YOU DIDN'T HAVE MONEY TO GET MORE.: NEVER TRUE

## 2024-09-30 SDOH — ECONOMIC STABILITY: FOOD INSECURITY: WITHIN THE PAST 12 MONTHS, YOU WORRIED THAT YOUR FOOD WOULD RUN OUT BEFORE YOU GOT MONEY TO BUY MORE.: NEVER TRUE

## 2024-09-30 ASSESSMENT — PATIENT HEALTH QUESTIONNAIRE - PHQ9
SUM OF ALL RESPONSES TO PHQ QUESTIONS 1-9: 0
SUM OF ALL RESPONSES TO PHQ QUESTIONS 1-9: 0
1. LITTLE INTEREST OR PLEASURE IN DOING THINGS: NOT AT ALL
2. FEELING DOWN, DEPRESSED OR HOPELESS: NOT AT ALL
SUM OF ALL RESPONSES TO PHQ9 QUESTIONS 1 & 2: 0
SUM OF ALL RESPONSES TO PHQ QUESTIONS 1-9: 0
SUM OF ALL RESPONSES TO PHQ QUESTIONS 1-9: 0

## 2024-09-30 NOTE — PROGRESS NOTES
Family History   Problem Relation Age of Onset    Cancer Sister         Thyroid    Heart Attack Maternal Grandfather     Heart Disease Maternal Grandfather     Hypertension Mother     Stroke Paternal Grandmother     Diabetes Paternal Grandmother     Bipolar Disorder Sister     Asthma Sister     Mental Illness Sister         Bipolar     Social History     Socioeconomic History    Marital status:      Spouse name: Not on file    Number of children: Not on file    Years of education: Not on file    Highest education level: Not on file   Occupational History    Not on file   Tobacco Use    Smoking status: Never    Smokeless tobacco: Never   Vaping Use    Vaping status: Never Used   Substance and Sexual Activity    Alcohol use: Not Currently    Drug use: No    Sexual activity: Yes     Partners: Male     Birth control/protection: I.U.D.   Other Topics Concern    Not on file   Social History Narrative    Not on file     Social Determinants of Health     Financial Resource Strain: Not on file   Food Insecurity: Not on file (9/30/2024)   Transportation Needs: Not on file   Physical Activity: Not on file   Stress: Not on file   Social Connections: Unknown (3/29/2021)    Received from [employer]    Social Connections     Frequency of Communication with Friends and Family: Not asked     Frequency of Social Gatherings with Friends and Family: Not asked   Intimate Partner Violence: Unknown (3/29/2021)    Received from [employer]    Intimate Partner Violence     Fear of Current or Ex-Partner: Not asked     Emotionally Abused: Not asked     Physically Abused: Not asked     Sexually Abused: Not asked   Housing Stability: Unknown (9/30/2024)    Housing Stability Vital Sign     Unable to Pay for Housing in the Last Year: Not on file     Number of Times Moved in the Last Year: Not on file     Homeless in the Last Year: No     Current Outpatient Medications   Medication Sig Dispense

## 2024-10-01 NOTE — PROGRESS NOTES
HPI:  Ms. Hillman is a 44 y.o. female  who is here today for a well woman exam. She complains of nothing.   Mirena placed 18.  Has some sporadic spotting with her iud.  Good until .       Date Performed Result   PAP 09/15/2022 Negative. HPV Negative.   Mammogram 2024  No suspicious findings on breast MRI.   Personal history of breast cancer post bilateral mastectomy and implant placement.   Colonoscopy NA NA   Dexa NA NA       OB History          2    Para   1    Term   1            AB   1    Living   1         SAB   1    IAB        Ectopic        Molar        Multiple        Live Births   1            Daughter 12   GYN History     No LMP recorded (lmp unknown). (Menstrual status: IUD).     Past Medical History:   Diagnosis Date    Breast cancer (HCC)     x 2    Cancer (HCC) dx--16    left breast cancer--- surgery and chemo     MRSA infection     LEFT EAR     Past Surgical History:   Procedure Laterality Date    BREAST BIOPSY Left 2021    BREAST CAPSULECTOMY Left 2022    LEFT BREAST CAPSULECTOMY WITH IMPLANT EXCHANGE performed by Elizabeth R Blakemore, MD at [facility] MAIN OR    BREAST LUMPECTOMY Left 3/31/2021    LEFT BREAST LUMPECTOMY performed by Julio Ahumada MD at [facility] MAIN OR    BREAST RECONSTRUCTION Left 3/31/2021    LEFT BREAST COMPLEX CLOSURE performed by Blakemore, Elizabeth R, MD at [facility] MAIN OR    BREAST RECONSTRUCTION Bilateral 2016    BREAST TISSUE EXPANDER INSERTION AND NIPPLE SPARING/ BILATERAL  performed by Elizabeth R Blakemore, MD at [facility] MAIN OR    BREAST RECONSTRUCTION Bilateral 2017    BILATERAL BREAST TISSUE EXPANDER REMOVAL /PLACEMENT OF PERMANENT IMPLANTS performed by Elizabeth R Blakemore, MD at [facility] MAIN OR    BREAST RECONSTRUCTION Bilateral 2020    BILATERAL REVISION OF BREAST RECONSTRUCTION/ performed by Blakemore, Elizabeth R, MD at [facility] MAIN OR    CERVICAL POLYP REMOVAL      UTERINE     SECTION  2012    x 1     IR

## 2024-10-03 ENCOUNTER — OFFICE VISIT (OUTPATIENT)
Dept: OBGYN CLINIC | Age: 44
End: 2024-10-03
Payer: COMMERCIAL

## 2024-10-03 VITALS
HEIGHT: 64 IN | SYSTOLIC BLOOD PRESSURE: 124 MMHG | BODY MASS INDEX: 31.96 KG/M2 | DIASTOLIC BLOOD PRESSURE: 82 MMHG | WEIGHT: 187.2 LBS

## 2024-10-03 DIAGNOSIS — Z12.4 SCREENING FOR CERVICAL CANCER: ICD-10-CM

## 2024-10-03 DIAGNOSIS — Z11.51 SCREENING FOR HUMAN PAPILLOMAVIRUS (HPV): ICD-10-CM

## 2024-10-03 DIAGNOSIS — Z01.419 WELL WOMAN EXAM: Primary | ICD-10-CM

## 2024-10-03 DIAGNOSIS — Z13.89 SCREENING FOR GENITOURINARY CONDITION: ICD-10-CM

## 2024-10-03 LAB
BILIRUBIN, URINE, POC: NEGATIVE
BLOOD URINE, POC: NORMAL
GLUCOSE URINE, POC: NEGATIVE
KETONES, URINE, POC: NEGATIVE
LEUKOCYTE ESTERASE, URINE, POC: NEGATIVE
NITRITE, URINE, POC: NEGATIVE
PH, URINE, POC: 5.5 (ref 4.6–8)
PROTEIN,URINE, POC: NEGATIVE
SPECIFIC GRAVITY, URINE, POC: 1.03 (ref 1–1.03)
URINALYSIS CLARITY, POC: NORMAL
URINALYSIS COLOR, POC: NORMAL
UROBILINOGEN, POC: NORMAL MG/DL

## 2024-10-03 PROCEDURE — 99396 PREV VISIT EST AGE 40-64: CPT | Performed by: OBSTETRICS & GYNECOLOGY

## 2024-10-03 PROCEDURE — 81002 URINALYSIS NONAUTO W/O SCOPE: CPT | Performed by: OBSTETRICS & GYNECOLOGY

## 2024-10-03 RX ORDER — FLUTICASONE PROPIONATE 0.05 %
1 CREAM (GRAM) TOPICAL 2 TIMES DAILY
COMMUNITY
Start: 2024-07-17

## 2024-10-12 LAB
COLLECTION METHOD: ABNORMAL
CYTOLOGIST CVX/VAG CYTO: ABNORMAL
CYTOLOGY CVX/VAG DOC THIN PREP: ABNORMAL
HPV APTIMA: POSITIVE
HPV GENOTYPE 18,45: NEGATIVE
HPV, GENOTYPE 16: NEGATIVE
Lab: ABNORMAL
PAP SOURCE: ABNORMAL
PATH REPORT.FINAL DX SPEC: ABNORMAL
STAT OF ADQ CVX/VAG CYTO-IMP: ABNORMAL

## 2025-03-14 DIAGNOSIS — C50.312 MALIGNANT NEOPLASM OF LOWER-INNER QUADRANT OF LEFT FEMALE BREAST, UNSPECIFIED ESTROGEN RECEPTOR STATUS (HCC): ICD-10-CM

## 2025-03-14 RX ORDER — LORAZEPAM 1 MG/1
1 TABLET ORAL PRN
Qty: 2 TABLET | Refills: 0 | Status: SHIPPED | OUTPATIENT
Start: 2025-03-14 | End: 2025-04-03

## 2025-03-14 NOTE — TELEPHONE ENCOUNTER
Prescription for Ativan 1 mg PRN for anxiety with MRI scan up to 2 doses routed to Dr. Raman for signature.    I have reviewed the patient’s controlled substance prescription history, as maintained in the [place] prescription monitoring program, so that the prescription(s) for lorazepam a  controlled substance can be given.

## 2025-03-19 ENCOUNTER — HOSPITAL ENCOUNTER (OUTPATIENT)
Dept: MRI IMAGING | Age: 45
Discharge: HOME | End: 2025-03-22
Attending: INTERNAL MEDICINE
Payer: COMMERCIAL

## 2025-03-19 DIAGNOSIS — C50.312 MALIGNANT NEOPLASM OF LOWER-INNER QUADRANT OF LEFT BREAST IN FEMALE, ESTROGEN RECEPTOR NEGATIVE: ICD-10-CM

## 2025-03-19 DIAGNOSIS — Z17.1 MALIGNANT NEOPLASM OF LOWER-INNER QUADRANT OF LEFT BREAST IN FEMALE, ESTROGEN RECEPTOR NEGATIVE: ICD-10-CM

## 2025-03-19 DIAGNOSIS — C50.912 RECURRENT BREAST CANCER, LEFT: ICD-10-CM

## 2025-03-19 DIAGNOSIS — N64.4 BREAST PAIN: ICD-10-CM

## 2025-03-19 PROCEDURE — C8908 MRI W/O FOL W/CONT, BREAST,: HCPCS

## 2025-03-19 PROCEDURE — A9579 GAD-BASE MR CONTRAST NOS,1ML: HCPCS | Performed by: INTERNAL MEDICINE

## 2025-03-19 PROCEDURE — 6360000004 HC RX CONTRAST MEDICATION: Performed by: INTERNAL MEDICINE

## 2025-03-19 RX ADMIN — GADOTERIDOL 16 ML: 279.3 INJECTION, SOLUTION INTRAVENOUS at 15:26

## 2025-03-20 ENCOUNTER — RESULTS FOLLOW-UP (OUTPATIENT)
Dept: MRI IMAGING | Age: 45
End: 2025-03-20

## 2025-04-04 ENCOUNTER — OFFICE VISIT (OUTPATIENT)
Dept: ONCOLOGY | Age: 45
End: 2025-04-04
Payer: COMMERCIAL

## 2025-04-04 VITALS
OXYGEN SATURATION: 100 % | HEART RATE: 76 BPM | BODY MASS INDEX: 31.45 KG/M2 | RESPIRATION RATE: 27 BRPM | HEIGHT: 64 IN | WEIGHT: 184.2 LBS | TEMPERATURE: 98.2 F | SYSTOLIC BLOOD PRESSURE: 118 MMHG | DIASTOLIC BLOOD PRESSURE: 82 MMHG

## 2025-04-04 DIAGNOSIS — Z17.1 MALIGNANT NEOPLASM OF LOWER-INNER QUADRANT OF LEFT BREAST IN FEMALE, ESTROGEN RECEPTOR NEGATIVE: Primary | ICD-10-CM

## 2025-04-04 DIAGNOSIS — C50.312 MALIGNANT NEOPLASM OF LOWER-INNER QUADRANT OF LEFT BREAST IN FEMALE, ESTROGEN RECEPTOR NEGATIVE: Primary | ICD-10-CM

## 2025-04-04 DIAGNOSIS — C50.912 RECURRENT BREAST CANCER, LEFT: ICD-10-CM

## 2025-04-04 PROCEDURE — 99214 OFFICE O/P EST MOD 30 MIN: CPT | Performed by: INTERNAL MEDICINE

## 2025-04-04 ASSESSMENT — PATIENT HEALTH QUESTIONNAIRE - PHQ9
SUM OF ALL RESPONSES TO PHQ QUESTIONS 1-9: 0
SUM OF ALL RESPONSES TO PHQ QUESTIONS 1-9: 0
2. FEELING DOWN, DEPRESSED OR HOPELESS: NOT AT ALL
SUM OF ALL RESPONSES TO PHQ QUESTIONS 1-9: 0
1. LITTLE INTEREST OR PLEASURE IN DOING THINGS: NOT AT ALL
SUM OF ALL RESPONSES TO PHQ QUESTIONS 1-9: 0

## 2025-04-04 NOTE — PATIENT INSTRUCTIONS
Patient Information from Today's Visit    The members of your [facility] are listed below:    Physician Provider: Kush Raman Medical Oncologist  Advanced Practice Clinician: Blanca Cornejo NP  Registered Nurse: Tk FRANCO RN  Nurse Navigator: Reanna BUI RN or Abbey BANKS RN  Medical Assistant: Deepa VELASCO CMA  : [place] GUTIERREZ  Supportive Care Services: Gabrielle SHORE LMSW    Diagnosis: Breast Cancer    Follow Up Instructions: 6 months      Treatment Summary has been discussed and given to patient:No      Current Labs: Outside labs reviewed.      Please refer to After Visit Summary or MyChart for upcoming appointment information. If you have any questions regarding your upcoming schedule please reach out to your care team through [facility] or call [phone].    Please notify your assigned Nurse Navigator of any unplanned hospital admissions or Emergency Room visits within 24 hours of discharge.    -------------------------------------------------------------------------------------------------------------------  Please call our office at [phone] if you have any  of the following symptoms:   Fever of 100.5 or greater  Chills  Shortness of breath  Swelling or pain in one leg    After office hours an answering service is available and will contact a provider for emergencies or if you are experiencing any of the above symptoms.        TK ZURITA RN

## 2025-04-04 NOTE — PROGRESS NOTES
[facility] Hematology and Oncology: Office Visit Established Patient     Chief Complaint:    No chief complaint on file.        History of Present Illness:  Ms. Hillman  is a 41 y.o. female who presents  today in follow-up regarding breast cancer.  She noted a a lump in the inner lower quadrant of her left breast in July 2016.  She noticed this after a workout that made her sore.  When this did not resolve, she went for mammogram and ultrasound  which showed irregular and heterogeneously hypoechoic mass with spiculated margins. This overall measured 1.7 x 1.3 x 1.8 cm.  She went for biopsy which showed invasive ductal carcinoma, g2, with ER 2% and OR negative, HER2 negative.  MRI showed the mass  to be 2.3 cm with no other evidence of disease noted.  She saw Dr. Ahumada who recommended oncology consultation to discuss therapy options.  After discussion, she opted for upfront bilateral mastectomy, which was completed on 8/30/16, sentinel node  negative.  Completed ddAC followed by T in March 2017.  No radiation due to T2N0 disease with mastectomy.  Tamoxifen started for (low) ER expression but then stopped due to toxicity, then just monitored on observation.  BRCA negative.  March 2021, she  noted a new nodule in the left breast, MRI imaging showed the mass to be 1.3 cm in size and was suspicious enough that biopsy was recommended.  Unfortunately, this confirms breast cancer, high grade, triple negative.  MRI did not show any distant disease.   We recommended surgical evaluation to discuss resection.  Path reviewed and showed the tumor to be 1.6 cm, 9 lymph nodes were negative ([id number]).   Her updated genetics were negative.  We discussed the role of adjuvant therapy, she cannot receive anthracyclines again, but I would like to incorporate carboplatin given the triple negative histology and the desire for multiagent chemotherapy.  I would  lean toward Taxotere and carboplatin q3w for 6 cycles (TCH minus H).  She

## (undated) DEVICE — NEEDLE HYPO 21GA L1.5IN INTRAMUSCULAR S STL LATCH BVL UP

## (undated) DEVICE — ZIMMER® STERILE DISPOSABLE TOURNIQUET CUFF WITH PLC, DUAL PORT, SINGLE BLADDER, 18 IN. (46 CM)

## (undated) DEVICE — INTENDED FOR TISSUE SEPARATION, AND OTHER PROCEDURES THAT REQUIRE A SHARP SURGICAL BLADE TO PUNCTURE OR CUT.: Brand: BARD-PARKER ® STAINLESS STEEL BLADES

## (undated) DEVICE — AMD ANTIMICROBIAL GAUZE SPONGES,12 PLY USP TYPE VII, 0.2% POLYHEXAMETHYLENE BIGUANIDE HCI (PHMB): Brand: CURITY

## (undated) DEVICE — DRAPE TWL SURG 16X26IN BLU ORB04] ALLCARE INC]

## (undated) DEVICE — PAD,ABDOMINAL,5"X9",ST,LF,25/BX: Brand: MEDLINE INDUSTRIES, INC.

## (undated) DEVICE — GAUZE,SPONGE,8"X4",12PLY,XRAY,STRL,LF: Brand: MEDLINE

## (undated) DEVICE — BLADE ELECTRODE: Brand: VALLEYLAB

## (undated) DEVICE — BUTTON SWITCH PENCIL BLADE ELECTRODE, HOLSTER: Brand: EDGE

## (undated) DEVICE — BANDAGE,GAUZE,BULKEE II,4.5"X4.1YD,STRL: Brand: MEDLINE

## (undated) DEVICE — RESERVOIR,SUCTION,100CC,SILICONE: Brand: MEDLINE

## (undated) DEVICE — Device: Brand: MEDCO MANUFACTURING INC

## (undated) DEVICE — PLATE ES AD W 9FT CRD 2

## (undated) DEVICE — SUTURE MCRYL SZ 4-0 L27IN ABSRB UD L19MM PS-2 1/2 CIR PRIM Y426H

## (undated) DEVICE — SURGICAL BRA: Brand: DEROYAL

## (undated) DEVICE — SUTURE ABSRB X-1 REV CUT 1/2 CIR 22MM UD BRAID 27IN SZ 3-0 J458H

## (undated) DEVICE — (D)PREP SKN CHLRAPRP APPL 26ML -- CONVERT TO ITEM 371833

## (undated) DEVICE — ULTRA HIGH PROFILE, UH 455CC GEL SIZER: Brand: MENTOR MEMORYGEL RESTERILIZABLE GEL SIZER

## (undated) DEVICE — SYR 10ML LUER LOK 1/5ML GRAD --

## (undated) DEVICE — ULTRA HIGH PROFILE, UH 480CC GEL SIZER: Brand: MENTOR MEMORYGEL RESTERILIZABLE GEL SIZER

## (undated) DEVICE — REM POLYHESIVE ADULT PATIENT RETURN ELECTRODE: Brand: VALLEYLAB

## (undated) DEVICE — SPONGE LAP 18X18IN STRL -- 5/PK

## (undated) DEVICE — Device

## (undated) DEVICE — (D)DEVICE COLL TISS DEL SYS -- DISC BY MFR USE ITEM 336430

## (undated) DEVICE — SPLINT CAST W4XL30IN WHT THMB FBRGLS PRECUT INTLOK WRINKLE

## (undated) DEVICE — DRESSING,GAUZE,XEROFORM,CURAD,5"X9",ST: Brand: CURAD

## (undated) DEVICE — SURGICAL PROCEDURE PACK BASIC ST FRANCIS

## (undated) DEVICE — SUTURE MCRYL SZ 5-0 L18IN ABSRB UD L19MM PS-2 3/8 CIR PRIM Y495G

## (undated) DEVICE — OCCLUSIVE GAUZE ROLL,3% BISMUTH TRIBROMOPHENATE IN PETROLATUM BLEND: Brand: XEROFORM

## (undated) DEVICE — SUTURE ETHLN SZ 4-0 L18IN NONABSORBABLE BLK L19MM PS-2 3/8 1667H

## (undated) DEVICE — SYSTEM IMPL DEL FOR BRST IMPL FUN (SEE COMMENT)

## (undated) DEVICE — SINGLE BASIN: Brand: CARDINAL HEALTH

## (undated) DEVICE — SOLUTION LACTATED RINGERS INJECTION USP

## (undated) DEVICE — SYRINGE CATH TIP 50ML

## (undated) DEVICE — SUT ETHLN 3-0 18IN PS1 BLK --

## (undated) DEVICE — AMD ANTIMICROBIAL BANDAGE ROLL,6 PLY: Brand: KERLIX

## (undated) DEVICE — STRETCH BANDAGE ROLL: Brand: DERMACEA

## (undated) DEVICE — TUBING ASPIR L8IN OD3/8IN CLR VYN DISP

## (undated) DEVICE — PAD,ABDOMINAL,5"X9",STERILE,LF,1/PK: Brand: MEDLINE INDUSTRIES, INC.

## (undated) DEVICE — APPLIER CLP L9.38IN M LIG TI DISP STR RNG HNDL LIGACLP

## (undated) DEVICE — SUTURE ABSORBABLE BRAIDED 2-0 CT-1 27 IN UD VICRYL J259H

## (undated) DEVICE — GOWN,REINF,POLY,ECL,PP SLV,XL: Brand: MEDLINE

## (undated) DEVICE — STERILE HOOK LOCK LATEX FREE ELASTIC BANDAGE 4INX5YD: Brand: HOOK LOCK™

## (undated) DEVICE — HEX-LOCKING BLADE ELECTRODE: Brand: EDGE

## (undated) DEVICE — SUTURE VCRL SZ 3-0 L27IN ABSRB UD L26MM SH 1/2 CIR J416H

## (undated) DEVICE — SOLUTION IV 1000ML 0.9% SOD CHL

## (undated) DEVICE — KENDALL SCD EXPRESS SLEEVES, KNEE LENGTH, MEDIUM: Brand: KENDALL SCD

## (undated) DEVICE — BLADE SURG NO15 S STL STR DISP GLASSVAN

## (undated) DEVICE — OCCLUSIVE GAUZE STRIP,3% BISMUTH TRIBROMOPHENATE IN PETROLATUM BLEND: Brand: XEROFORM

## (undated) DEVICE — DRAPE,TOP,102X53,STERILE: Brand: MEDLINE

## (undated) DEVICE — NEEDLE HYPO 18GA L1.5IN PNK S STL HUB POLYPR SHLD REG BVL

## (undated) DEVICE — BINDER ABD M/L H12IN FOR 46-62IN WHT 4 SLD PNL DSGN HOOP

## (undated) DEVICE — MARKER UTIL W/RULER AND LBL -- STRL

## (undated) DEVICE — TRAY PREP DRY W/ PREM GLV 2 APPL 6 SPNG 2 UNDPD 1 OVERWRAP

## (undated) DEVICE — STRIP,CLOSURE,WOUND,MEDI-STRIP,1/2X4: Brand: MEDLINE

## (undated) DEVICE — CURITY NON-ADHERENT STRIPS: Brand: CURITY

## (undated) DEVICE — PADDING CAST W4INXL4YD ST COT COHESIVE HND TEARABLE SPEC

## (undated) DEVICE — ELECTRODE BLDE L6.5IN CAUT EXT DISP

## (undated) DEVICE — SHEET, DRAPE, SPLIT, STERILE: Brand: MEDLINE

## (undated) DEVICE — CANISTER, RIGID, 2000CC: Brand: MEDLINE INDUSTRIES, INC.

## (undated) DEVICE — DRAIN SURG FLAT W7MMXL20CM FULL PERF

## (undated) DEVICE — MASTISOL ADHESIVE LIQ 2/3ML

## (undated) DEVICE — STERILE HOOK LOCK LATEX FREE ELASTIC BANDAGE 6INX5YD: Brand: HOOK LOCK™

## (undated) DEVICE — PUMP PAIN MGMT 400ML 4ML/HR 2 W/ SIL SOAK CATH FOR ABD

## (undated) DEVICE — BLADE SCALP SURG BARD-PARK 10 --

## (undated) DEVICE — SPONGE GZ W4XL4IN COT 12 PLY TYP VII WVN C FLD DSGN

## (undated) DEVICE — GLOVE SURG SZ 65 THK91MIL LTX FREE SYN POLYISOPRENE

## (undated) DEVICE — YANKAUER,BULB TIP,W/O VENT,RIGID,STERILE: Brand: MEDLINE

## (undated) DEVICE — CARDINAL HEALTH FLEXIBLE LIGHT HANDLE COVER: Brand: CARDINAL HEALTH

## (undated) DEVICE — INTENDED FOR TISSUE SEPARATION, AND OTHER PROCEDURES THAT REQUIRE A SHARP SURGICAL BLADE TO PUNCTURE OR CUT.: Brand: BARD-PARKER SAFETY BLADES SIZE 15, STERILE

## (undated) DEVICE — GARMENT,MEDLINE,DVT,INT,CALF,MED, GEN2: Brand: MEDLINE

## (undated) DEVICE — DRAIN SURG 3/4 W10MMXL20CM 100% SIL PERF FLAT HUBLESS

## (undated) DEVICE — INTENDED FOR TISSUE SEPARATION, AND OTHER PROCEDURES THAT REQUIRE A SHARP SURGICAL BLADE TO PUNCTURE OR CUT.: Brand: BARD-PARKER SAFETY BLADES SIZE 10, STERILE

## (undated) DEVICE — 2000CC GUARDIAN II: Brand: GUARDIAN

## (undated) DEVICE — SYR LR LCK 1ML GRAD NSAF 30ML --

## (undated) DEVICE — SUTURE VCRL SZ 4-0 L27IN ABSRB UD L19MM PS-2 3/8 CIR PRIM J426H

## (undated) DEVICE — UNIVERSAL DRAPES: Brand: MEDLINE INDUSTRIES, INC.

## (undated) DEVICE — GEL US 20GM NONIRRITATING OVERWRAPPED FILE PCH TRNSMIT

## (undated) DEVICE — ABDOMINAL PAD: Brand: DERMACEA

## (undated) DEVICE — DRESSING,GAUZE,XEROFORM,CURAD,1"X8",ST: Brand: CURAD

## (undated) DEVICE — GLOVE SURG SZ 65 L12IN FNGR THK79MIL GRN LTX FREE

## (undated) DEVICE — UTILITY MARKER,BLACK WITH LABELS: Brand: DEVON

## (undated) DEVICE — PREP SKN CHLRAPRP APL 26ML STR --

## (undated) DEVICE — BRA SURG POST-OP LG 42IN --

## (undated) DEVICE — SUTURE MCRYL SZ 3-0 L27IN ABSRB UD L19MM PS-2 3/8 CIR PRIM Y427H

## (undated) DEVICE — SUTURE COAT VCRL SZ 4-0 L18IN ABSRB UD L19MM PS-2 1/2 CIR J496G

## (undated) DEVICE — SUT SLK 2-0SH 30IN BLK --

## (undated) DEVICE — TOTAL 1-LAYER TRAY, LATEX FOLEY, 16FR 10: Brand: MEDLINE

## (undated) DEVICE — DRAIN SURG 15FR 100% SIL RND END PERF

## (undated) DEVICE — BNDG ELAS COBAN 2INX5YD NS --

## (undated) DEVICE — SOLUTION IRRIG 1000ML 0.9% SOD CHL USP POUR PLAS BTL

## (undated) DEVICE — FUNNEL 2 KELLER

## (undated) DEVICE — TIP CLEANR ELECSURG 1.75X1.75 --

## (undated) DEVICE — CLEANER,CAUTERY TIP,2X2",STERILE: Brand: MEDLINE

## (undated) DEVICE — SYR 50ML LR LCK 1ML GRAD NSAF --

## (undated) DEVICE — GAUZE,SPONGE,4"X4",16PLY,STRL,LF,10/TRAY: Brand: MEDLINE

## (undated) DEVICE — SUT ETHLN 3-0 18IN PS2 BLK --

## (undated) DEVICE — SKIN STAPLER: Brand: SIGNET

## (undated) DEVICE — GAUZE,SPONGE,2"X2",8PLY,STERILE,LF,2'S: Brand: MEDLINE

## (undated) DEVICE — PREMIUM WET SKIN PREP TRAY: Brand: MEDLINE INDUSTRIES, INC.

## (undated) DEVICE — MINOR SPLIT GENERAL: Brand: MEDLINE INDUSTRIES, INC.